# Patient Record
Sex: FEMALE | Race: WHITE | Employment: OTHER | URBAN - NONMETROPOLITAN AREA
[De-identification: names, ages, dates, MRNs, and addresses within clinical notes are randomized per-mention and may not be internally consistent; named-entity substitution may affect disease eponyms.]

---

## 2017-01-21 ENCOUNTER — APPOINTMENT (OUTPATIENT)
Dept: GENERAL RADIOLOGY | Age: 67
End: 2017-01-21
Payer: MEDICARE

## 2017-01-21 ENCOUNTER — HOSPITAL ENCOUNTER (EMERGENCY)
Age: 67
Discharge: HOME | End: 2017-01-21
Attending: EMERGENCY MEDICINE
Payer: MEDICARE

## 2017-01-21 VITALS
HEIGHT: 67 IN | DIASTOLIC BLOOD PRESSURE: 60 MMHG | TEMPERATURE: 98 F | RESPIRATION RATE: 22 BRPM | WEIGHT: 293 LBS | OXYGEN SATURATION: 100 % | SYSTOLIC BLOOD PRESSURE: 130 MMHG | HEART RATE: 83 BPM | BODY MASS INDEX: 45.99 KG/M2

## 2017-01-21 DIAGNOSIS — M79.672 LEFT FOOT PAIN: ICD-10-CM

## 2017-01-21 DIAGNOSIS — M25.562 ACUTE PAIN OF BOTH KNEES: ICD-10-CM
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
## 2017-01-21 DIAGNOSIS — M25.561 ACUTE PAIN OF BOTH KNEES: ICD-10-CM

## 2017-01-21 LAB
GLUCOSE BLD-MCNC: 81 MG/DL (ref 70–99)
PERFORMED ON: NORMAL

## 2017-01-21 PROCEDURE — 73590 X-RAY EXAM OF LOWER LEG: CPT

## 2017-01-21 PROCEDURE — 82948 REAGENT STRIP/BLOOD GLUCOSE: CPT

## 2017-01-21 PROCEDURE — 99283 EMERGENCY DEPT VISIT LOW MDM: CPT

## 2017-01-21 PROCEDURE — 99283 EMERGENCY DEPT VISIT LOW MDM: CPT | Performed by: EMERGENCY MEDICINE

## 2017-01-21 PROCEDURE — 73560 X-RAY EXAM OF KNEE 1 OR 2: CPT

## 2017-01-21 PROCEDURE — 6370000000 HC RX 637 (ALT 250 FOR IP): Performed by: EMERGENCY MEDICINE

## 2017-01-21 PROCEDURE — 73620 X-RAY EXAM OF FOOT: CPT

## 2017-01-21 RX ORDER — HYDROCODONE BITARTRATE AND ACETAMINOPHEN 5; 325 MG/1; MG/1
1 TABLET ORAL ONCE
Status: COMPLETED | OUTPATIENT
Start: 2017-01-21 | End: 2017-01-21

## 2017-01-21 RX ORDER — CYCLOBENZAPRINE HCL 10 MG
10 TABLET ORAL 3 TIMES DAILY PRN
Qty: 12 TABLET | Refills: 0 | Status: SHIPPED | OUTPATIENT
Start: 2017-01-21 | End: 2017-01-31

## 2017-01-21 RX ORDER — NAPROXEN 500 MG/1
500 TABLET ORAL 2 TIMES DAILY
Qty: 15 TABLET | Refills: 0 | Status: SHIPPED | OUTPATIENT
Start: 2017-01-21 | End: 2017-05-29

## 2017-01-21 RX ADMIN — HYDROCODONE BITARTRATE AND ACETAMINOPHEN 1 TABLET: 5; 325 TABLET ORAL at 08:24

## 2017-01-21 ASSESSMENT — ENCOUNTER SYMPTOMS
SORE THROAT: 0
RHINORRHEA: 0
DIARRHEA: 0
BACK PAIN: 0
VOMITING: 0
ABDOMINAL PAIN: 0
SHORTNESS OF BREATH: 0
NAUSEA: 0

## 2017-01-21 ASSESSMENT — PAIN SCALES - GENERAL
PAINLEVEL_OUTOF10: 6
PAINLEVEL_OUTOF10: 3

## 2017-01-21 ASSESSMENT — PAIN DESCRIPTION - PAIN TYPE: TYPE: ACUTE PAIN

## 2017-01-21 ASSESSMENT — PAIN DESCRIPTION - DESCRIPTORS: DESCRIPTORS: ACHING

## 2017-01-26 RX ORDER — SYRINGE AND NEEDLE,INSULIN,1ML 29 G X1/2"
SYRINGE, EMPTY DISPOSABLE MISCELLANEOUS
Qty: 60 EACH | Refills: 5 | Status: SHIPPED | OUTPATIENT
Start: 2017-01-26

## 2017-02-09 ENCOUNTER — DOCUMENTATION (OUTPATIENT)
Dept: PHYSICAL THERAPY | Facility: HOSPITAL | Age: 67
End: 2017-02-09

## 2017-02-09 DIAGNOSIS — I89.0 ACQUIRED LYMPHEDEMA OF LEG: Primary | ICD-10-CM

## 2017-02-09 NOTE — THERAPY DISCHARGE NOTE
Outpatient Physical Therapy Discharge Summary         Patient Name: Ashley Baker  : 1950  MRN: [id number]    Today's Date: 2017    Visit Dx:    ICD-10-CM ICD-9-CM   1. Acquired lymphedema of leg I89.0 457.1             PT OP Goals       17 1125          PT Short Term Goals    STG 1 Patient demonstrate decreased net edema volume of lower extremity >/= 10% for decrease in edema, symptoms, decreased risk of infection and improved skin care/transition to self-care of condition.   -AL      STG 1 Progress Not Met  -AL      STG 1 Progress Comments Her leg volume would fluctuate depending on activity, if she did self MLD, and if she wore comression  -AL      STG 2 Patient independent and compliant with initial home exercise program focused on diaphragmatic breathing, range of motion, flexibility to decrease edema and improve lymphatic flow for decreased edema and decreased risk of infection.    not using the boot piece for her pump. Not walking much  -AL      STG 2 Progress Not Met  -AL      STG 2 Progress Comments She is not able to do more exercises than ankle pumps  -AL      STG 3 Patient will be able to get in and out of car more easily without dragging leg.  -AL      STG 3 Progress Not Met  -AL      STG 3 Progress Comments She was not able to continue coming for treatment due to not being able to get into her car.  -AL      Long Term Goals    LTG 1 Patient will be able to independently walk and move in order to safely perform daily transfers and activities.   -AL      LTG 1 Progress Not Met  -AL      LTG 1 Progress Comments She was using a rolling chair to get around in her home  -AL      LTG 2 Patient independent and compliant with initial home exercise program focused on diaphragmatic breathing, range of motion, flexibility to decrease edema and improve lymphatic flow for decreased edema and decreased risk of infection.    still does deep breathing but other mobility is declining.  -AL       LTG 2 Progress Not Met  -AL      LTG 2 Progress Comments She was not able to do many of her exercises.  -AL      LTG 3 She will report less difficulty getting out of her car due to decrease in size of right leg.   -AL      LTG 3 Progress Not Met  -AL      LTG 3 Progress Comments She was not able to continue coming for treatment due to not being able to get into her car.  -AL        User Key  (r) = Recorded By, (t) = Taken By, (c) = Cosigned By    Initials Name Provider Type    SHALINI Landry PTA Physical Therapy Assistant          OP PT Discharge Summary  Date of Discharge: 02/09/17  Reason for Discharge: Maximum functional potential achieved, Unable to participate, Lack of progress  Outcomes Achieved: Unable to make functional progress toward goals at this time  Discharge Destination: Home with home program  Discharge Instructions: Patient was not able to walk around the house much and used mostly a rolling chair to get around her home.  She could only do a few of the exercises for her HEP, and had difficulty keeping the size of her leg down in a consistent manner.  She was not able to get into her car anymore, so she was not able to make it for appointments.  We will d/c this patient.       Time Calculation:                    Ani Landry PTA  2/9/2017

## 2017-02-24 RX ORDER — LOSARTAN POTASSIUM 100 MG/1
TABLET ORAL
Qty: 30 TABLET | Refills: 3 | Status: SHIPPED | OUTPATIENT
Start: 2017-02-24

## 2017-03-16 RX ORDER — FUROSEMIDE 40 MG/1
TABLET ORAL
Qty: 90 TABLET | Refills: 0 | Status: ON HOLD | OUTPATIENT
Start: 2017-03-16 | End: 2020-03-27 | Stop reason: SDUPTHER

## 2017-03-21 RX ORDER — ATORVASTATIN CALCIUM 10 MG/1
TABLET, FILM COATED ORAL
Qty: 30 TABLET | Refills: 5 | Status: SHIPPED | OUTPATIENT
Start: 2017-03-21

## 2017-04-04 RX ORDER — DILTIAZEM HYDROCHLORIDE 180 MG/1
CAPSULE, COATED, EXTENDED RELEASE ORAL
Qty: 60 CAPSULE | Refills: 0 | Status: SHIPPED | OUTPATIENT
Start: 2017-04-04 | End: 2017-05-04 | Stop reason: SDUPTHER

## 2017-05-24 RX ORDER — BLOOD SUGAR DIAGNOSTIC
STRIP MISCELLANEOUS
Qty: 50 STRIP | Refills: 3 | Status: SHIPPED | OUTPATIENT
Start: 2017-05-24

## 2017-05-29 ENCOUNTER — HOSPITAL ENCOUNTER (EMERGENCY)
Age: 67
Discharge: HOME | End: 2017-05-30
Payer: MEDICARE
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
## 2017-05-29 DIAGNOSIS — S80.12XA CONTUSION OF LEFT KNEE AND LOWER LEG, INITIAL ENCOUNTER: Primary | ICD-10-CM

## 2017-05-29 DIAGNOSIS — S80.02XA CONTUSION OF LEFT KNEE AND LOWER LEG, INITIAL ENCOUNTER: Primary | ICD-10-CM

## 2017-05-29 PROCEDURE — 99283 EMERGENCY DEPT VISIT LOW MDM: CPT

## 2017-05-29 PROCEDURE — 96372 THER/PROPH/DIAG INJ SC/IM: CPT

## 2017-05-29 ASSESSMENT — PAIN SCALES - GENERAL: PAINLEVEL_OUTOF10: 4

## 2017-05-29 ASSESSMENT — PAIN DESCRIPTION - LOCATION: LOCATION: FOOT;KNEE

## 2017-05-29 ASSESSMENT — PAIN DESCRIPTION - ORIENTATION: ORIENTATION: LEFT

## 2017-05-30 ENCOUNTER — APPOINTMENT (OUTPATIENT)
Dept: GENERAL RADIOLOGY | Age: 67
End: 2017-05-30
Payer: MEDICARE

## 2017-05-30 VITALS
WEIGHT: 293 LBS | TEMPERATURE: 98.7 F | RESPIRATION RATE: 17 BRPM | OXYGEN SATURATION: 94 % | DIASTOLIC BLOOD PRESSURE: 61 MMHG | HEIGHT: 67 IN | BODY MASS INDEX: 45.99 KG/M2 | SYSTOLIC BLOOD PRESSURE: 110 MMHG | HEART RATE: 82 BPM

## 2017-05-30 LAB
GLUCOSE BLD-MCNC: 129 MG/DL
GLUCOSE BLD-MCNC: 129 MG/DL (ref 70–99)
PERFORMED ON: ABNORMAL

## 2017-05-30 PROCEDURE — 73620 X-RAY EXAM OF FOOT: CPT

## 2017-05-30 PROCEDURE — 6360000002 HC RX W HCPCS: Performed by: NURSE PRACTITIONER

## 2017-05-30 PROCEDURE — 96372 THER/PROPH/DIAG INJ SC/IM: CPT

## 2017-05-30 PROCEDURE — 73630 X-RAY EXAM OF FOOT: CPT

## 2017-05-30 PROCEDURE — 73610 X-RAY EXAM OF ANKLE: CPT

## 2017-05-30 PROCEDURE — 99282 EMERGENCY DEPT VISIT SF MDM: CPT | Performed by: NURSE PRACTITIONER

## 2017-05-30 PROCEDURE — 6370000000 HC RX 637 (ALT 250 FOR IP): Performed by: NURSE PRACTITIONER

## 2017-05-30 PROCEDURE — 73600 X-RAY EXAM OF ANKLE: CPT

## 2017-05-30 PROCEDURE — 73560 X-RAY EXAM OF KNEE 1 OR 2: CPT

## 2017-05-30 PROCEDURE — 82948 REAGENT STRIP/BLOOD GLUCOSE: CPT

## 2017-05-30 PROCEDURE — 73590 X-RAY EXAM OF LOWER LEG: CPT

## 2017-05-30 RX ORDER — HYDROCODONE BITARTRATE AND ACETAMINOPHEN 7.5; 325 MG/1; MG/1
1 TABLET ORAL ONCE
Status: COMPLETED | OUTPATIENT
Start: 2017-05-30 | End: 2017-05-30

## 2017-05-30 RX ORDER — HYDROCODONE BITARTRATE AND ACETAMINOPHEN 5; 325 MG/1; MG/1
1 TABLET ORAL EVERY 6 HOURS PRN
Qty: 15 TABLET | Refills: 0 | Status: ON HOLD | OUTPATIENT
Start: 2017-05-30 | End: 2017-06-02

## 2017-05-30 RX ORDER — DEXAMETHASONE SODIUM PHOSPHATE 10 MG/ML
4 INJECTION INTRAMUSCULAR; INTRAVENOUS ONCE
Status: COMPLETED | OUTPATIENT
Start: 2017-05-30 | End: 2017-05-30

## 2017-05-30 RX ORDER — ACETAMINOPHEN 500 MG
500 TABLET ORAL EVERY MORNING
COMMUNITY

## 2017-05-30 RX ORDER — ONDANSETRON 4 MG/1
4 TABLET, ORALLY DISINTEGRATING ORAL ONCE
Status: COMPLETED | OUTPATIENT
Start: 2017-05-30 | End: 2017-05-30

## 2017-05-30 RX ORDER — ONDANSETRON 2 MG/ML
INJECTION INTRAMUSCULAR; INTRAVENOUS
Status: DISCONTINUED
Start: 2017-05-30 | End: 2017-05-30 | Stop reason: WASHOUT

## 2017-05-30 RX ORDER — DOCUSATE SODIUM 100 MG/1
100 CAPSULE, LIQUID FILLED ORAL 2 TIMES DAILY
Qty: 20 CAPSULE | Refills: 0 | Status: SHIPPED | OUTPATIENT
Start: 2017-05-30

## 2017-05-30 RX ADMIN — ONDANSETRON 4 MG: 4 TABLET, ORALLY DISINTEGRATING ORAL at 02:12

## 2017-05-30 RX ADMIN — HYDROCODONE BITARTRATE AND ACETAMINOPHEN 1 TABLET: 7.5; 325 TABLET ORAL at 00:32

## 2017-05-30 RX ADMIN — DEXAMETHASONE SODIUM PHOSPHATE 4 MG: 10 INJECTION INTRAMUSCULAR; INTRAVENOUS at 02:13

## 2017-05-30 ASSESSMENT — ENCOUNTER SYMPTOMS: RESPIRATORY NEGATIVE: 1

## 2017-05-30 ASSESSMENT — PAIN SCALES - GENERAL: PAINLEVEL_OUTOF10: 4

## 2017-05-31 ENCOUNTER — APPOINTMENT (OUTPATIENT)
Dept: GENERAL RADIOLOGY | Age: 67
End: 2017-05-31
Payer: MEDICARE

## 2017-05-31 ENCOUNTER — HOSPITAL ENCOUNTER (OUTPATIENT)
Age: 67
Setting detail: OBSERVATION
Discharge: HOME | End: 2017-06-02
Attending: HOSPITALIST | Admitting: HOSPITALIST
Payer: MEDICARE

## 2017-05-31 DIAGNOSIS — R26.2 AMBULATORY DYSFUNCTION: Primary | ICD-10-CM

## 2017-05-31 PROBLEM — S92.252A CLOSED DISPLACED FRACTURE OF NAVICULAR BONE OF LEFT FOOT: Status: ACTIVE | Noted: 2017-05-31

## 2017-05-31 PROBLEM — E11.618 TYPE 2 DIABETES MELLITUS WITH DIABETIC ARTHROPATHY, WITH LONG-TERM CURRENT USE OF INSULIN (HCC): Status: ACTIVE | Noted: 2017-05-31

## 2017-05-31 PROBLEM — D72.829 LEUKOCYTOSIS: Status: ACTIVE | Noted: 2017-05-31

## 2017-05-31 PROBLEM — Z79.4 TYPE 2 DIABETES MELLITUS WITH DIABETIC ARTHROPATHY, WITH LONG-TERM CURRENT USE OF INSULIN (HCC): Status: ACTIVE | Noted: 2017-05-31

## 2017-05-31 PROBLEM — R62.7 FAILURE TO THRIVE IN ADULT: Status: ACTIVE | Noted: 2017-05-31

## 2017-05-31 PROBLEM — N30.01 ACUTE CYSTITIS WITH HEMATURIA: Status: ACTIVE | Noted: 2017-05-31

## 2017-05-31 LAB
ALBUMIN SERPL-MCNC: 3.7 G/DL (ref 3.5–5.2)
ALP BLD-CCNC: 78 U/L (ref 35–104)
ALT SERPL-CCNC: 16 U/L (ref 5–33)
ANION GAP SERPL CALCULATED.3IONS-SCNC: 12 MMOL/L (ref 7–19)
AST SERPL-CCNC: 20 U/L (ref 5–32)
BACTERIA: NEGATIVE /HPF
BILIRUB SERPL-MCNC: 0.4 MG/DL (ref 0.2–1.2)
BILIRUBIN URINE: NEGATIVE
BLOOD, URINE: ABNORMAL
BUN BLDV-MCNC: 33 MG/DL (ref 8–23)
CALCIUM SERPL-MCNC: 9.6 MG/DL (ref 8.8–10.2)
CHLORIDE BLD-SCNC: 101 MMOL/L (ref 98–111)
CLARITY: ABNORMAL
CO2: 28 MMOL/L (ref 22–29)
COLOR: YELLOW
CREAT SERPL-MCNC: 1.1 MG/DL (ref 0.5–0.9)
EPITHELIAL CELLS, UA: 1 /HPF (ref 0–5)
GFR NON-AFRICAN AMERICAN: 49
GLUCOSE BLD-MCNC: 160 MG/DL (ref 74–109)
GLUCOSE BLD-MCNC: 213 MG/DL (ref 70–99)
GLUCOSE URINE: NEGATIVE MG/DL
HBA1C MFR BLD: 5.8 %
HCT VFR BLD CALC: 36.2 % (ref 37–47)
HEMOGLOBIN: 11.1 G/DL (ref 12–16)
HYALINE CASTS: 4 /HPF (ref 0–8)
KETONES, URINE: NEGATIVE MG/DL
LEUKOCYTE ESTERASE, URINE: ABNORMAL
MCH RBC QN AUTO: 25.8 PG (ref 27–31)
MCHC RBC AUTO-ENTMCNC: 30.7 G/DL (ref 33–37)
MCV RBC AUTO: 84.2 FL (ref 81–99)
NITRITE, URINE: POSITIVE
PDW BLD-RTO: 17.9 % (ref 11.5–14.5)
PERFORMED ON: ABNORMAL
PH UA: 5.5
PLATELET # BLD: 240 K/UL (ref 130–400)
PMV BLD AUTO: 11.2 FL (ref 7.4–10.4)
POTASSIUM SERPL-SCNC: 4.3 MMOL/L (ref 3.5–5)
PRO-BNP: 5779 PG/ML (ref 0–900)
PROTEIN UA: 100 MG/DL
RBC # BLD: 4.3 M/UL (ref 4.2–5.4)
RBC UA: 5 /HPF (ref 0–4)
SODIUM BLD-SCNC: 141 MMOL/L (ref 136–145)
SPECIFIC GRAVITY UA: 1.02
T4 TOTAL: 7.4 UG/ML (ref 4.5–11.7)
TOTAL PROTEIN: 7.1 G/DL (ref 6.6–8.7)
TSH SERPL DL<=0.05 MIU/L-ACNC: 2.3 UIU/ML (ref 0.27–4.2)
UROBILINOGEN, URINE: 0.2 E.U./DL
WBC # BLD: 19.5 K/UL (ref 4.8–10.8)
WBC UA: 280 /HPF (ref 0–5)

## 2017-05-31 PROCEDURE — 6370000000 HC RX 637 (ALT 250 FOR IP): Performed by: FAMILY MEDICINE

## 2017-05-31 PROCEDURE — 87086 URINE CULTURE/COLONY COUNT: CPT

## 2017-05-31 PROCEDURE — 84443 ASSAY THYROID STIM HORMONE: CPT

## 2017-05-31 PROCEDURE — 71010 XR CHEST PORTABLE: CPT

## 2017-05-31 PROCEDURE — 87185 SC STD ENZYME DETCJ PER NZM: CPT

## 2017-05-31 PROCEDURE — 82948 REAGENT STRIP/BLOOD GLUCOSE: CPT

## 2017-05-31 PROCEDURE — 36415 COLL VENOUS BLD VENIPUNCTURE: CPT

## 2017-05-31 PROCEDURE — 87040 BLOOD CULTURE FOR BACTERIA: CPT

## 2017-05-31 PROCEDURE — 85027 COMPLETE CBC AUTOMATED: CPT

## 2017-05-31 PROCEDURE — 83880 ASSAY OF NATRIURETIC PEPTIDE: CPT

## 2017-05-31 PROCEDURE — 99284 EMERGENCY DEPT VISIT MOD MDM: CPT | Performed by: EMERGENCY MEDICINE

## 2017-05-31 PROCEDURE — 99285 EMERGENCY DEPT VISIT HI MDM: CPT

## 2017-05-31 PROCEDURE — 84436 ASSAY OF TOTAL THYROXINE: CPT

## 2017-05-31 PROCEDURE — 2580000003 HC RX 258: Performed by: FAMILY MEDICINE

## 2017-05-31 PROCEDURE — G0378 HOSPITAL OBSERVATION PER HR: HCPCS

## 2017-05-31 PROCEDURE — 81001 URINALYSIS AUTO W/SCOPE: CPT

## 2017-05-31 PROCEDURE — 80053 COMPREHEN METABOLIC PANEL: CPT

## 2017-05-31 PROCEDURE — 83036 HEMOGLOBIN GLYCOSYLATED A1C: CPT

## 2017-05-31 PROCEDURE — 99220 PR INITIAL OBSERVATION CARE/DAY 70 MINUTES: CPT | Performed by: FAMILY MEDICINE

## 2017-05-31 PROCEDURE — 2700000000 HC OXYGEN THERAPY PER DAY

## 2017-05-31 RX ORDER — ACETAMINOPHEN 325 MG/1
650 TABLET ORAL EVERY 4 HOURS PRN
Status: DISCONTINUED | OUTPATIENT
Start: 2017-05-31 | End: 2017-06-02 | Stop reason: HOSPADM

## 2017-05-31 RX ORDER — DEXTROSE MONOHYDRATE 50 MG/ML
100 INJECTION, SOLUTION INTRAVENOUS PRN
Status: DISCONTINUED | OUTPATIENT
Start: 2017-05-31 | End: 2017-06-02 | Stop reason: HOSPADM

## 2017-05-31 RX ORDER — SODIUM CHLORIDE 9 MG/ML
INJECTION, SOLUTION INTRAVENOUS CONTINUOUS
Status: DISCONTINUED | OUTPATIENT
Start: 2017-05-31 | End: 2017-06-02 | Stop reason: HOSPADM

## 2017-05-31 RX ORDER — DILTIAZEM HYDROCHLORIDE 180 MG/1
180 CAPSULE, COATED, EXTENDED RELEASE ORAL DAILY
Status: DISCONTINUED | OUTPATIENT
Start: 2017-06-01 | End: 2017-06-02 | Stop reason: HOSPADM

## 2017-05-31 RX ORDER — SODIUM CHLORIDE 0.9 % (FLUSH) 0.9 %
10 SYRINGE (ML) INJECTION EVERY 12 HOURS SCHEDULED
Status: DISCONTINUED | OUTPATIENT
Start: 2017-05-31 | End: 2017-06-02 | Stop reason: HOSPADM

## 2017-05-31 RX ORDER — HYDROCODONE BITARTRATE AND ACETAMINOPHEN 5; 325 MG/1; MG/1
2 TABLET ORAL EVERY 4 HOURS PRN
Status: DISCONTINUED | OUTPATIENT
Start: 2017-05-31 | End: 2017-06-02 | Stop reason: HOSPADM

## 2017-05-31 RX ORDER — SODIUM CHLORIDE 0.9 % (FLUSH) 0.9 %
10 SYRINGE (ML) INJECTION PRN
Status: DISCONTINUED | OUTPATIENT
Start: 2017-05-31 | End: 2017-06-02 | Stop reason: HOSPADM

## 2017-05-31 RX ORDER — ALLOPURINOL 300 MG/1
300 TABLET ORAL DAILY
Status: DISCONTINUED | OUTPATIENT
Start: 2017-06-01 | End: 2017-06-02 | Stop reason: HOSPADM

## 2017-05-31 RX ORDER — ONDANSETRON 2 MG/ML
4 INJECTION INTRAMUSCULAR; INTRAVENOUS EVERY 6 HOURS PRN
Status: DISCONTINUED | OUTPATIENT
Start: 2017-05-31 | End: 2017-06-02 | Stop reason: HOSPADM

## 2017-05-31 RX ORDER — LOSARTAN POTASSIUM 50 MG/1
50 TABLET ORAL DAILY
Status: DISCONTINUED | OUTPATIENT
Start: 2017-06-01 | End: 2017-06-02 | Stop reason: HOSPADM

## 2017-05-31 RX ORDER — HYDROCODONE BITARTRATE AND ACETAMINOPHEN 5; 325 MG/1; MG/1
1 TABLET ORAL EVERY 4 HOURS PRN
Status: DISCONTINUED | OUTPATIENT
Start: 2017-05-31 | End: 2017-06-02 | Stop reason: HOSPADM

## 2017-05-31 RX ORDER — INSULIN GLARGINE 100 [IU]/ML
20 INJECTION, SOLUTION SUBCUTANEOUS 2 TIMES DAILY
Status: DISCONTINUED | OUTPATIENT
Start: 2017-05-31 | End: 2017-06-02 | Stop reason: HOSPADM

## 2017-05-31 RX ORDER — DEXTROSE MONOHYDRATE 25 G/50ML
12.5 INJECTION, SOLUTION INTRAVENOUS PRN
Status: DISCONTINUED | OUTPATIENT
Start: 2017-05-31 | End: 2017-06-02 | Stop reason: HOSPADM

## 2017-05-31 RX ORDER — DOCUSATE SODIUM 100 MG/1
100 CAPSULE, LIQUID FILLED ORAL 2 TIMES DAILY
Status: DISCONTINUED | OUTPATIENT
Start: 2017-05-31 | End: 2017-06-02

## 2017-05-31 RX ORDER — ATORVASTATIN CALCIUM 20 MG/1
20 TABLET, FILM COATED ORAL NIGHTLY
Status: DISCONTINUED | OUTPATIENT
Start: 2017-05-31 | End: 2017-06-02 | Stop reason: HOSPADM

## 2017-05-31 RX ORDER — LEVOTHYROXINE SODIUM 0.1 MG/1
200 TABLET ORAL DAILY
Status: DISCONTINUED | OUTPATIENT
Start: 2017-06-01 | End: 2017-06-02 | Stop reason: HOSPADM

## 2017-05-31 RX ORDER — PAROXETINE HYDROCHLORIDE 20 MG/1
20 TABLET, FILM COATED ORAL DAILY
Status: DISCONTINUED | OUTPATIENT
Start: 2017-06-01 | End: 2017-06-02 | Stop reason: HOSPADM

## 2017-05-31 RX ORDER — NICOTINE POLACRILEX 4 MG
15 LOZENGE BUCCAL PRN
Status: DISCONTINUED | OUTPATIENT
Start: 2017-05-31 | End: 2017-06-02 | Stop reason: HOSPADM

## 2017-05-31 RX ADMIN — Medication 10 ML: at 23:56

## 2017-05-31 RX ADMIN — SODIUM CHLORIDE: 9 INJECTION, SOLUTION INTRAVENOUS at 23:46

## 2017-05-31 RX ADMIN — INSULIN LISPRO 2 UNITS: 100 INJECTION, SOLUTION INTRAVENOUS; SUBCUTANEOUS at 23:40

## 2017-05-31 RX ADMIN — INSULIN GLARGINE 20 UNITS: 100 INJECTION, SOLUTION SUBCUTANEOUS at 23:40

## 2017-05-31 RX ADMIN — DOCUSATE SODIUM 100 MG: 100 CAPSULE, LIQUID FILLED ORAL at 23:39

## 2017-05-31 RX ADMIN — ATORVASTATIN CALCIUM 20 MG: 20 TABLET, FILM COATED ORAL at 23:39

## 2017-05-31 ASSESSMENT — ENCOUNTER SYMPTOMS
EYE ITCHING: 0
COUGH: 0
GASTROINTESTINAL NEGATIVE: 1
ABDOMINAL PAIN: 0
APNEA: 0
CHOKING: 0
EYE PAIN: 0
BACK PAIN: 0
EYES NEGATIVE: 1
ABDOMINAL DISTENTION: 0
EYE DISCHARGE: 0
RESPIRATORY NEGATIVE: 1

## 2017-05-31 ASSESSMENT — PAIN DESCRIPTION - LOCATION: LOCATION: LEG

## 2017-05-31 ASSESSMENT — PAIN DESCRIPTION - ORIENTATION: ORIENTATION: LEFT;RIGHT

## 2017-05-31 ASSESSMENT — PAIN SCALES - GENERAL
PAINLEVEL_OUTOF10: 6
PAINLEVEL_OUTOF10: 6

## 2017-05-31 ASSESSMENT — PAIN DESCRIPTION - PAIN TYPE: TYPE: ACUTE PAIN

## 2017-06-01 PROBLEM — D64.9 ANEMIA OF UNKNOWN ETIOLOGY: Status: ACTIVE | Noted: 2017-06-01

## 2017-06-01 PROBLEM — I50.9 CHF (CONGESTIVE HEART FAILURE) (HCC): Status: ACTIVE | Noted: 2017-06-01

## 2017-06-01 PROBLEM — N18.30 CKD (CHRONIC KIDNEY DISEASE) STAGE 3, GFR 30-59 ML/MIN (HCC): Status: ACTIVE | Noted: 2017-06-01

## 2017-06-01 LAB
ANION GAP SERPL CALCULATED.3IONS-SCNC: 11 MMOL/L (ref 7–19)
BASOPHILS ABSOLUTE: 0 K/UL (ref 0–0.2)
BASOPHILS RELATIVE PERCENT: 0.3 % (ref 0–1)
BUN BLDV-MCNC: 36 MG/DL (ref 8–23)
CALCIUM SERPL-MCNC: 9 MG/DL (ref 8.8–10.2)
CHLORIDE BLD-SCNC: 101 MMOL/L (ref 98–111)
CO2: 28 MMOL/L (ref 22–29)
CREAT SERPL-MCNC: 1.1 MG/DL (ref 0.5–0.9)
EKG P AXIS: 63 DEGREES
EKG P-R INTERVAL: 186 MS
EKG Q-T INTERVAL: 372 MS
EKG QRS DURATION: 86 MS
EKG QTC CALCULATION (BAZETT): 401 MS
EKG T AXIS: 41 DEGREES
EOSINOPHILS ABSOLUTE: 0.8 K/UL (ref 0–0.6)
EOSINOPHILS RELATIVE PERCENT: 6 % (ref 0–5)
GFR NON-AFRICAN AMERICAN: 49
GLUCOSE BLD-MCNC: 102 MG/DL (ref 70–99)
GLUCOSE BLD-MCNC: 135 MG/DL (ref 70–99)
GLUCOSE BLD-MCNC: 157 MG/DL (ref 74–109)
GLUCOSE BLD-MCNC: 169 MG/DL (ref 70–99)
GLUCOSE BLD-MCNC: 185 MG/DL (ref 70–99)
HCT VFR BLD CALC: 32 % (ref 37–47)
HEMOGLOBIN: 9.7 G/DL (ref 12–16)
LV EF: 58 %
LVEF MODALITY: NORMAL
LYMPHOCYTES ABSOLUTE: 1.5 K/UL (ref 1.1–4.5)
LYMPHOCYTES RELATIVE PERCENT: 11.7 % (ref 20–40)
MCH RBC QN AUTO: 25.7 PG (ref 27–31)
MCHC RBC AUTO-ENTMCNC: 30.3 G/DL (ref 33–37)
MCV RBC AUTO: 84.9 FL (ref 81–99)
MONOCYTES ABSOLUTE: 1.5 K/UL (ref 0–0.9)
MONOCYTES RELATIVE PERCENT: 11.3 % (ref 0–10)
NEUTROPHILS ABSOLUTE: 9.2 K/UL (ref 1.5–7.5)
NEUTROPHILS RELATIVE PERCENT: 70 % (ref 50–65)
PDW BLD-RTO: 17.7 % (ref 11.5–14.5)
PERFORMED ON: ABNORMAL
PLATELET # BLD: 216 K/UL (ref 130–400)
PMV BLD AUTO: 11.5 FL (ref 7.4–10.4)
POTASSIUM SERPL-SCNC: 4.3 MMOL/L (ref 3.5–5)
RBC # BLD: 3.77 M/UL (ref 4.2–5.4)
SODIUM BLD-SCNC: 140 MMOL/L (ref 136–145)
WBC # BLD: 13.1 K/UL (ref 4.8–10.8)

## 2017-06-01 PROCEDURE — 93306 TTE W/DOPPLER COMPLETE: CPT

## 2017-06-01 PROCEDURE — 80048 BASIC METABOLIC PNL TOTAL CA: CPT

## 2017-06-01 PROCEDURE — 6360000002 HC RX W HCPCS: Performed by: FAMILY MEDICINE

## 2017-06-01 PROCEDURE — G8978 MOBILITY CURRENT STATUS: HCPCS

## 2017-06-01 PROCEDURE — G8979 MOBILITY GOAL STATUS: HCPCS

## 2017-06-01 PROCEDURE — 2580000003 HC RX 258: Performed by: NURSE PRACTITIONER

## 2017-06-01 PROCEDURE — 2700000000 HC OXYGEN THERAPY PER DAY

## 2017-06-01 PROCEDURE — 6370000000 HC RX 637 (ALT 250 FOR IP): Performed by: NURSE PRACTITIONER

## 2017-06-01 PROCEDURE — 93005 ELECTROCARDIOGRAM TRACING: CPT

## 2017-06-01 PROCEDURE — G0378 HOSPITAL OBSERVATION PER HR: HCPCS

## 2017-06-01 PROCEDURE — 99225 PR SBSQ OBSERVATION CARE/DAY 25 MINUTES: CPT | Performed by: INTERNAL MEDICINE

## 2017-06-01 PROCEDURE — 6370000000 HC RX 637 (ALT 250 FOR IP): Performed by: FAMILY MEDICINE

## 2017-06-01 PROCEDURE — 6360000002 HC RX W HCPCS: Performed by: NURSE PRACTITIONER

## 2017-06-01 PROCEDURE — 36415 COLL VENOUS BLD VENIPUNCTURE: CPT

## 2017-06-01 PROCEDURE — 2580000003 HC RX 258: Performed by: FAMILY MEDICINE

## 2017-06-01 PROCEDURE — 97161 PT EVAL LOW COMPLEX 20 MIN: CPT

## 2017-06-01 PROCEDURE — 82948 REAGENT STRIP/BLOOD GLUCOSE: CPT

## 2017-06-01 PROCEDURE — 85025 COMPLETE CBC W/AUTO DIFF WBC: CPT

## 2017-06-01 RX ORDER — SKIN CLEANSER COMB NO.42
CLEANSER (ML) TOPICAL DAILY
Status: DISCONTINUED | OUTPATIENT
Start: 2017-06-01 | End: 2017-06-02 | Stop reason: HOSPADM

## 2017-06-01 RX ORDER — LANOLIN ALCOHOL/MO/W.PET/CERES
CREAM (GRAM) TOPICAL 2 TIMES DAILY
Status: DISCONTINUED | OUTPATIENT
Start: 2017-06-01 | End: 2017-06-02 | Stop reason: HOSPADM

## 2017-06-01 RX ADMIN — INSULIN GLARGINE 20 UNITS: 100 INJECTION, SOLUTION SUBCUTANEOUS at 22:07

## 2017-06-01 RX ADMIN — INSULIN LISPRO 2 UNITS: 100 INJECTION, SOLUTION INTRAVENOUS; SUBCUTANEOUS at 17:53

## 2017-06-01 RX ADMIN — HYDROCODONE BITARTRATE AND ACETAMINOPHEN 2 TABLET: 5; 325 TABLET ORAL at 04:08

## 2017-06-01 RX ADMIN — ANORECTAL OINTMENT: 15.7; .44; 24; 20.6 OINTMENT TOPICAL at 14:55

## 2017-06-01 RX ADMIN — DILTIAZEM HYDROCHLORIDE 180 MG: 180 CAPSULE, COATED, EXTENDED RELEASE ORAL at 08:11

## 2017-06-01 RX ADMIN — CEFTRIAXONE SODIUM 1 G: 1 INJECTION, POWDER, FOR SOLUTION INTRAMUSCULAR; INTRAVENOUS at 11:28

## 2017-06-01 RX ADMIN — DOCUSATE SODIUM 100 MG: 100 CAPSULE, LIQUID FILLED ORAL at 22:05

## 2017-06-01 RX ADMIN — SODIUM CHLORIDE: 9 INJECTION, SOLUTION INTRAVENOUS at 15:02

## 2017-06-01 RX ADMIN — ATORVASTATIN CALCIUM 20 MG: 20 TABLET, FILM COATED ORAL at 22:06

## 2017-06-01 RX ADMIN — INSULIN GLARGINE 20 UNITS: 100 INJECTION, SOLUTION SUBCUTANEOUS at 08:15

## 2017-06-01 RX ADMIN — Medication: at 14:55

## 2017-06-01 RX ADMIN — Medication: at 14:54

## 2017-06-01 RX ADMIN — ALLOPURINOL 300 MG: 300 TABLET ORAL at 08:15

## 2017-06-01 RX ADMIN — DOCUSATE SODIUM 100 MG: 100 CAPSULE, LIQUID FILLED ORAL at 08:11

## 2017-06-01 RX ADMIN — LEVOTHYROXINE SODIUM 200 MCG: 100 TABLET ORAL at 06:19

## 2017-06-01 RX ADMIN — ENOXAPARIN SODIUM 40 MG: 40 INJECTION SUBCUTANEOUS at 08:11

## 2017-06-01 ASSESSMENT — ENCOUNTER SYMPTOMS
SHORTNESS OF BREATH: 0
WHEEZING: 0
BLURRED VISION: 0
ABDOMINAL PAIN: 0
SPUTUM PRODUCTION: 0
NAUSEA: 0
COUGH: 0
DIARRHEA: 0
VOMITING: 0
CONSTIPATION: 1
SORE THROAT: 0

## 2017-06-01 ASSESSMENT — PAIN SCALES - GENERAL: PAINLEVEL_OUTOF10: 0

## 2017-06-02 VITALS
HEART RATE: 97 BPM | TEMPERATURE: 98.5 F | OXYGEN SATURATION: 91 % | HEIGHT: 67 IN | RESPIRATION RATE: 22 BRPM | DIASTOLIC BLOOD PRESSURE: 63 MMHG | SYSTOLIC BLOOD PRESSURE: 118 MMHG

## 2017-06-02 LAB
FERRITIN: 130.6 NG/ML (ref 13–150)
FOLATE: 9.7 NG/ML (ref 4.8–37.3)
GLUCOSE BLD-MCNC: 166 MG/DL (ref 70–99)
GLUCOSE BLD-MCNC: 172 MG/DL (ref 70–99)
GLUCOSE BLD-MCNC: 175 MG/DL (ref 70–99)
HCT VFR BLD CALC: 32.1 % (ref 37–47)
IRON SATURATION: 5 % (ref 14–50)
IRON: 13 UG/DL (ref 37–145)
ORGANISM: ABNORMAL
PERFORMED ON: ABNORMAL
RETICULOCYTE ABSOLUTE COUNT: 0.05 M/UL (ref 0.03–0.12)
RETICULOCYTE COUNT PCT: 1.29 % (ref 0.5–1.5)
TOTAL IRON BINDING CAPACITY: 242 UG/DL (ref 250–400)
URINE CULTURE, ROUTINE: ABNORMAL
URINE CULTURE, ROUTINE: ABNORMAL
VITAMIN B-12: 210 PG/ML (ref 211–946)

## 2017-06-02 PROCEDURE — 6370000000 HC RX 637 (ALT 250 FOR IP): Performed by: NURSE PRACTITIONER

## 2017-06-02 PROCEDURE — 6360000002 HC RX W HCPCS: Performed by: NURSE PRACTITIONER

## 2017-06-02 PROCEDURE — 82948 REAGENT STRIP/BLOOD GLUCOSE: CPT

## 2017-06-02 PROCEDURE — 83540 ASSAY OF IRON: CPT

## 2017-06-02 PROCEDURE — 85045 AUTOMATED RETICULOCYTE COUNT: CPT

## 2017-06-02 PROCEDURE — 6360000002 HC RX W HCPCS: Performed by: FAMILY MEDICINE

## 2017-06-02 PROCEDURE — 82728 ASSAY OF FERRITIN: CPT

## 2017-06-02 PROCEDURE — 36415 COLL VENOUS BLD VENIPUNCTURE: CPT

## 2017-06-02 PROCEDURE — 2700000000 HC OXYGEN THERAPY PER DAY

## 2017-06-02 PROCEDURE — G0378 HOSPITAL OBSERVATION PER HR: HCPCS

## 2017-06-02 PROCEDURE — 2580000003 HC RX 258: Performed by: NURSE PRACTITIONER

## 2017-06-02 PROCEDURE — 82746 ASSAY OF FOLIC ACID SERUM: CPT

## 2017-06-02 PROCEDURE — 99217 PR OBSERVATION CARE DISCHARGE MANAGEMENT: CPT | Performed by: NURSE PRACTITIONER

## 2017-06-02 PROCEDURE — 82607 VITAMIN B-12: CPT

## 2017-06-02 PROCEDURE — 6370000000 HC RX 637 (ALT 250 FOR IP): Performed by: FAMILY MEDICINE

## 2017-06-02 PROCEDURE — 83550 IRON BINDING TEST: CPT

## 2017-06-02 RX ORDER — CYANOCOBALAMIN 1000 UG/ML
1000 INJECTION INTRAMUSCULAR; SUBCUTANEOUS
Status: DISCONTINUED | OUTPATIENT
Start: 2017-06-02 | End: 2017-06-02 | Stop reason: HOSPADM

## 2017-06-02 RX ORDER — CYANOCOBALAMIN 1000 UG/ML
1000 INJECTION INTRAMUSCULAR; SUBCUTANEOUS
Status: ON HOLD | DISCHARGE
Start: 2017-06-02 | End: 2019-06-25

## 2017-06-02 RX ORDER — SKIN CLEANSER COMB NO.42
CLEANSER (ML) TOPICAL
Status: ON HOLD | DISCHARGE
Start: 2017-06-02 | End: 2019-06-25

## 2017-06-02 RX ORDER — BISACODYL 10 MG
10 SUPPOSITORY, RECTAL RECTAL DAILY
Status: DISCONTINUED | OUTPATIENT
Start: 2017-06-02 | End: 2017-06-02 | Stop reason: HOSPADM

## 2017-06-02 RX ORDER — LANOLIN ALCOHOL/MO/W.PET/CERES
CREAM (GRAM) TOPICAL 2 TIMES DAILY
Refills: 0 | DISCHARGE
Start: 2017-06-02

## 2017-06-02 RX ORDER — POLYETHYLENE GLYCOL 3350 17 G/17G
17 POWDER, FOR SOLUTION ORAL 2 TIMES DAILY
Qty: 527 G | Refills: 1 | DISCHARGE
Start: 2017-06-02 | End: 2017-07-02

## 2017-06-02 RX ORDER — SODIUM PHOSPHATE, DIBASIC AND SODIUM PHOSPHATE, MONOBASIC 7; 19 G/133ML; G/133ML
1 ENEMA RECTAL
Status: DISCONTINUED | OUTPATIENT
Start: 2017-06-02 | End: 2017-06-02 | Stop reason: HOSPADM

## 2017-06-02 RX ORDER — HYDROCODONE BITARTRATE AND ACETAMINOPHEN 5; 325 MG/1; MG/1
1 TABLET ORAL EVERY 6 HOURS PRN
Qty: 15 TABLET | Refills: 0 | Status: ON HOLD | OUTPATIENT
Start: 2017-06-02 | End: 2019-03-13 | Stop reason: HOSPADM

## 2017-06-02 RX ORDER — FERROUS SULFATE 325(65) MG
325 TABLET ORAL
Qty: 30 TABLET | Refills: 3 | DISCHARGE
Start: 2017-06-02

## 2017-06-02 RX ORDER — POLYETHYLENE GLYCOL 3350 17 G/17G
17 POWDER, FOR SOLUTION ORAL 2 TIMES DAILY
Status: DISCONTINUED | OUTPATIENT
Start: 2017-06-02 | End: 2017-06-02 | Stop reason: HOSPADM

## 2017-06-02 RX ORDER — CEFDINIR 300 MG/1
300 CAPSULE ORAL 2 TIMES DAILY
Qty: 20 CAPSULE | Refills: 0 | DISCHARGE
Start: 2017-06-02 | End: 2017-06-12

## 2017-06-02 RX ORDER — DOCUSATE SODIUM 100 MG/1
200 CAPSULE, LIQUID FILLED ORAL 2 TIMES DAILY
Status: DISCONTINUED | OUTPATIENT
Start: 2017-06-02 | End: 2017-06-02 | Stop reason: HOSPADM

## 2017-06-02 RX ADMIN — MAGNESIUM HYDROXIDE 30 ML: 400 SUSPENSION ORAL at 06:27

## 2017-06-02 RX ADMIN — LEVOTHYROXINE SODIUM 200 MCG: 100 TABLET ORAL at 05:51

## 2017-06-02 RX ADMIN — LOSARTAN POTASSIUM 50 MG: 50 TABLET ORAL at 08:28

## 2017-06-02 RX ADMIN — ENOXAPARIN SODIUM 40 MG: 40 INJECTION SUBCUTANEOUS at 08:28

## 2017-06-02 RX ADMIN — Medication: at 08:34

## 2017-06-02 RX ADMIN — DILTIAZEM HYDROCHLORIDE 180 MG: 180 CAPSULE, COATED, EXTENDED RELEASE ORAL at 08:28

## 2017-06-02 RX ADMIN — PAROXETINE 20 MG: 20 TABLET, FILM COATED ORAL at 08:28

## 2017-06-02 RX ADMIN — ALLOPURINOL 300 MG: 300 TABLET ORAL at 08:28

## 2017-06-02 RX ADMIN — INSULIN GLARGINE 20 UNITS: 100 INJECTION, SOLUTION SUBCUTANEOUS at 08:29

## 2017-06-02 RX ADMIN — DOCUSATE SODIUM 100 MG: 100 CAPSULE, LIQUID FILLED ORAL at 08:28

## 2017-06-02 RX ADMIN — INSULIN LISPRO 2 UNITS: 100 INJECTION, SOLUTION INTRAVENOUS; SUBCUTANEOUS at 08:30

## 2017-06-02 RX ADMIN — ANORECTAL OINTMENT: 15.7; .44; 24; 20.6 OINTMENT TOPICAL at 08:34

## 2017-06-02 RX ADMIN — POLYETHYLENE GLYCOL 3350 17 G: 17 POWDER, FOR SOLUTION ORAL at 10:31

## 2017-06-02 RX ADMIN — BISACODYL 10 MG: 10 SUPPOSITORY RECTAL at 10:31

## 2017-06-02 RX ADMIN — IRON SUCROSE 200 MG: 20 INJECTION, SOLUTION INTRAVENOUS at 13:19

## 2017-06-02 RX ADMIN — CYANOCOBALAMIN 1000 MCG: 1000 INJECTION, SOLUTION INTRAMUSCULAR at 13:19

## 2017-06-06 ENCOUNTER — TELEPHONE (OUTPATIENT)
Dept: PRIMARY CARE CLINIC | Age: 67
End: 2017-06-06

## 2017-06-06 LAB
BLOOD CULTURE, ROUTINE: NORMAL
CULTURE, BLOOD 2: NORMAL

## 2017-06-26 ASSESSMENT — ENCOUNTER SYMPTOMS
ABDOMINAL DISTENTION: 0
BACK PAIN: 0
COUGH: 0
RESPIRATORY NEGATIVE: 1
EYE PAIN: 0
ABDOMINAL PAIN: 0
APNEA: 0
EYE ITCHING: 0
CHOKING: 0
EYES NEGATIVE: 1
GASTROINTESTINAL NEGATIVE: 1
EYE DISCHARGE: 0

## 2018-03-01 ENCOUNTER — HOSPITAL ENCOUNTER (EMERGENCY)
Age: 68
Discharge: HOME | End: 2018-03-01
Attending: EMERGENCY MEDICINE
Payer: MEDICARE

## 2018-03-01 VITALS
DIASTOLIC BLOOD PRESSURE: 80 MMHG | HEART RATE: 78 BPM | OXYGEN SATURATION: 94 % | SYSTOLIC BLOOD PRESSURE: 146 MMHG | TEMPERATURE: 98 F | RESPIRATION RATE: 22 BRPM

## 2018-03-01 DIAGNOSIS — R11.2 NAUSEA AND VOMITING, INTRACTABILITY OF VOMITING NOT SPECIFIED, UNSPECIFIED VOMITING TYPE: Primary | ICD-10-CM

## 2018-03-01 LAB
ALBUMIN SERPL-MCNC: 3.2 G/DL (ref 3.5–5.2)
ALP BLD-CCNC: 73 U/L (ref 35–104)
ALT SERPL-CCNC: 9 U/L (ref 5–33)
ANION GAP SERPL CALCULATED.3IONS-SCNC: 12 MMOL/L (ref 7–19)
AST SERPL-CCNC: 13 U/L (ref 5–32)
BASOPHILS ABSOLUTE: 0 K/UL (ref 0–0.2)
BASOPHILS RELATIVE PERCENT: 0.2 % (ref 0–1)
BILIRUB SERPL-MCNC: 0.4 MG/DL (ref 0.2–1.2)
BUN BLDV-MCNC: 17 MG/DL (ref 8–23)
CALCIUM SERPL-MCNC: 9.9 MG/DL (ref 8.8–10.2)
CHLORIDE BLD-SCNC: 97 MMOL/L (ref 98–111)
CO2: 31 MMOL/L (ref 22–29)
CREAT SERPL-MCNC: 0.7 MG/DL (ref 0.5–0.9)
EOSINOPHILS ABSOLUTE: 0 K/UL (ref 0–0.6)
EOSINOPHILS RELATIVE PERCENT: 0.1 % (ref 0–5)
GFR NON-AFRICAN AMERICAN: >60
GLUCOSE BLD-MCNC: 176 MG/DL (ref 74–109)
HCT VFR BLD CALC: 41.9 % (ref 37–47)
HEMOGLOBIN: 13.4 G/DL (ref 12–16)
LACTIC ACID: 1.3 MMOL/L (ref 0.5–1.9)
LIPASE: 23 U/L (ref 13–60)
LYMPHOCYTES ABSOLUTE: 2.2 K/UL (ref 1.1–4.5)
LYMPHOCYTES RELATIVE PERCENT: 17.3 % (ref 20–40)
MAGNESIUM: 1.8 MG/DL (ref 1.6–2.4)
MCH RBC QN AUTO: 28.2 PG (ref 27–31)
MCHC RBC AUTO-ENTMCNC: 32 G/DL (ref 33–37)
MCV RBC AUTO: 88.2 FL (ref 81–99)
MONOCYTES ABSOLUTE: 0.9 K/UL (ref 0–0.9)
MONOCYTES RELATIVE PERCENT: 7.4 % (ref 0–10)
NEUTROPHILS ABSOLUTE: 9.3 K/UL (ref 1.5–7.5)
NEUTROPHILS RELATIVE PERCENT: 74 % (ref 50–65)
PDW BLD-RTO: 15 % (ref 11.5–14.5)
PLATELET # BLD: 244 K/UL (ref 130–400)
PMV BLD AUTO: 10.6 FL (ref 9.4–12.3)
POTASSIUM SERPL-SCNC: 4.1 MMOL/L (ref 3.5–5)
RBC # BLD: 4.75 M/UL (ref 4.2–5.4)
SODIUM BLD-SCNC: 140 MMOL/L (ref 136–145)
TOTAL PROTEIN: 7.1 G/DL (ref 6.6–8.7)
WBC # BLD: 12.6 K/UL (ref 4.8–10.8)

## 2018-03-01 PROCEDURE — 85025 COMPLETE CBC W/AUTO DIFF WBC: CPT

## 2018-03-01 PROCEDURE — 6360000002 HC RX W HCPCS: Performed by: EMERGENCY MEDICINE

## 2018-03-01 PROCEDURE — 99283 EMERGENCY DEPT VISIT LOW MDM: CPT | Performed by: EMERGENCY MEDICINE

## 2018-03-01 PROCEDURE — 99284 EMERGENCY DEPT VISIT MOD MDM: CPT

## 2018-03-01 PROCEDURE — 83735 ASSAY OF MAGNESIUM: CPT

## 2018-03-01 PROCEDURE — 83690 ASSAY OF LIPASE: CPT

## 2018-03-01 PROCEDURE — 96374 THER/PROPH/DIAG INJ IV PUSH: CPT

## 2018-03-01 PROCEDURE — 83605 ASSAY OF LACTIC ACID: CPT

## 2018-03-01 PROCEDURE — 96376 TX/PRO/DX INJ SAME DRUG ADON: CPT

## 2018-03-01 PROCEDURE — 36415 COLL VENOUS BLD VENIPUNCTURE: CPT

## 2018-03-01 PROCEDURE — 80053 COMPREHEN METABOLIC PANEL: CPT

## 2018-03-01 PROCEDURE — 2580000003 HC RX 258: Performed by: EMERGENCY MEDICINE

## 2018-03-01 RX ORDER — ONDANSETRON 2 MG/ML
4 INJECTION INTRAMUSCULAR; INTRAVENOUS ONCE
Status: COMPLETED | OUTPATIENT
Start: 2018-03-01 | End: 2018-03-01

## 2018-03-01 RX ORDER — ONDANSETRON 4 MG/1
4 TABLET, ORALLY DISINTEGRATING ORAL EVERY 8 HOURS PRN
Qty: 15 TABLET | Refills: 0 | Status: SHIPPED | OUTPATIENT
Start: 2018-03-01

## 2018-03-01 RX ORDER — 0.9 % SODIUM CHLORIDE 0.9 %
1000 INTRAVENOUS SOLUTION INTRAVENOUS ONCE
Status: COMPLETED | OUTPATIENT
Start: 2018-03-01 | End: 2018-03-01

## 2018-03-01 RX ADMIN — ONDANSETRON 4 MG: 2 INJECTION INTRAMUSCULAR; INTRAVENOUS at 16:51

## 2018-03-01 RX ADMIN — SODIUM CHLORIDE 1000 ML: 9 INJECTION, SOLUTION INTRAVENOUS at 16:51

## 2018-03-01 RX ADMIN — ONDANSETRON 4 MG: 2 INJECTION INTRAMUSCULAR; INTRAVENOUS at 19:51

## 2018-03-02 NOTE — ED PROVIDER NOTES
140 [facility] EMERGENCY DEPT  eMERGENCY dEPARTMENT eNCOUnter      Pt Name: Jamarcus Graff  MRN: [id number]  [place] 1950  Date of evaluation: 3/1/2018  Provider: Madelyn German MD    [address] [place] [address]       Chief Complaint   Patient presents with    Nausea    Emesis         HISTORY OF PRESENT ILLNESS   (Location/Symptom, Timing/Onset, Context/Setting, Quality, Duration, Modifying Factors, Severity)  Note limiting factors. Jamarcus Graff is a 70 y.o. female who presents to the emergency department [facility] for evaluation of nausea and vomiting. Patient reports that the symptoms began a couple of days ago, had been fairly constant in nature. She reports associated nausea with several episodes of vomiting. Reports that she believes that she has had some loose diarrhea-like stools. She denies any chest pain, palpitations or syncope. Patient states that her knowledge she has not been on any recent antibiotic therapy. Patient currently resides at the [facility]. Patient denies any blood in the vomit. States that she's had slight decreased appetite with decreased by mouth intake. Denies any relieving factors. HPI    Nursing Notes were reviewed. REVIEW OF SYSTEMS    (2-9 systems for level 4, 10 or more for level 5)     Review of Systems   Constitutional: Negative for chills and fever. Respiratory: Negative for shortness of breath. Cardiovascular: Negative for chest pain and palpitations. Gastrointestinal: Positive for abdominal pain (mild, crampy), diarrhea, nausea and vomiting. Neurological: Negative for syncope.             PAST MEDICAL HISTORY     Past Medical History:   Diagnosis Date    Cellulitis     Gout     Hyperlipidemia     Hypertension     Hypothyroidism     Lymphadenitis     Morbid obesity ([address]     Type II or unspecified type diabetes mellitus without mention of complication, not stated as uncontrolled          SURGICAL HISTORY       Past Surgical History:   Procedure Laterality Date R-3      LANTUS 100 UNIT/ML injection vial INJECT 10 UNITS IN THE MORNING AND 60 UNITS IN THE EVENING, Disp-60 mL, R-5      allopurinol (ZYLOPRIM) 300 MG tablet TAKE 1 TABLET BY MOUTH ONCE DAILY. , Disp-90 tablet, R-3      levothyroxine (SYNTHROID) 200 MCG tablet TAKE 1 TABLET BY MOUTH ONCE DAILY. , Disp-90 tablet, R-5      BD INSULIN SYRINGE ULTRAFINE 31G X 5/16\" 0.3 ML MISC Disp-100 each, R-5, Normal      Blood Glucose Monitoring Suppl (ONETOUCH VERIO SYNC SYSTEM) W/DEVICE KIT 1 Units by Does not apply route as needed, Disp-1 kit, R-0      !! ULTICARE INSULIN SYRINGE 29G X 1/2\" 1 ML MISC Disp-60 each, R-5, Normal      CRESTOR 5 MG tablet TAKE 1 TABLET BY MOUTH ONCE DAILY. , Disp-30 tablet, R-3      BD ULTRA-FINE PEN NEEDLES 29G X 12.7MM MISC Disp-100 each, R-3, Normal       !! - Potential duplicate medications found. Please discuss with provider. ALLERGIES     Flagyl [metronidazole]; Levofloxacin; and Zithromax [azithromycin dihydrate]    FAMILY HISTORY       Family History   Problem Relation Age of Onset    Heart Disease Mother     Diabetes Father     Heart Disease Father     Cancer Maternal Aunt      breast    Heart Disease Maternal Aunt     Cancer Sister      breast          SOCIAL HISTORY       Social History     Social History    Marital status: Single     Spouse name: N/A    Number of children: N/A    Years of education: N/A     Occupational History    retired      Social History Main Topics    Smoking status: Never Smoker    Smokeless tobacco: Never Used    Alcohol use No    Drug use: No    Sexual activity: Not on file     Other Topics Concern    Not on file     Social History Narrative    No narrative on file       SCREENINGS             PHYSICAL EXAM    (up to 7 for level 4, 8 or more for level 5)     ED Triage Vitals   BP Temp Temp src Pulse Resp SpO2 Height Weight   -- -- -- -- -- -- -- --       Physical Exam   Constitutional: She is oriented to person, place, and time.  She is

## 2018-04-04 ASSESSMENT — ENCOUNTER SYMPTOMS
DIARRHEA: 1
NAUSEA: 1
ABDOMINAL PAIN: 1
VOMITING: 1
SHORTNESS OF BREATH: 0

## 2019-01-06 ENCOUNTER — APPOINTMENT (OUTPATIENT)
Dept: GENERAL RADIOLOGY | Age: 69
End: 2019-01-06
Payer: MEDICARE

## 2019-01-06 ENCOUNTER — HOSPITAL ENCOUNTER (EMERGENCY)
Age: 69
Discharge: HOME | End: 2019-01-06
Attending: FAMILY MEDICINE
Payer: MEDICARE

## 2019-01-06 VITALS
TEMPERATURE: 98.5 F | DIASTOLIC BLOOD PRESSURE: 76 MMHG | SYSTOLIC BLOOD PRESSURE: 123 MMHG | RESPIRATION RATE: 16 BRPM | OXYGEN SATURATION: 95 % | WEIGHT: 293 LBS | BODY MASS INDEX: 78.48 KG/M2 | HEART RATE: 79 BPM

## 2019-01-06 DIAGNOSIS — T14.90XA INHALATION INJURY: Primary | ICD-10-CM

## 2019-01-06 PROCEDURE — 6360000002 HC RX W HCPCS: Performed by: FAMILY MEDICINE

## 2019-01-06 PROCEDURE — 71045 X-RAY EXAM CHEST 1 VIEW: CPT

## 2019-01-06 PROCEDURE — 94664 DEMO&/EVAL PT USE INHALER: CPT

## 2019-01-06 PROCEDURE — 99283 EMERGENCY DEPT VISIT LOW MDM: CPT

## 2019-01-06 PROCEDURE — 94640 AIRWAY INHALATION TREATMENT: CPT

## 2019-01-06 PROCEDURE — 99283 EMERGENCY DEPT VISIT LOW MDM: CPT | Performed by: FAMILY MEDICINE

## 2019-01-06 RX ORDER — CLINDAMYCIN HYDROCHLORIDE 300 MG/1
300 CAPSULE ORAL 3 TIMES DAILY
Status: ON HOLD | COMMUNITY
End: 2019-03-13 | Stop reason: HOSPADM

## 2019-01-06 RX ORDER — ALBUTEROL SULFATE 2.5 MG/3ML
2.5 SOLUTION RESPIRATORY (INHALATION) EVERY 4 HOURS PRN
Status: DISCONTINUED | OUTPATIENT
Start: 2019-01-06 | End: 2019-01-06 | Stop reason: HOSPADM

## 2019-01-06 RX ORDER — HYDROXYZINE PAMOATE 25 MG/1
25 CAPSULE ORAL 3 TIMES DAILY PRN
COMMUNITY
End: 2020-03-25

## 2019-01-06 RX ORDER — ALBUTEROL SULFATE 90 UG/1
2 AEROSOL, METERED RESPIRATORY (INHALATION) EVERY 6 HOURS PRN
Qty: 1 INHALER | Refills: 3 | Status: ON HOLD | OUTPATIENT
Start: 2019-01-06 | End: 2019-03-08

## 2019-01-06 RX ADMIN — ALBUTEROL SULFATE 2.5 MG: 2.5 SOLUTION RESPIRATORY (INHALATION) at 13:29

## 2019-01-06 RX ADMIN — IPRATROPIUM BROMIDE 0.5 MG: 0.5 SOLUTION RESPIRATORY (INHALATION) at 13:29

## 2019-01-06 ASSESSMENT — ENCOUNTER SYMPTOMS
WHEEZING: 0
DIARRHEA: 0
ABDOMINAL PAIN: 0
BACK PAIN: 0
SHORTNESS OF BREATH: 0
CONSTIPATION: 0
VOMITING: 0
CHEST TIGHTNESS: 0
COUGH: 1
NAUSEA: 0
RHINORRHEA: 0
COLOR CHANGE: 0
SINUS PAIN: 0

## 2019-01-20 ENCOUNTER — HOSPITAL ENCOUNTER (EMERGENCY)
Age: 69
Discharge: HOME | End: 2019-01-20
Attending: FAMILY MEDICINE
Payer: MEDICARE

## 2019-01-20 ENCOUNTER — APPOINTMENT (OUTPATIENT)
Dept: GENERAL RADIOLOGY | Age: 69
End: 2019-01-20
Payer: MEDICARE

## 2019-01-20 VITALS
OXYGEN SATURATION: 97 % | RESPIRATION RATE: 17 BRPM | TEMPERATURE: 98.7 F | SYSTOLIC BLOOD PRESSURE: 136 MMHG | BODY MASS INDEX: 47.09 KG/M2 | WEIGHT: 293 LBS | HEIGHT: 66 IN | HEART RATE: 70 BPM | DIASTOLIC BLOOD PRESSURE: 68 MMHG

## 2019-01-20 DIAGNOSIS — R04.0 EPISTAXIS: ICD-10-CM

## 2019-01-20 DIAGNOSIS — J31.0 RHINITIS, UNSPECIFIED TYPE: Primary | ICD-10-CM

## 2019-01-20 LAB
ALBUMIN SERPL-MCNC: 3.4 G/DL (ref 3.5–5.2)
ALP BLD-CCNC: 86 U/L (ref 35–104)
ALT SERPL-CCNC: 9 U/L (ref 5–33)
ANION GAP SERPL CALCULATED.3IONS-SCNC: 8 MMOL/L (ref 7–19)
APTT: 26.9 SEC (ref 26–36.2)
AST SERPL-CCNC: 10 U/L (ref 5–32)
BASOPHILS ABSOLUTE: 0 K/UL (ref 0–0.2)
BASOPHILS RELATIVE PERCENT: 0.3 % (ref 0–1)
BILIRUB SERPL-MCNC: <0.2 MG/DL (ref 0.2–1.2)
BUN BLDV-MCNC: 21 MG/DL (ref 8–23)
CALCIUM SERPL-MCNC: 9.2 MG/DL (ref 8.8–10.2)
CHLORIDE BLD-SCNC: 103 MMOL/L (ref 98–111)
CO2: 30 MMOL/L (ref 22–29)
CREAT SERPL-MCNC: 0.8 MG/DL (ref 0.5–0.9)
EOSINOPHILS ABSOLUTE: 0.4 K/UL (ref 0–0.6)
EOSINOPHILS RELATIVE PERCENT: 4.1 % (ref 0–5)
GFR NON-AFRICAN AMERICAN: >60
GLUCOSE BLD-MCNC: 119 MG/DL (ref 74–109)
HCT VFR BLD CALC: 41 % (ref 37–47)
HEMOGLOBIN: 12.5 G/DL (ref 12–16)
INR BLD: 1.1 (ref 0.88–1.18)
LYMPHOCYTES ABSOLUTE: 1.9 K/UL (ref 1.1–4.5)
LYMPHOCYTES RELATIVE PERCENT: 21.3 % (ref 20–40)
MCH RBC QN AUTO: 28.2 PG (ref 27–31)
MCHC RBC AUTO-ENTMCNC: 30.5 G/DL (ref 33–37)
MCV RBC AUTO: 92.6 FL (ref 81–99)
MONOCYTES ABSOLUTE: 0.6 K/UL (ref 0–0.9)
MONOCYTES RELATIVE PERCENT: 7 % (ref 0–10)
NEUTROPHILS ABSOLUTE: 5.9 K/UL (ref 1.5–7.5)
NEUTROPHILS RELATIVE PERCENT: 67 % (ref 50–65)
PDW BLD-RTO: 15.1 % (ref 11.5–14.5)
PLATELET # BLD: 230 K/UL (ref 130–400)
PMV BLD AUTO: 10.2 FL (ref 9.4–12.3)
POTASSIUM SERPL-SCNC: 4 MMOL/L (ref 3.5–5)
PROTHROMBIN TIME: 13.6 SEC (ref 12–14.6)
RBC # BLD: 4.43 M/UL (ref 4.2–5.4)
SODIUM BLD-SCNC: 141 MMOL/L (ref 136–145)
TOTAL PROTEIN: 6.8 G/DL (ref 6.6–8.7)
WBC # BLD: 8.8 K/UL (ref 4.8–10.8)

## 2019-01-20 PROCEDURE — 85730 THROMBOPLASTIN TIME PARTIAL: CPT

## 2019-01-20 PROCEDURE — 85025 COMPLETE CBC W/AUTO DIFF WBC: CPT

## 2019-01-20 PROCEDURE — 71045 X-RAY EXAM CHEST 1 VIEW: CPT

## 2019-01-20 PROCEDURE — 99283 EMERGENCY DEPT VISIT LOW MDM: CPT

## 2019-01-20 PROCEDURE — 85610 PROTHROMBIN TIME: CPT

## 2019-01-20 PROCEDURE — 80053 COMPREHEN METABOLIC PANEL: CPT

## 2019-01-20 PROCEDURE — 99283 EMERGENCY DEPT VISIT LOW MDM: CPT | Performed by: FAMILY MEDICINE

## 2019-01-20 PROCEDURE — 36415 COLL VENOUS BLD VENIPUNCTURE: CPT

## 2019-01-20 RX ORDER — OXYMETAZOLINE HYDROCHLORIDE 0.05 G/100ML
2 SPRAY NASAL 2 TIMES DAILY
Qty: 14.7 ML | Refills: 0 | Status: SHIPPED | OUTPATIENT
Start: 2019-01-20 | End: 2019-01-22

## 2019-01-20 ASSESSMENT — ENCOUNTER SYMPTOMS
COLOR CHANGE: 0
COUGH: 0
CONSTIPATION: 0
CHEST TIGHTNESS: 0
SINUS PAIN: 0
NAUSEA: 0
WHEEZING: 0
ABDOMINAL PAIN: 0
DIARRHEA: 0
BACK PAIN: 0
RHINORRHEA: 0
SHORTNESS OF BREATH: 0
VOMITING: 0

## 2019-03-06 ENCOUNTER — HOSPITAL ENCOUNTER (INPATIENT)
Age: 69
LOS: 6 days | Discharge: NURSING, REHAB OR LONG-TERM CARE | DRG: 189 | End: 2019-03-13
Attending: EMERGENCY MEDICINE | Admitting: HOSPITALIST
Payer: MEDICARE

## 2019-03-06 ENCOUNTER — APPOINTMENT (OUTPATIENT)
Dept: GENERAL RADIOLOGY | Age: 69
DRG: 189 | End: 2019-03-06
Payer: MEDICARE

## 2019-03-06 DIAGNOSIS — Z79.4 TYPE 2 DIABETES MELLITUS WITH DIABETIC NEUROPATHIC ARTHROPATHY, WITH LONG-TERM CURRENT USE OF INSULIN (HCC): ICD-10-CM

## 2019-03-06 DIAGNOSIS — E66.01 MORBID OBESITY (HCC): ICD-10-CM

## 2019-03-06 DIAGNOSIS — J96.01 ACUTE RESPIRATORY FAILURE WITH HYPOXIA AND HYPERCAPNIA (HCC): Primary | ICD-10-CM

## 2019-03-06 DIAGNOSIS — J96.02 ACUTE RESPIRATORY FAILURE WITH HYPOXIA AND HYPERCAPNIA (HCC): Primary | ICD-10-CM

## 2019-03-06 DIAGNOSIS — E11.610 TYPE 2 DIABETES MELLITUS WITH DIABETIC NEUROPATHIC ARTHROPATHY, WITH LONG-TERM CURRENT USE OF INSULIN (HCC): ICD-10-CM

## 2019-03-06 DIAGNOSIS — J06.9 UPPER RESPIRATORY TRACT INFECTION, UNSPECIFIED TYPE: ICD-10-CM

## 2019-03-06 DIAGNOSIS — I10 ESSENTIAL HYPERTENSION: ICD-10-CM

## 2019-03-06 LAB
ALBUMIN SERPL-MCNC: 3.2 G/DL (ref 3.5–5.2)
ALP BLD-CCNC: 73 U/L (ref 35–104)
ALT SERPL-CCNC: 10 U/L (ref 5–33)
ANION GAP SERPL CALCULATED.3IONS-SCNC: 9 MMOL/L (ref 7–19)
AST SERPL-CCNC: 17 U/L (ref 5–32)
BASE EXCESS ARTERIAL: 6.3 MMOL/L (ref -2–2)
BASE EXCESS ARTERIAL: 6.4 MMOL/L (ref -2–2)
BASOPHILS ABSOLUTE: 0 K/UL (ref 0–0.2)
BASOPHILS RELATIVE PERCENT: 0.3 % (ref 0–1)
BILIRUB SERPL-MCNC: <0.2 MG/DL (ref 0.2–1.2)
BUN BLDV-MCNC: 17 MG/DL (ref 8–23)
CALCIUM SERPL-MCNC: 8.7 MG/DL (ref 8.8–10.2)
CARBOXYHEMOGLOBIN ARTERIAL: 2.1 % (ref 0–5)
CARBOXYHEMOGLOBIN ARTERIAL: 2.4 % (ref 0–5)
CHLORIDE BLD-SCNC: 100 MMOL/L (ref 98–111)
CO2: 31 MMOL/L (ref 22–29)
CREAT SERPL-MCNC: 1 MG/DL (ref 0.5–0.9)
D DIMER: 0.9 UG/ML FEU (ref 0–0.48)
EOSINOPHILS ABSOLUTE: 0 K/UL (ref 0–0.6)
EOSINOPHILS RELATIVE PERCENT: 0.6 % (ref 0–5)
GFR NON-AFRICAN AMERICAN: 55
GLUCOSE BLD-MCNC: 104 MG/DL (ref 70–99)
GLUCOSE BLD-MCNC: 138 MG/DL
GLUCOSE BLD-MCNC: 138 MG/DL (ref 74–109)
HCO3 ARTERIAL: 33.7 MMOL/L (ref 22–26)
HCO3 ARTERIAL: 34 MMOL/L (ref 22–26)
HCT VFR BLD CALC: 38.8 % (ref 37–47)
HEMOGLOBIN, ART, EXTENDED: 11.7 G/DL (ref 12–16)
HEMOGLOBIN, ART, EXTENDED: 12.5 G/DL (ref 12–16)
HEMOGLOBIN: 11.7 G/DL (ref 12–16)
LYMPHOCYTES ABSOLUTE: 1.9 K/UL (ref 1.1–4.5)
LYMPHOCYTES RELATIVE PERCENT: 27.6 % (ref 20–40)
MCH RBC QN AUTO: 28.7 PG (ref 27–31)
MCHC RBC AUTO-ENTMCNC: 30.2 G/DL (ref 33–37)
MCV RBC AUTO: 95.1 FL (ref 81–99)
METHEMOGLOBIN ARTERIAL: 0 %
METHEMOGLOBIN ARTERIAL: 1.2 %
MONOCYTES ABSOLUTE: 0.8 K/UL (ref 0–0.9)
MONOCYTES RELATIVE PERCENT: 12.3 % (ref 0–10)
NEUTROPHILS ABSOLUTE: 4 K/UL (ref 1.5–7.5)
NEUTROPHILS RELATIVE PERCENT: 58.5 % (ref 50–65)
O2 CONTENT ARTERIAL: 13.3 ML/DL
O2 CONTENT ARTERIAL: 16.2 ML/DL
O2 SAT, ARTERIAL: 80.9 %
O2 SAT, ARTERIAL: 92 %
O2 THERAPY: ABNORMAL
O2 THERAPY: ABNORMAL
PCO2 ARTERIAL: 61 MMHG (ref 35–45)
PCO2 ARTERIAL: 63 MMHG (ref 35–45)
PDW BLD-RTO: 15.5 % (ref 11.5–14.5)
PERFORMED ON: ABNORMAL
PH ARTERIAL: 7.34 (ref 7.35–7.45)
PH ARTERIAL: 7.35 (ref 7.35–7.45)
PLATELET # BLD: 196 K/UL (ref 130–400)
PMV BLD AUTO: 10.8 FL (ref 9.4–12.3)
PO2 ARTERIAL: 43 MMHG (ref 80–100)
PO2 ARTERIAL: 69 MMHG (ref 80–100)
POTASSIUM REFLEX MAGNESIUM: 4.8 MMOL/L (ref 3.5–5)
POTASSIUM, WHOLE BLOOD: 4
POTASSIUM, WHOLE BLOOD: 4.1
PRO-BNP: 3269 PG/ML (ref 0–900)
RAPID INFLUENZA  B AGN: NEGATIVE
RAPID INFLUENZA A AGN: NEGATIVE
RBC # BLD: 4.08 M/UL (ref 4.2–5.4)
SODIUM BLD-SCNC: 140 MMOL/L (ref 136–145)
TOTAL PROTEIN: 6.3 G/DL (ref 6.6–8.7)
TROPONIN: 0.01 NG/ML (ref 0–0.03)
WBC # BLD: 6.8 K/UL (ref 4.8–10.8)

## 2019-03-06 PROCEDURE — 6370000000 HC RX 637 (ALT 250 FOR IP): Performed by: EMERGENCY MEDICINE

## 2019-03-06 PROCEDURE — 83880 ASSAY OF NATRIURETIC PEPTIDE: CPT

## 2019-03-06 PROCEDURE — 82948 REAGENT STRIP/BLOOD GLUCOSE: CPT

## 2019-03-06 PROCEDURE — 85379 FIBRIN DEGRADATION QUANT: CPT

## 2019-03-06 PROCEDURE — 93005 ELECTROCARDIOGRAM TRACING: CPT

## 2019-03-06 PROCEDURE — 87804 INFLUENZA ASSAY W/OPTIC: CPT

## 2019-03-06 PROCEDURE — 82803 BLOOD GASES ANY COMBINATION: CPT

## 2019-03-06 PROCEDURE — 94664 DEMO&/EVAL PT USE INHALER: CPT

## 2019-03-06 PROCEDURE — 80053 COMPREHEN METABOLIC PANEL: CPT

## 2019-03-06 PROCEDURE — 71045 X-RAY EXAM CHEST 1 VIEW: CPT

## 2019-03-06 PROCEDURE — 94640 AIRWAY INHALATION TREATMENT: CPT

## 2019-03-06 PROCEDURE — 36600 WITHDRAWAL OF ARTERIAL BLOOD: CPT

## 2019-03-06 PROCEDURE — 85025 COMPLETE CBC W/AUTO DIFF WBC: CPT

## 2019-03-06 PROCEDURE — 2700000000 HC OXYGEN THERAPY PER DAY

## 2019-03-06 PROCEDURE — 84132 ASSAY OF SERUM POTASSIUM: CPT

## 2019-03-06 PROCEDURE — 99285 EMERGENCY DEPT VISIT HI MDM: CPT

## 2019-03-06 PROCEDURE — 6370000000 HC RX 637 (ALT 250 FOR IP)

## 2019-03-06 PROCEDURE — 36415 COLL VENOUS BLD VENIPUNCTURE: CPT

## 2019-03-06 PROCEDURE — 84484 ASSAY OF TROPONIN QUANT: CPT

## 2019-03-06 PROCEDURE — 02HV33Z INSERTION OF INFUSION DEVICE INTO SUPERIOR VENA CAVA, PERCUTANEOUS APPROACH: ICD-10-PCS | Performed by: EMERGENCY MEDICINE

## 2019-03-06 RX ORDER — IPRATROPIUM BROMIDE AND ALBUTEROL SULFATE 2.5; .5 MG/3ML; MG/3ML
1 SOLUTION RESPIRATORY (INHALATION) ONCE
Status: COMPLETED | OUTPATIENT
Start: 2019-03-06 | End: 2019-03-06

## 2019-03-06 RX ORDER — IPRATROPIUM BROMIDE AND ALBUTEROL SULFATE 2.5; .5 MG/3ML; MG/3ML
SOLUTION RESPIRATORY (INHALATION)
Status: COMPLETED
Start: 2019-03-06 | End: 2019-03-06

## 2019-03-06 RX ORDER — HYDROCODONE BITARTRATE AND ACETAMINOPHEN 5; 325 MG/1; MG/1
1 TABLET ORAL ONCE
Status: DISCONTINUED | OUTPATIENT
Start: 2019-03-06 | End: 2019-03-13 | Stop reason: HOSPADM

## 2019-03-06 RX ORDER — ATORVASTATIN CALCIUM 10 MG/1
10 TABLET, FILM COATED ORAL NIGHTLY
Status: DISCONTINUED | OUTPATIENT
Start: 2019-03-06 | End: 2019-03-13 | Stop reason: HOSPADM

## 2019-03-06 RX ORDER — INSULIN GLARGINE 100 [IU]/ML
50 INJECTION, SOLUTION SUBCUTANEOUS NIGHTLY
Status: DISCONTINUED | OUTPATIENT
Start: 2019-03-06 | End: 2019-03-08

## 2019-03-06 RX ADMIN — IPRATROPIUM BROMIDE AND ALBUTEROL SULFATE 1 AMPULE: .5; 3 SOLUTION RESPIRATORY (INHALATION) at 22:23

## 2019-03-06 RX ADMIN — INSULIN GLARGINE 50 UNITS: 100 INJECTION, SOLUTION SUBCUTANEOUS at 22:59

## 2019-03-06 RX ADMIN — ATORVASTATIN CALCIUM 10 MG: 10 TABLET, FILM COATED ORAL at 22:58

## 2019-03-06 RX ADMIN — IPRATROPIUM BROMIDE AND ALBUTEROL SULFATE 1 AMPULE: 2.5; .5 SOLUTION RESPIRATORY (INHALATION) at 15:20

## 2019-03-06 ASSESSMENT — ENCOUNTER SYMPTOMS
BLOOD IN STOOL: 0
APNEA: 0
FACIAL SWELLING: 0
COUGH: 1
WHEEZING: 1
EYE DISCHARGE: 0
CONSTIPATION: 0
NAUSEA: 0
CHOKING: 0
SINUS PRESSURE: 0
VOICE CHANGE: 0
DIARRHEA: 0
SORE THROAT: 0
SHORTNESS OF BREATH: 1

## 2019-03-06 NOTE — ED PROVIDER NOTES
140 [facility] EMERGENCY DEPT  eMERGENCY dEPARTMENT eNCOUnter      Pt Name: Jessica Leo  MRN: [id number]  [place] 1950  Date of evaluation: 3/6/2019  Provider: Janee Multani MD    [address] [place] [address]       Chief Complaint   Patient presents with    Shortness of Breath     presents with c/o sob         HISTORY OF PRESENT ILLNESS   (Location/Symptom, Timing/Onset,Context/Setting, Quality, Duration, Modifying Factors, Severity)  Note limiting factors. Jessica Leo is a 70 y.o. female who presents to the emergency department evaluation of shortness of breath. 67-year-old obese female resident of nursing home the past 2 years presents now with low sats shortness of breath and reported feverish symptoms. She states past 2-3 days she's had a cough and low-grade fever 101 today. She has not been checked for flu but did take a flu vaccination. She uses inhalers as needed but is not on CPAP or oxygen. She is morbidly obese and she can transfer to wheelchair with use of the sling. She is not describing any new pain symptomatology. She hasn't felt like doing her usual routine today. She states she pretty much lays in bed and listens to audiotapes for about 18 hours a day. I reviewed her past medical history. The history is provided by the patient and medical records. NursingNotes were reviewed. REVIEW OF SYSTEMS    (2-9 systems for level 4, 10 or more for level 5)     Review of Systems   Constitutional: Positive for chills and fever. HENT: Positive for congestion. Negative for drooling, facial swelling, nosebleeds, sinus pressure, sore throat and voice change. Eyes: Negative for discharge. Respiratory: Positive for cough, shortness of breath and wheezing. Negative for apnea and choking. Cardiovascular: Negative for chest pain and leg swelling. Gastrointestinal: Negative for blood in stool, constipation, diarrhea and nausea. Genitourinary: Negative for dysuria and enuresis.    Musculoskeletal: Inject 1 mL into the muscle every 30 days    DILTIAZEM (CARDIZEM CD) 180 MG EXTENDED RELEASE CAPSULE    TAKE (2) CAPSULES BY MOUTH ONCE DAILY. DOCUSATE SODIUM (COLACE) 100 MG CAPSULE    Take 1 capsule by mouth 2 times daily    FERROUS SULFATE (RHIANNON-KAN) 325 (65 FE) MG TABLET    Take 1 tablet by mouth daily (with breakfast)    FUROSEMIDE (LASIX) 40 MG TABLET    TAKE 1 TABLET BY MOUTH ONCE DAILY. GENTLE CLEANSER (CETAPHIL) LIQUID    Cleanse legs with soap and water daily    HYDROCODONE-ACETAMINOPHEN (NORCO) 5-325 MG PER TABLET    Take 1 tablet by mouth every 6 hours as needed for Pain . HYDROXYZINE (VISTARIL) 25 MG CAPSULE    Take 25 mg by mouth 3 times daily as needed for Itching    INSULIN LISPRO (HUMALOG) 100 UNIT/ML INJECTION VIAL    Inject into the skin 3 times daily (before meals)    LANTUS 100 UNIT/ML INJECTION VIAL    INJECT 10 UNITS IN THE MORNING AND 60 UNITS IN THE EVENING    LEVOTHYROXINE (SYNTHROID) 200 MCG TABLET    TAKE 1 TABLET BY MOUTH ONCE DAILY. LOSARTAN (COZAAR) 100 MG TABLET    TAKE 1 TABLET IN THE MORNING FOR HIGH BLOOD PRESSURE AND TO PROTECT KIDNEYS. ONDANSETRON (ZOFRAN ODT) 4 MG DISINTEGRATING TABLET    Take 1 tablet by mouth every 8 hours as needed for Nausea or Vomiting    ONETOUCH VERIO STRIP    TEST BLOOD SUGAR 3 TIMES A DAY. E11.9    PAROXETINE (PAXIL) 20 MG TABLET    TAKE 1 TABLET BY MOUTH IN THE MORNING. SKIN PROTECTANTS, MISC. (HYDROCERIN) CREA CREAM    Apply topically 2 times daily    TRUEPLUS LANCETS 28G MISC    USE AS DIRECTED    ULTICARE INSULIN SYRINGE 29G X 1/2\" 1 ML MISC    USE AS DIRECTED    ULTICARE INSULIN SYRINGE 29G X 1/2\" 1 ML MISC    USE AS DIRECTED       ALLERGIES     Flagyl [metronidazole];  Levofloxacin; and Zithromax [azithromycin dihydrate]    FAMILY HISTORY       Family History   Problem Relation Age of Onset    Heart Disease Mother     Diabetes Father     Heart Disease Father     Cancer Maternal Aunt         breast    Heart Disease Maternal motion. Neck supple. Cardiovascular: Normal rate, regular rhythm and normal heart sounds. Pulmonary/Chest: Effort normal. She has wheezes (a few anterior wheezes. ). Abdominal: Soft. Bowel sounds are normal.   Morbidly obese large pendulous abdomen. Chronic skin changes. Musculoskeletal: Normal range of motion. Neurological: She is alert and oriented to person, place, and time. GCS eye subscore is 4. GCS verbal subscore is 5. GCS motor subscore is 6. Skin: Skin is warm and dry. She is not diaphoretic. *Chronic skin changes on the abdomen and lower extremities. No open draining wounds on the area examined   Psychiatric: She has a normal mood and affect. Her behavior is normal. Thought content normal.   Nursing note and vitals reviewed. DIAGNOSTIC RESULTS     EKG: All EKG's are interpreted by the Emergency Department Physician who either signs or Co-signs this chart in the absence of a cardiologist.    Sinus rhythm. RADIOLOGY:   Non-plain film images such as CT, Ultrasound and MRI are read by the radiologist. Plainradiographic images are visualized and preliminarily interpreted by the emergency physician with the below findings:    I reviewed the films and findings. Interpretation per the Radiologist below, if available at the time of this note:    XR CHEST PORTABLE   Final Result   1. Cardiomegaly no acute cardiopulmonary process.    Signed by Dr Nick Kwan on 3/6/2019 4:38 PM            ED BEDSIDE ULTRASOUND:   Performed by ED Physician - none    LABS:  Labs Reviewed   BLOOD GAS, ARTERIAL - Abnormal; Notable for the following components:       Result Value    pCO2, Arterial 61.0 (*)     pO2, Arterial 43.0 (*)     HCO3, Arterial 33.7 (*)     Base Excess, Arterial 6.4 (*)     Hemoglobin, Art, Extended 11.7 (*)     O2 Sat, Arterial 80.9 (*)     All other components within normal limits    Narrative:     CALL  Subramanian  Sandra Velazquez RN ER, 03/06/2019 15:22, by HOMER SANABRIA WITH AUTO pounds but they must be less than 31 inches across in their widest chest position. Our patient measures 37 inches. [facility] in [place]. They too are limited by the patient's with. I'll ask staff to contact transfer or access Center see if they could help us locate a facility that within reason to transfer the patient to. The patient remains hemodynamically stable. There are patients that become so large, obese that they are not capable to undergo routine testing that we offered the general population. There may not be a facility that can accept this patient within a reasonable distance. And I am referring to this hemodynamically stable patient. My shift has ended. I'm leaving the care of this patient to the nighttime attending Dr. Va Sawyer. We have discussed the case. CONSULTS:  IP CONSULT TO HOSPITALIST    PROCEDURES:  Unless otherwise notedbelow, none     Procedures    FINAL IMPRESSION     1. Acute respiratory failure with hypoxia and hypercapnia (HCC)    2. Upper respiratory tract infection, unspecified type    3. Type 2 diabetes mellitus with diabetic neuropathic arthropathy, with long-term current use of insulin ([facility] [address]    4. Essential hypertension    5.  Morbid obesity ([facility] [address]          DISPOSITION/PLAN   DISPOSITION        PATIENT REFERRED TO:  @FUP@    DISCHARGE MEDICATIONS:  New Prescriptions    No medications on file          (Please note that portions of this note were completed with a voice recognition program.  Efforts were made to edit the dictations butoccasionally words are mis-transcribed.)    Sixto Austin MD (electronically signed)  AttendingEmergency Physician          Sixto Austin MD  03/07/19 3843

## 2019-03-06 NOTE — PROGRESS NOTES
Contains critical data BLOOD GAS, ARTERIAL   Status:  Final result    Ref Range & Units 03/06/19 1521   pH, Arterial 7.350 - 7.450 7.350    pCO2, Arterial 35.0 - 45.0 mmHg 61.0High     pO2, Arterial 80.0 - 100.0 mmHg 43. 0Low Panic     HCO3, Arterial 22.0 - 26.0 mmol/L 33.7High     Base Excess, Arterial -2.0 - 2.0 mmol/L 6.4High     Hemoglobin, Art, Extended 12.0 - 16.0 g/dL 11.7Low     O2 Sat, Arterial >92 % 80. 9Low Panic     Carboxyhgb, Arterial 0.0 - 5.0 % 2.1    Comment:      0.0-1.5   (Smokers 1.5-5.0)    Methemoglobin, Arterial <1.5 % 0.0    O2 Content, Arterial Not Established mL/dL 13.3    O2 Therapy  Unknown    Resulting Agency  [facility]   Narrative     CALL  [place] Maximilian Vincent RN ER, 03/06/2019 15:22, by Bree Zuniga        Specimen Collected: 03/06/19 1521 Last Resulted: 03/06/19 1522 View Other Order Details        Pt on room air, RR 24 site RR at+

## 2019-03-07 PROBLEM — R79.89 POSITIVE D DIMER: Status: ACTIVE | Noted: 2019-03-07

## 2019-03-07 PROBLEM — R09.02 HYPOXIA: Status: ACTIVE | Noted: 2019-03-07

## 2019-03-07 LAB
APTT: 30.3 SEC (ref 26–36.2)
APTT: 87.3 SEC (ref 26–36.2)
EKG P AXIS: 62 DEGREES
EKG P-R INTERVAL: 194 MS
EKG Q-T INTERVAL: 410 MS
EKG QRS DURATION: 100 MS
EKG QTC CALCULATION (BAZETT): 438 MS
EKG T AXIS: 70 DEGREES
GLUCOSE BLD-MCNC: 140 MG/DL (ref 70–99)
GLUCOSE BLD-MCNC: 204 MG/DL (ref 70–99)
HCT VFR BLD CALC: 38.6 % (ref 37–47)
HEMOGLOBIN: 11.7 G/DL (ref 12–16)
INR BLD: 1.05 (ref 0.88–1.18)
LV EF: 58 %
LVEF MODALITY: NORMAL
MCH RBC QN AUTO: 28.9 PG (ref 27–31)
MCHC RBC AUTO-ENTMCNC: 30.3 G/DL (ref 33–37)
MCV RBC AUTO: 95.3 FL (ref 81–99)
PDW BLD-RTO: 15.4 % (ref 11.5–14.5)
PERFORMED ON: ABNORMAL
PERFORMED ON: ABNORMAL
PLATELET # BLD: 194 K/UL (ref 130–400)
PMV BLD AUTO: 11.1 FL (ref 9.4–12.3)
PROTHROMBIN TIME: 13.1 SEC (ref 12–14.6)
RBC # BLD: 4.05 M/UL (ref 4.2–5.4)
WBC # BLD: 9.3 K/UL (ref 4.8–10.8)

## 2019-03-07 PROCEDURE — C8929 TTE W OR WO FOL WCON,DOPPLER: HCPCS

## 2019-03-07 PROCEDURE — 6360000002 HC RX W HCPCS: Performed by: EMERGENCY MEDICINE

## 2019-03-07 PROCEDURE — 36415 COLL VENOUS BLD VENIPUNCTURE: CPT

## 2019-03-07 PROCEDURE — 1210000000 HC MED SURG R&B

## 2019-03-07 PROCEDURE — 93970 EXTREMITY STUDY: CPT

## 2019-03-07 PROCEDURE — 6360000002 HC RX W HCPCS: Performed by: HOSPITALIST

## 2019-03-07 PROCEDURE — 6360000004 HC RX CONTRAST MEDICATION: Performed by: HOSPITALIST

## 2019-03-07 PROCEDURE — 85730 THROMBOPLASTIN TIME PARTIAL: CPT

## 2019-03-07 PROCEDURE — 82948 REAGENT STRIP/BLOOD GLUCOSE: CPT

## 2019-03-07 PROCEDURE — 99223 1ST HOSP IP/OBS HIGH 75: CPT | Performed by: HOSPITALIST

## 2019-03-07 PROCEDURE — 2700000000 HC OXYGEN THERAPY PER DAY

## 2019-03-07 PROCEDURE — 2580000003 HC RX 258: Performed by: EMERGENCY MEDICINE

## 2019-03-07 PROCEDURE — 99285 EMERGENCY DEPT VISIT HI MDM: CPT | Performed by: EMERGENCY MEDICINE

## 2019-03-07 PROCEDURE — 94664 DEMO&/EVAL PT USE INHALER: CPT

## 2019-03-07 PROCEDURE — 2580000003 HC RX 258: Performed by: HOSPITALIST

## 2019-03-07 PROCEDURE — 85027 COMPLETE CBC AUTOMATED: CPT

## 2019-03-07 PROCEDURE — 6370000000 HC RX 637 (ALT 250 FOR IP): Performed by: EMERGENCY MEDICINE

## 2019-03-07 PROCEDURE — 85610 PROTHROMBIN TIME: CPT

## 2019-03-07 PROCEDURE — 87040 BLOOD CULTURE FOR BACTERIA: CPT

## 2019-03-07 PROCEDURE — 6370000000 HC RX 637 (ALT 250 FOR IP): Performed by: HOSPITALIST

## 2019-03-07 PROCEDURE — 94640 AIRWAY INHALATION TREATMENT: CPT

## 2019-03-07 RX ORDER — HEPARIN SODIUM 1000 [USP'U]/ML
10000 INJECTION, SOLUTION INTRAVENOUS; SUBCUTANEOUS PRN
Status: DISCONTINUED | OUTPATIENT
Start: 2019-03-07 | End: 2019-03-13 | Stop reason: HOSPADM

## 2019-03-07 RX ORDER — SODIUM CHLORIDE 0.9 % (FLUSH) 0.9 %
10 SYRINGE (ML) INJECTION EVERY 12 HOURS SCHEDULED
Status: DISCONTINUED | OUTPATIENT
Start: 2019-03-07 | End: 2019-03-13 | Stop reason: HOSPADM

## 2019-03-07 RX ORDER — ONDANSETRON 2 MG/ML
4 INJECTION INTRAMUSCULAR; INTRAVENOUS EVERY 6 HOURS PRN
Status: DISCONTINUED | OUTPATIENT
Start: 2019-03-07 | End: 2019-03-13 | Stop reason: HOSPADM

## 2019-03-07 RX ORDER — HEPARIN SODIUM 1000 [USP'U]/ML
10000 INJECTION, SOLUTION INTRAVENOUS; SUBCUTANEOUS ONCE
Status: COMPLETED | OUTPATIENT
Start: 2019-03-07 | End: 2019-03-07

## 2019-03-07 RX ORDER — HEPARIN SODIUM 10000 [USP'U]/100ML
8.96 INJECTION, SOLUTION INTRAVENOUS CONTINUOUS
Status: DISCONTINUED | OUTPATIENT
Start: 2019-03-07 | End: 2019-03-13 | Stop reason: HOSPADM

## 2019-03-07 RX ORDER — DOXYCYCLINE HYCLATE 100 MG/1
100 CAPSULE ORAL ONCE
Status: COMPLETED | OUTPATIENT
Start: 2019-03-07 | End: 2019-03-07

## 2019-03-07 RX ORDER — IPRATROPIUM BROMIDE AND ALBUTEROL SULFATE 2.5; .5 MG/3ML; MG/3ML
1 SOLUTION RESPIRATORY (INHALATION) ONCE
Status: COMPLETED | OUTPATIENT
Start: 2019-03-07 | End: 2019-03-07

## 2019-03-07 RX ORDER — SODIUM CHLORIDE 0.9 % (FLUSH) 0.9 %
10 SYRINGE (ML) INJECTION PRN
Status: DISCONTINUED | OUTPATIENT
Start: 2019-03-07 | End: 2019-03-13 | Stop reason: HOSPADM

## 2019-03-07 RX ORDER — ACETAMINOPHEN 325 MG/1
650 TABLET ORAL EVERY 4 HOURS PRN
Status: DISCONTINUED | OUTPATIENT
Start: 2019-03-07 | End: 2019-03-13 | Stop reason: HOSPADM

## 2019-03-07 RX ORDER — HEPARIN SODIUM 1000 [USP'U]/ML
5000 INJECTION, SOLUTION INTRAVENOUS; SUBCUTANEOUS PRN
Status: DISCONTINUED | OUTPATIENT
Start: 2019-03-07 | End: 2019-03-13 | Stop reason: HOSPADM

## 2019-03-07 RX ORDER — IPRATROPIUM BROMIDE AND ALBUTEROL SULFATE 2.5; .5 MG/3ML; MG/3ML
1 SOLUTION RESPIRATORY (INHALATION) EVERY 4 HOURS PRN
Status: DISCONTINUED | OUTPATIENT
Start: 2019-03-07 | End: 2019-03-08

## 2019-03-07 RX ORDER — HEPARIN SODIUM 10000 [USP'U]/100ML
7.86 INJECTION, SOLUTION INTRAVENOUS CONTINUOUS
Status: DISCONTINUED | OUTPATIENT
Start: 2019-03-07 | End: 2019-03-07

## 2019-03-07 RX ADMIN — ACETAMINOPHEN 650 MG: 325 TABLET ORAL at 18:51

## 2019-03-07 RX ADMIN — HEPARIN SODIUM 6.85 UNITS/KG/HR: 10000 INJECTION, SOLUTION INTRAVENOUS at 22:50

## 2019-03-07 RX ADMIN — WATER 1 G: 1 INJECTION INTRAMUSCULAR; INTRAVENOUS; SUBCUTANEOUS at 15:41

## 2019-03-07 RX ADMIN — DOXYCYCLINE HYCLATE 100 MG: 100 CAPSULE ORAL at 15:41

## 2019-03-07 RX ADMIN — PERFLUTREN 1.65 MG: 6.52 INJECTION, SUSPENSION INTRAVENOUS at 16:00

## 2019-03-07 RX ADMIN — HEPARIN SODIUM AND DEXTROSE 7.86 UNITS/KG/HR: 10000; 5 INJECTION INTRAVENOUS at 15:40

## 2019-03-07 RX ADMIN — Medication 10 ML: at 21:26

## 2019-03-07 RX ADMIN — IPRATROPIUM BROMIDE AND ALBUTEROL SULFATE 1 AMPULE: .5; 3 SOLUTION RESPIRATORY (INHALATION) at 13:55

## 2019-03-07 RX ADMIN — ATORVASTATIN CALCIUM 10 MG: 10 TABLET, FILM COATED ORAL at 21:26

## 2019-03-07 RX ADMIN — INSULIN GLARGINE 50 UNITS: 100 INJECTION, SOLUTION SUBCUTANEOUS at 21:26

## 2019-03-07 RX ADMIN — HEPARIN SODIUM 10000 UNITS: 1000 INJECTION INTRAVENOUS; SUBCUTANEOUS at 15:41

## 2019-03-07 ASSESSMENT — PAIN SCALES - GENERAL
PAINLEVEL_OUTOF10: 3
PAINLEVEL_OUTOF10: 2
PAINLEVEL_OUTOF10: 6

## 2019-03-07 NOTE — ED PROVIDER NOTES
140 [facility] EMERGENCY DEPT  eMERGENCYdEPARTMENT eNCOUnter      Pt Name: Yaron Cardona  MRN: [id number]  [place] 1950  Date of evaluation: 3/6/2019  Agustín Silva MD    CHIEF COMPLAINT       Chief Complaint   Patient presents with    Shortness of Breath     presents with c/o sob     Care assumed from Dr. Sabina Tang. Patient is a morbidly obese 70year old female here with cough, wheezing and low grade fever. Also has associated dyspnea. Cannot rule out PE because too large for CT or VQ tables here. Multiple attempts have been made to transfer the patient elsewhere but so far no success in finding anywhere that can care for someone her size. Patient resting comfortably and in no distress at this time. PHYSICAL EXAM    (up to 7 for level 4, 8 or more for level 5)     ED Triage Vitals [03/06/19 1503]   BP Temp Temp src Pulse Resp SpO2 Height Weight   136/84 98.9 °F (37.2 °C) -- 84 20 (!) 77 % 5' 7\" (1.702 m) (!) 500 lb (226.8 kg)       Physical Exam   Constitutional: She is oriented to person, place, and time. She appears well-developed. No distress. HENT:   Head: Normocephalic and atraumatic. Eyes: Pupils are equal, round, and reactive to light. No scleral icterus. Neck: Neck supple. No JVD present. Cardiovascular: Normal rate, regular rhythm, normal heart sounds and intact distal pulses. Pulmonary/Chest: Effort normal. No respiratory distress. She has wheezes. Abdominal:   Morbidly obese   Neurological: She is alert and oriented to person, place, and time. Skin: Skin is warm and dry. Psychiatric: She has a normal mood and affect. Her behavior is normal.   Vitals reviewed. DIAGNOSTIC RESULTS       RADIOLOGY:   Non-plain film images such as CT, Ultrasound and MRI are read by the radiologist. Plain radiographic images are visualized and preliminarily interpreted by the emergency physician with the below findings:      XR CHEST PORTABLE   Final Result   1.  Cardiomegaly no acute cardiopulmonary process.    Signed by Dr Bryant Red on 3/6/2019 4:38 PM              LABS:  Labs Reviewed   BLOOD GAS, ARTERIAL - Abnormal; Notable for the following components:       Result Value    pCO2, Arterial 61.0 (*)     pO2, Arterial 43.0 (*)     HCO3, Arterial 33.7 (*)     Base Excess, Arterial 6.4 (*)     Hemoglobin, Art, Extended 11.7 (*)     O2 Sat, Arterial 80.9 (*)     All other components within normal limits    Narrative:     CALL  Moris Quevedo RN ER, 03/06/2019 15:22, by HOMER   CBC WITH AUTO DIFFERENTIAL - Abnormal; Notable for the following components:    RBC 4.08 (*)     Hemoglobin 11.7 (*)     MCHC 30.2 (*)     RDW 15.5 (*)     Monocytes % 12.3 (*)     All other components within normal limits   COMPREHENSIVE METABOLIC PANEL W/ REFLEX TO MG FOR LOW K - Abnormal; Notable for the following components:    CO2 31 (*)     Glucose 138 (*)     CREATININE 1.0 (*)     GFR Non-African American 55 (*)     Calcium 8.7 (*)     Total Protein 6.3 (*)     Alb 3.2 (*)     All other components within normal limits   BRAIN NATRIURETIC PEPTIDE - Abnormal; Notable for the following components:    Pro-BNP 3,269 (*)     All other components within normal limits   D-DIMER, QUANTITATIVE - Abnormal; Notable for the following components:    D-Dimer, Quant 0.90 (*)     All other components within normal limits   BLOOD GAS, ARTERIAL - Abnormal; Notable for the following components:    pH, Arterial 7.340 (*)     pCO2, Arterial 63.0 (*)     pO2, Arterial 69.0 (*)     HCO3, Arterial 34.0 (*)     Base Excess, Arterial 6.3 (*)     All other components within normal limits   POCT GLUCOSE - Abnormal; Notable for the following components:    POC Glucose 104 (*)     All other components within normal limits   POCT GLUCOSE - Normal   RAPID INFLUENZA A/B ANTIGENS   POTASSIUM, WHOLE BLOOD   TROPONIN   POTASSIUM, WHOLE BLOOD   POCT GLUCOSE       All other labs were within normal range or not returned as of this dictation. EMERGENCY DEPARTMENT COURSE and DIFFERENTIALDIAGNOSIS/MDM:   Vitals:    Vitals:    03/06/19 2303 03/06/19 2341 03/07/19 0237 03/07/19 0630   BP: (!) 163/63 138/78 (!) 156/55 (!) 149/67   Pulse:  70 72 70   Resp:  16 18    Temp:       SpO2: 97% 95% 96% 96%   Weight:       Height:           MDM  Despite contacting multiple facilities in multiple states, so far we have not been able to find anywhere that has the ability to do VQ or CTA on someone her size. At this point do not know if we will ever find anywhere that we can transfer the patient to. Transfer center is going to continue to look in other states to see if any facilities have capabilities to care for someone her size. Case discussed again with the hospitalist, Dr. Kristy Fagan. She is agreeable to admit. Asked that I order echo which I have done. We will empirically treat with heparin due to possibility of PE. Empiric antibiotics ordered as well. Patient resting comfortably and in no distress at this time. CONSULTS:  IP CONSULT TO HOSPITALIST    PROCEDURES:  Unlessotherwise noted below, none     Procedures    FINAL IMPRESSION      1. Acute respiratory failure with hypoxia and hypercapnia (HCC)    2. Upper respiratory tract infection, unspecified type    3. Type 2 diabetes mellitus with diabetic neuropathic arthropathy, with long-term current use of insulin ([facility] [address]    4. Essential hypertension    5. Morbid obesity ([facility] [address]          DISPOSITION/PLAN   DISPOSITION     PATIENT REFERRED TO:  No follow-up provider specified.     DISCHARGE MEDICATIONS:  New Prescriptions    No medications on file          (Please note that portions of this note were completed with a voice recognition program.  Efforts were made to edit the dictations but occasionally words are mis-transcribed.)    Renny Carlos MD (electronically signed)  Attending Emergency Physician            Renny Carlos MD  03/07/19 [id number]

## 2019-03-07 NOTE — ED NOTES
Bed: 02-A  Expected date:   Expected time:   Means of arrival:   Comments:  rm [address] Aguilar RN  03/06/19 2636

## 2019-03-07 NOTE — ED NOTES
Spoke with Laura Galicia from [facility] again. She reported that they could accommodate the patient if the patient's widest point was less than 78 cm. I placed Laura Galicia on hold to go measure patient. nelia Lizarraga, measured the pt at 104 cm. Reported this to Laura Galicia. She reported they could not accept her due not having the proper equipment to do so.      Torres Corbett RN  03/07/19 2797 [facility] [place]  03/07/19 6571

## 2019-03-07 NOTE — ED PROVIDER NOTES
is normal. Thought content normal.   Nursing note and vitals reviewed. Please see Dr. Ron Liao note for full details. DIAGNOSTIC RESULTS     EKG: All EKG's are interpreted by the Emergency Department Physician who either signs or Co-signs this chart inthe absence of a cardiologist.        RADIOLOGY:   Non-plain film imagessuch as CT, Ultrasound and MRI are read by the radiologist. Plain radiographic images are visualized and preliminarily interpreted by the emergency physician with the below findings:          XR CHEST PORTABLE   Final Result   1. Cardiomegaly no acute cardiopulmonary process.    Signed by Dr Mark Garcia on 3/6/2019 4:38 PM              LABS:  Labs Reviewed   BLOOD GAS, ARTERIAL - Abnormal; Notable for the following components:       Result Value    pCO2, Arterial 61.0 (*)     pO2, Arterial 43.0 (*)     HCO3, Arterial 33.7 (*)     Base Excess, Arterial 6.4 (*)     Hemoglobin, Art, Extended 11.7 (*)     O2 Sat, Arterial 80.9 (*)     All other components within normal limits    Narrative:     CALL  Subramanian  Sandra Stanley RN ER, 03/06/2019 15:22, by HOMER   CBC WITH AUTO DIFFERENTIAL - Abnormal; Notable for the following components:    RBC 4.08 (*)     Hemoglobin 11.7 (*)     MCHC 30.2 (*)     RDW 15.5 (*)     Monocytes % 12.3 (*)     All other components within normal limits   COMPREHENSIVE METABOLIC PANEL W/ REFLEX TO MG FOR LOW K - Abnormal; Notable for the following components:    CO2 31 (*)     Glucose 138 (*)     CREATININE 1.0 (*)     GFR Non-African American 55 (*)     Calcium 8.7 (*)     Total Protein 6.3 (*)     Alb 3.2 (*)     All other components within normal limits   BRAIN NATRIURETIC PEPTIDE - Abnormal; Notable for the following components:    Pro-BNP 3,269 (*)     All other components within normal limits   D-DIMER, QUANTITATIVE - Abnormal; Notable for the following components:    D-Dimer, Quant 0.90 (*)     All other components within normal limits   BLOOD GAS, ARTERIAL - respiratory tract infection, unspecified type    3. Type 2 diabetes mellitus with diabetic neuropathic arthropathy, with long-term current use of insulin ([facility] [address]    4. Essential hypertension    5. Morbid obesity ([facility] [address]          DISPOSITION/PLAN   DISPOSITION     PATIENT REFERRED TO:  No follow-up provider specified.     DISCHARGE MEDICATIONS:  New Prescriptions    No medications on file          (Please note that portions ofthis note were completed with a voice recognition program.  Efforts were made to edit the dictations but occasionally words are mis-transcribed.)    Andrade Dowling MD(electronically signed)  Attending Emergency Physician          Briana Gomes MD  03/07/19 7042

## 2019-03-07 NOTE — H&P
SUGAR 3 TIMES A DAY. E11.9 5/24/17  Yes Jaylyn Later, MD   diltiazem (CARDIZEM CD) 180 MG extended release capsule TAKE (2) CAPSULES BY MOUTH ONCE DAILY. 5/4/17  Yes Jaylyn Later, MD   atorvastatin (LIPITOR) 10 MG tablet TAKE 1 TABLET BY MOUTH IN THE EVENING FOR HIGH CHOLESTEROL. 3/21/17  Yes Jaylyn Later, MD   furosemide (LASIX) 40 MG tablet TAKE 1 TABLET BY MOUTH ONCE DAILY. 3/16/17  Yes GERALDINE Oneil CNP   losartan (COZAAR) 100 MG tablet TAKE 1 TABLET IN THE MORNING FOR HIGH BLOOD PRESSURE AND TO PROTECT KIDNEYS. 2/24/17  Yes Jaylyn Later, MD Urbano Rosas INSULIN SYRINGE 29G X 1/2\" 1 ML MISC USE AS DIRECTED 1/26/17  Yes Jaylyn Later, MD   PARoxetine (PAXIL) 20 MG tablet TAKE 1 TABLET BY MOUTH IN THE MORNING. 11/14/16  Yes Jaylyn Later, MD   LANTUS 100 UNIT/ML injection vial INJECT 10 UNITS IN THE MORNING AND 60 UNITS IN THE EVENING  Patient taking differently: INJECT 10 UNITS IN THE MORNING AND 50 UNITS IN THE EVENING 10/3/16  Yes Jaylyn Later, MD   allopurinol (ZYLOPRIM) 300 MG tablet TAKE 1 TABLET BY MOUTH ONCE DAILY. 10/3/16  Yes Jaylyn Later, MD   levothyroxine (SYNTHROID) 200 MCG tablet TAKE 1 TABLET BY MOUTH ONCE DAILY.  9/21/16  Yes Jaylyn Later, MD   BD INSULIN SYRINGE ULTRAFINE 31G X 5/16\" 0.3 ML MISC USE AS DIRECTED 3 TIMES A DAY. 6/9/16  Yes Jaylyn Later, MD   Blood Glucose Monitoring Suppl ([facility] E) W/DEVICE KIT 1 Units by Does not apply route as needed 12/17/15  Yes Jaylyn Later, MD Urbano Rosas INSULIN SYRINGE 29G X 1/2\" 1 ML MISC USE AS DIRECTED 2/25/15  Yes Jaylyn Later, MD   CRESTOR 5 MG tablet TAKE 1 TABLET BY MOUTH ONCE DAILY. 4/2/14  Yes Jaylyn Later, MD   BD ULTRA-FINE PEN NEEDLES 29G X 12.7MM MISC USE AS DIRECTED 7/30/13  Yes Jaylyn Later, MD   albuterol sulfate HFA (VENTOLIN HFA) 108 (90 Base) MCG/ACT inhaler Inhale 2 puffs into the lungs every 6 hours as needed for Wheezing 1/6/19 1/9/19  Abby Moulton MD   diltiazem (DILACOR XR) 180 MG XR capsule TAKE 2 CAPSULES DAILY. 1/16/14 7/1/14  Jolene Shultz MD       Allergies:    Flagyl [metronidazole]; Levofloxacin; and Zithromax [azithromycin dihydrate]    Social History:    The patient currently lives at [facility]  Tobacco:   reports that she has never smoked. She has never used smokeless tobacco.  Alcohol:   reports that she does not drink alcohol. Illicit Drugs: Never    Family History:      Problem Relation Age of Onset    Heart Disease Mother     Diabetes Father     Heart Disease Father     Cancer Maternal Aunt         breast    Heart Disease Maternal Aunt     Cancer Sister         breast       Review of Systems:   Constitutional / general:  No fever / chills / sweats  Head:  No headache / neck stiffness / trauma / visual change  Eyes:  No blurry vision / acute visual change or loss / itching / redness  ENT: No sore throat / hoarseness / nasal drainage / ear pain  CV:  No chest pain / palpitations/ orthopnea   Respiratory:  + cough / shortness of breath No sputum / hemoptysis  GI: No nausea / vomiting / abdominal pain / diarrhea / constipation  :  No dysuria / hesitancy / urgency / hematuria   Neuro: No paralysis / syncope / seizure / dysphagia / headache / paresthesias  Musculoskeletal:  No muscle weakness /joint stiffness / pain  Vascular: + edema No claudication / thrombosis / varicosities  Heme / endocrine: No easy bruising / bleeding / excessive sweating / heat or cold intolerance  Psychiatric:  + depression No anxiety / insomnia / mood changes  Skin:  No new rashes / lesions / skin hair or nail changes        Physical Examination:  BP (!) 164/75   Pulse 88   Temp 98.7 °F (37.1 °C) (Temporal)   Resp 24   Ht 5' 7\" (1.702 m)   Wt (!) 516 lb 4 oz (234.2 kg)   SpO2 91%   BMI 80.86 kg/m²   General appearance: alert, appears stated age, cooperative and no distress  Head: Normocephalic, without obvious abnormality, atraumatic  Eyes: conjunctivae/corneas clear. PERRL, EOM's intact. Ears: normal external ears  Neck: no adenopathy, no carotid bruit, no JVD, supple, symmetrical, trachea midline and thyroid not enlarged, symmetric, no tenderness/mass/nodules  Lungs: clear to auscultation bilaterally,no rales or wheezes   Heart: regular rate and rhythm, S1, S2 normal, no murmur,  regular rate and rhythm   Abdomen:soft, non-tender; non-distended normal bowel sounds no masses, no organomegaly  Exam was extremely limited due to body habitus  Extremities:Pedal edema pitting  + bilateral lymphedema Homans sign is negative, no sign of DVT  Skin: Skin color, texture, turgor normal. No rashes or lesions  Lymphatic: No palpable lymph node enlargment  Neurologic: Alert and oriented X 3, normal strength and tone. Normal symmetric reflexes.  Mental status: Alert, oriented, thought content appropriate  Cranial nerves:II-XII Grossly intact Sensory: normal Motor:grossly normal  Psychiatric:  Alert and oriented, thought content appropriate, normal insight, mood appropriate        Diagnostic Data:    CBC:  Recent Labs     03/06/19  1548 03/07/19  1623   WBC 6.8 9.3   HGB 11.7* 11.7*   HCT 38.8 38.6    194     BMP:  Recent Labs     03/06/19  1521 03/06/19  1548 03/06/19  1841   NA  --  140  --    K 4.1 4.8 4.0   CL  --  100  --    CO2  --  31*  --    BUN  --  17  --    CREATININE  --  1.0*  --    CALCIUM  --  8.7*  --      Recent Labs     03/06/19  1548   AST 17   ALT 10   BILITOT <0.2   ALKPHOS 73     Coag Panel:   Recent Labs     03/07/19  1411   INR 1.05   PROTIME 13.1   APTT 30.3     Cardiac Enzymes:   Recent Labs     03/06/19  1548   TROPONINI 0.01     ABGs:  Lab Results   Component Value Date    PHART 7.340 03/06/2019    PO2ART 69.0 03/06/2019    ECU9JBX 63.0 03/06/2019     Urinalysis:  Lab Results   Component Value Date    NITRU POSITIVE 05/31/2017    WBCUA 280 05/31/2017    BACTERIA NEGATIVE 05/31/2017    RBCUA 5 05/31/2017    BLOODU SMALL 05/31/2017    SPECGRAV 1.023 05/31/2017    GLUCOSEU Negative 05/31/2017     DDimer positive at 0.90    RAD:   VENOUS DUPLEX  There is no evidence of deep venous thrombosis (DVT) in the bilateral   lower extremity(ies).   Note only proximal femoral vessels able to be imaged with any confidence   and no thrombus seen at this level. The majority of veins below the upper  Guinevere Ards could not be imaged due to body size and habitus, therefore, this is   a limited study and could not rule out or rule in DVT. EKG: sinus   Echo: result is pending  Micro: none    Active Hospital Problems    Diagnosis Date Noted    Hypoxia [R09.02] 03/07/2019    Positive D dimer [R79.89] 03/07/2019    Ambulatory dysfunction [R26.2]     Lymphedema of lower extremity [I89.0] 10/16/2012    Morbid obesity with BMI of 70 and over, adult ([facility] [address] [E66.01, Z68.45]     Hypertension [I10]        MPRESSION / PLAN:  Active Problems:    Hypertension    Morbid obesity with BMI of 70 and over, adult ([facility] [address]    Lymphedema of lower extremity    Ambulatory dysfunction    Hypoxia    Positive D dimer  Resolved Problems:    * No resolved hospital problems. *    At this point it is impossible to rule out or in a DVT or PE. Continue heparin drip per protocol. Vascular surgery consultation for assistance with management and decision making, I discussed with Dr. Daniel Branch. Continue supplemental oxygen, rocephin and doxycycline for what sound like bronchitis. We also discussed weight loss and she seems motivated but is limited to what is served in her facility. Most certainly a high risk patient.     Maci Perez DO  3/7/2019

## 2019-03-08 ENCOUNTER — APPOINTMENT (OUTPATIENT)
Dept: GENERAL RADIOLOGY | Age: 69
DRG: 189 | End: 2019-03-08
Payer: MEDICARE

## 2019-03-08 PROBLEM — J20.8 ACUTE BRONCHITIS DUE TO OTHER SPECIFIED ORGANISMS: Status: ACTIVE | Noted: 2019-03-08

## 2019-03-08 LAB
ANION GAP SERPL CALCULATED.3IONS-SCNC: 11 MMOL/L (ref 7–19)
APTT: 49.1 SEC (ref 26–36.2)
APTT: 53.5 SEC (ref 26–36.2)
APTT: 55.5 SEC (ref 26–36.2)
APTT: 57.1 SEC (ref 26–36.2)
BASE EXCESS ARTERIAL: 9.9 MMOL/L (ref -2–2)
BASOPHILS ABSOLUTE: 0 K/UL (ref 0–0.2)
BASOPHILS RELATIVE PERCENT: 0.3 % (ref 0–1)
BUN BLDV-MCNC: 17 MG/DL (ref 8–23)
CALCIUM SERPL-MCNC: 8.3 MG/DL (ref 8.8–10.2)
CARBOXYHEMOGLOBIN ARTERIAL: 2.3 % (ref 0–5)
CHLORIDE BLD-SCNC: 100 MMOL/L (ref 98–111)
CO2: 30 MMOL/L (ref 22–29)
CREAT SERPL-MCNC: 0.8 MG/DL (ref 0.5–0.9)
EOSINOPHILS ABSOLUTE: 0.1 K/UL (ref 0–0.6)
EOSINOPHILS RELATIVE PERCENT: 1.2 % (ref 0–5)
GFR NON-AFRICAN AMERICAN: >60
GLUCOSE BLD-MCNC: 167 MG/DL (ref 70–99)
GLUCOSE BLD-MCNC: 168 MG/DL (ref 70–99)
GLUCOSE BLD-MCNC: 191 MG/DL (ref 74–109)
GLUCOSE BLD-MCNC: 209 MG/DL (ref 70–99)
GLUCOSE BLD-MCNC: 231 MG/DL (ref 70–99)
HCO3 ARTERIAL: 38.1 MMOL/L (ref 22–26)
HCT VFR BLD CALC: 39.2 % (ref 37–47)
HEMOGLOBIN, ART, EXTENDED: 12.4 G/DL (ref 12–16)
HEMOGLOBIN: 11.6 G/DL (ref 12–16)
LYMPHOCYTES ABSOLUTE: 1.5 K/UL (ref 1.1–4.5)
LYMPHOCYTES RELATIVE PERCENT: 15.5 % (ref 20–40)
MCH RBC QN AUTO: 27.8 PG (ref 27–31)
MCHC RBC AUTO-ENTMCNC: 29.6 G/DL (ref 33–37)
MCV RBC AUTO: 93.8 FL (ref 81–99)
METHEMOGLOBIN ARTERIAL: 1.6 %
MONOCYTES ABSOLUTE: 1.3 K/UL (ref 0–0.9)
MONOCYTES RELATIVE PERCENT: 13.5 % (ref 0–10)
NEUTROPHILS ABSOLUTE: 6.4 K/UL (ref 1.5–7.5)
NEUTROPHILS RELATIVE PERCENT: 68.7 % (ref 50–65)
O2 CONTENT ARTERIAL: 17.2 ML/DL
O2 SAT, ARTERIAL: 95.9 %
O2 THERAPY: ABNORMAL
PCO2 ARTERIAL: 69 MMHG (ref 35–45)
PDW BLD-RTO: 15.1 % (ref 11.5–14.5)
PERFORMED ON: ABNORMAL
PH ARTERIAL: 7.35 (ref 7.35–7.45)
PLATELET # BLD: 187 K/UL (ref 130–400)
PMV BLD AUTO: 11.1 FL (ref 9.4–12.3)
PO2 ARTERIAL: 204 MMHG (ref 80–100)
POTASSIUM REFLEX MAGNESIUM: 4.2 MMOL/L (ref 3.5–5)
POTASSIUM, WHOLE BLOOD: 3.7
RBC # BLD: 4.18 M/UL (ref 4.2–5.4)
SODIUM BLD-SCNC: 141 MMOL/L (ref 136–145)
WBC # BLD: 9.3 K/UL (ref 4.8–10.8)

## 2019-03-08 PROCEDURE — 6370000000 HC RX 637 (ALT 250 FOR IP): Performed by: HOSPITALIST

## 2019-03-08 PROCEDURE — 2700000000 HC OXYGEN THERAPY PER DAY

## 2019-03-08 PROCEDURE — 6360000002 HC RX W HCPCS: Performed by: INTERNAL MEDICINE

## 2019-03-08 PROCEDURE — 94762 N-INVAS EAR/PLS OXIMTRY CONT: CPT

## 2019-03-08 PROCEDURE — 94660 CPAP INITIATION&MGMT: CPT

## 2019-03-08 PROCEDURE — 80048 BASIC METABOLIC PNL TOTAL CA: CPT

## 2019-03-08 PROCEDURE — 71045 X-RAY EXAM CHEST 1 VIEW: CPT

## 2019-03-08 PROCEDURE — 36600 WITHDRAWAL OF ARTERIAL BLOOD: CPT

## 2019-03-08 PROCEDURE — 99221 1ST HOSP IP/OBS SF/LOW 40: CPT | Performed by: NURSE PRACTITIONER

## 2019-03-08 PROCEDURE — 84132 ASSAY OF SERUM POTASSIUM: CPT

## 2019-03-08 PROCEDURE — 93005 ELECTROCARDIOGRAM TRACING: CPT

## 2019-03-08 PROCEDURE — 94640 AIRWAY INHALATION TREATMENT: CPT

## 2019-03-08 PROCEDURE — 99233 SBSQ HOSP IP/OBS HIGH 50: CPT | Performed by: HOSPITALIST

## 2019-03-08 PROCEDURE — 2500000003 HC RX 250 WO HCPCS: Performed by: HOSPITALIST

## 2019-03-08 PROCEDURE — 36415 COLL VENOUS BLD VENIPUNCTURE: CPT

## 2019-03-08 PROCEDURE — 1210000000 HC MED SURG R&B

## 2019-03-08 PROCEDURE — 85025 COMPLETE CBC W/AUTO DIFF WBC: CPT

## 2019-03-08 PROCEDURE — 85730 THROMBOPLASTIN TIME PARTIAL: CPT

## 2019-03-08 PROCEDURE — 6360000002 HC RX W HCPCS: Performed by: HOSPITALIST

## 2019-03-08 PROCEDURE — 82803 BLOOD GASES ANY COMBINATION: CPT

## 2019-03-08 PROCEDURE — 82948 REAGENT STRIP/BLOOD GLUCOSE: CPT

## 2019-03-08 PROCEDURE — 2580000003 HC RX 258: Performed by: HOSPITALIST

## 2019-03-08 RX ORDER — METHYLPREDNISOLONE SODIUM SUCCINATE 125 MG/2ML
125 INJECTION, POWDER, LYOPHILIZED, FOR SOLUTION INTRAMUSCULAR; INTRAVENOUS ONCE
Status: COMPLETED | OUTPATIENT
Start: 2019-03-08 | End: 2019-03-08

## 2019-03-08 RX ORDER — DEXTROSE MONOHYDRATE 25 G/50ML
12.5 INJECTION, SOLUTION INTRAVENOUS PRN
Status: DISCONTINUED | OUTPATIENT
Start: 2019-03-08 | End: 2019-03-13 | Stop reason: HOSPADM

## 2019-03-08 RX ORDER — ALLOPURINOL 300 MG/1
300 TABLET ORAL DAILY
Status: DISCONTINUED | OUTPATIENT
Start: 2019-03-08 | End: 2019-03-13 | Stop reason: HOSPADM

## 2019-03-08 RX ORDER — IPRATROPIUM BROMIDE AND ALBUTEROL SULFATE 2.5; .5 MG/3ML; MG/3ML
1 SOLUTION RESPIRATORY (INHALATION) EVERY 4 HOURS
Status: DISCONTINUED | OUTPATIENT
Start: 2019-03-08 | End: 2019-03-13 | Stop reason: HOSPADM

## 2019-03-08 RX ORDER — LEVOTHYROXINE SODIUM 0.2 MG/1
200 TABLET ORAL DAILY
Status: ON HOLD | COMMUNITY
End: 2019-06-25

## 2019-03-08 RX ORDER — FUROSEMIDE 10 MG/ML
40 INJECTION INTRAMUSCULAR; INTRAVENOUS ONCE
Status: COMPLETED | OUTPATIENT
Start: 2019-03-08 | End: 2019-03-08

## 2019-03-08 RX ORDER — DOCUSATE SODIUM 100 MG/1
100 CAPSULE, LIQUID FILLED ORAL 2 TIMES DAILY
Status: DISCONTINUED | OUTPATIENT
Start: 2019-03-08 | End: 2019-03-13 | Stop reason: HOSPADM

## 2019-03-08 RX ORDER — DEXTROSE MONOHYDRATE 50 MG/ML
100 INJECTION, SOLUTION INTRAVENOUS PRN
Status: DISCONTINUED | OUTPATIENT
Start: 2019-03-08 | End: 2019-03-13 | Stop reason: HOSPADM

## 2019-03-08 RX ORDER — PAROXETINE 10 MG/1
20 TABLET, FILM COATED ORAL DAILY
Status: DISCONTINUED | OUTPATIENT
Start: 2019-03-08 | End: 2019-03-13 | Stop reason: HOSPADM

## 2019-03-08 RX ORDER — NICOTINE POLACRILEX 4 MG
15 LOZENGE BUCCAL PRN
Status: DISCONTINUED | OUTPATIENT
Start: 2019-03-08 | End: 2019-03-13 | Stop reason: HOSPADM

## 2019-03-08 RX ORDER — LEVOCETIRIZINE DIHYDROCHLORIDE 5 MG/1
5 TABLET, FILM COATED ORAL PRN
COMMUNITY

## 2019-03-08 RX ORDER — ASCORBIC ACID 500 MG
500 TABLET ORAL DAILY
COMMUNITY

## 2019-03-08 RX ORDER — LOSARTAN POTASSIUM 50 MG/1
100 TABLET ORAL DAILY
Status: DISCONTINUED | OUTPATIENT
Start: 2019-03-08 | End: 2019-03-13 | Stop reason: HOSPADM

## 2019-03-08 RX ORDER — LANOLIN ALCOHOL/MO/W.PET/CERES
CREAM (GRAM) TOPICAL 2 TIMES DAILY
Status: DISCONTINUED | OUTPATIENT
Start: 2019-03-08 | End: 2019-03-13 | Stop reason: HOSPADM

## 2019-03-08 RX ORDER — INSULIN GLARGINE 100 [IU]/ML
25 INJECTION, SOLUTION SUBCUTANEOUS 2 TIMES DAILY
Status: DISCONTINUED | OUTPATIENT
Start: 2019-03-08 | End: 2019-03-11

## 2019-03-08 RX ORDER — ATORVASTATIN CALCIUM 20 MG/1
10 TABLET, FILM COATED ORAL DAILY
Status: DISCONTINUED | OUTPATIENT
Start: 2019-03-08 | End: 2019-03-08

## 2019-03-08 RX ORDER — SENNA PLUS 8.6 MG/1
1 TABLET ORAL PRN
COMMUNITY

## 2019-03-08 RX ORDER — NYSTATIN 100000 [USP'U]/G
POWDER TOPICAL 4 TIMES DAILY
Status: ON HOLD | COMMUNITY
End: 2019-06-25

## 2019-03-08 RX ORDER — ACETAMINOPHEN 500 MG
500 TABLET ORAL EVERY 4 HOURS PRN
COMMUNITY

## 2019-03-08 RX ORDER — GUAIFENESIN AND CODEINE PHOSPHATE 100; 10 MG/5ML; MG/5ML
SOLUTION ORAL 4 TIMES DAILY PRN
Status: ON HOLD | COMMUNITY
End: 2019-07-02 | Stop reason: HOSPADM

## 2019-03-08 RX ORDER — ALBUTEROL SULFATE 90 UG/1
2 AEROSOL, METERED RESPIRATORY (INHALATION) EVERY 6 HOURS PRN
COMMUNITY

## 2019-03-08 RX ORDER — CETIRIZINE HYDROCHLORIDE 5 MG/1
5 TABLET ORAL DAILY
Status: DISCONTINUED | OUTPATIENT
Start: 2019-03-08 | End: 2019-03-13 | Stop reason: HOSPADM

## 2019-03-08 RX ORDER — INSULIN GLARGINE 100 [IU]/ML
60 INJECTION, SOLUTION SUBCUTANEOUS NIGHTLY
Status: ON HOLD | COMMUNITY
End: 2019-06-25

## 2019-03-08 RX ORDER — HYDROXYZINE PAMOATE 25 MG/1
25 CAPSULE ORAL 3 TIMES DAILY PRN
Status: DISCONTINUED | OUTPATIENT
Start: 2019-03-08 | End: 2019-03-13 | Stop reason: HOSPADM

## 2019-03-08 RX ORDER — LEVOTHYROXINE SODIUM 0.05 MG/1
200 TABLET ORAL DAILY
Status: DISCONTINUED | OUTPATIENT
Start: 2019-03-08 | End: 2019-03-13 | Stop reason: HOSPADM

## 2019-03-08 RX ORDER — DILTIAZEM HYDROCHLORIDE 180 MG/1
180 CAPSULE, COATED, EXTENDED RELEASE ORAL DAILY
Status: DISCONTINUED | OUTPATIENT
Start: 2019-03-08 | End: 2019-03-13 | Stop reason: HOSPADM

## 2019-03-08 RX ORDER — FUROSEMIDE 40 MG/1
40 TABLET ORAL DAILY
Status: DISCONTINUED | OUTPATIENT
Start: 2019-03-09 | End: 2019-03-13 | Stop reason: HOSPADM

## 2019-03-08 RX ORDER — INSULIN GLARGINE 100 [IU]/ML
30 INJECTION, SOLUTION SUBCUTANEOUS DAILY
Status: ON HOLD | COMMUNITY
End: 2019-06-25

## 2019-03-08 RX ORDER — SENNA PLUS 8.6 MG/1
1 TABLET ORAL NIGHTLY
Status: DISCONTINUED | OUTPATIENT
Start: 2019-03-08 | End: 2019-03-13 | Stop reason: HOSPADM

## 2019-03-08 RX ADMIN — DOCUSATE SODIUM 100 MG: 100 CAPSULE, LIQUID FILLED ORAL at 12:43

## 2019-03-08 RX ADMIN — INSULIN GLARGINE 25 UNITS: 100 INJECTION, SOLUTION SUBCUTANEOUS at 21:31

## 2019-03-08 RX ADMIN — IPRATROPIUM BROMIDE AND ALBUTEROL SULFATE 1 AMPULE: .5; 3 SOLUTION RESPIRATORY (INHALATION) at 22:28

## 2019-03-08 RX ADMIN — HEPARIN SODIUM 7.81 UNITS/KG/HR: 10000 INJECTION, SOLUTION INTRAVENOUS at 17:10

## 2019-03-08 RX ADMIN — PAROXETINE HYDROCHLORIDE 20 MG: 10 TABLET, FILM COATED ORAL at 12:43

## 2019-03-08 RX ADMIN — CEFTRIAXONE 1 G: 1 INJECTION, POWDER, FOR SOLUTION INTRAMUSCULAR; INTRAVENOUS at 17:07

## 2019-03-08 RX ADMIN — ACETAMINOPHEN 650 MG: 325 TABLET ORAL at 11:53

## 2019-03-08 RX ADMIN — DILTIAZEM HYDROCHLORIDE 180 MG: 180 CAPSULE, COATED, EXTENDED RELEASE ORAL at 12:43

## 2019-03-08 RX ADMIN — DOXYCYCLINE 100 MG: 100 INJECTION, POWDER, LYOPHILIZED, FOR SOLUTION INTRAVENOUS at 17:07

## 2019-03-08 RX ADMIN — DOXYCYCLINE 100 MG: 100 INJECTION, POWDER, LYOPHILIZED, FOR SOLUTION INTRAVENOUS at 10:09

## 2019-03-08 RX ADMIN — HEPARIN SODIUM 6.85 UNITS/KG/HR: 10000 INJECTION, SOLUTION INTRAVENOUS at 02:34

## 2019-03-08 RX ADMIN — DOCUSATE SODIUM 100 MG: 100 CAPSULE, LIQUID FILLED ORAL at 21:28

## 2019-03-08 RX ADMIN — WARFARIN SODIUM 7.5 MG: 5 TABLET ORAL at 17:06

## 2019-03-08 RX ADMIN — FUROSEMIDE 40 MG: 10 INJECTION, SOLUTION INTRAMUSCULAR; INTRAVENOUS at 12:43

## 2019-03-08 RX ADMIN — STANDARDIZED SENNA CONCENTRATE 8.6 MG: 8.6 TABLET ORAL at 21:28

## 2019-03-08 RX ADMIN — ATORVASTATIN CALCIUM 10 MG: 10 TABLET, FILM COATED ORAL at 21:28

## 2019-03-08 RX ADMIN — LOSARTAN POTASSIUM 100 MG: 50 TABLET ORAL at 12:43

## 2019-03-08 RX ADMIN — ALLOPURINOL 300 MG: 300 TABLET ORAL at 12:43

## 2019-03-08 RX ADMIN — IPRATROPIUM BROMIDE AND ALBUTEROL SULFATE 1 AMPULE: .5; 3 SOLUTION RESPIRATORY (INHALATION) at 15:06

## 2019-03-08 RX ADMIN — METHYLPREDNISOLONE SODIUM SUCCINATE 125 MG: 125 INJECTION, POWDER, FOR SOLUTION INTRAMUSCULAR; INTRAVENOUS at 13:07

## 2019-03-08 RX ADMIN — INSULIN LISPRO 15 UNITS: 100 INJECTION, SOLUTION INTRAVENOUS; SUBCUTANEOUS at 17:09

## 2019-03-08 RX ADMIN — IPRATROPIUM BROMIDE AND ALBUTEROL SULFATE 1 AMPULE: .5; 3 SOLUTION RESPIRATORY (INHALATION) at 02:07

## 2019-03-08 RX ADMIN — CETIRIZINE HYDROCHLORIDE 5 MG: 5 TABLET ORAL at 12:43

## 2019-03-08 RX ADMIN — Medication: at 21:28

## 2019-03-08 RX ADMIN — LEVOTHYROXINE SODIUM 200 MCG: 50 TABLET ORAL at 12:42

## 2019-03-08 RX ADMIN — Medication 10 ML: at 21:28

## 2019-03-08 RX ADMIN — FUROSEMIDE 40 MG: 10 INJECTION, SOLUTION INTRAMUSCULAR; INTRAVENOUS at 02:36

## 2019-03-08 RX ADMIN — Medication: at 15:26

## 2019-03-08 ASSESSMENT — PAIN SCALES - GENERAL
PAINLEVEL_OUTOF10: 2
PAINLEVEL_OUTOF10: 4
PAINLEVEL_OUTOF10: 6

## 2019-03-08 NOTE — PROGRESS NOTES
Results for Fuentes Nguyen (MRN [id number]) as of 3/8/2019 13:30   Ref.  Range 3/8/2019 13:26   O2 Therapy Unknown Unknown   Hemoglobin, Art, Extended Latest Ref Range: 12.0 - 16.0 g/dL 12.4   pH, Arterial Latest Ref Range: 7.350 - 7.450  7.350   pCO2, Arterial Latest Ref Range: 35.0 - 45.0 mmHg 69.0 (H)   pO2, Arterial Latest Ref Range: 80.0 - 100.0 mmHg 204.0 (H)   HCO3, Arterial Latest Ref Range: 22.0 - 26.0 mmol/L 38.1 (H)   Base Excess, Arterial Latest Ref Range: -2.0 - 2.0 mmol/L 9.9 (H)   O2 Sat, Arterial Latest Ref Range: >92 % 95.9   O2 Content, Arterial Latest Ref Range: Not Established mL/dL 17.2   Methemoglobin, Arterial Latest Ref Range: <1.5 % 1.6   Carboxyhgb, Arterial Latest Ref Range: 0.0 - 5.0 % 2.3     NRB at 15 lpm  Site RR  AT +  Placed on BIPAP 14/7 with 10 lpm will titrate oxgen for sat > 90%

## 2019-03-08 NOTE — CONSULTS
2126    sodium chloride flush 0.9 % injection 10 mL  10 mL Intravenous PRN Shanta Gutierrez MD        magnesium hydroxide (MILK OF MAGNESIA) 400 MG/5ML suspension 30 mL  30 mL Oral Daily PRN Shanta Gutierrez MD        ondansetron (ZOFRAN) injection 4 mg  4 mg Intravenous Q6H PRN Shanta Gutierrez MD        acetaminophen (TYLENOL) tablet 650 mg  650 mg Oral Q4H PRN Shanta Gutierrez MD   650 mg at 03/07/19 1851    cefTRIAXone (ROCEPHIN) 1 g in sterile water 10 mL IV syringe  1 g Intravenous Q24H Shanta Gutierrez MD        doxycycline (VIBRAMYCIN) 100 mg in dextrose 5 % 100 mL IVPB  100 mg Intravenous Q12H Maci Perez MD        heparin 25,000 units in dextrose 5% 250 mL infusion  8.96 Units/kg/hr Intravenous Continuous Shanta Gutierrez MD 16 mL/hr at 03/08/19 0454 6.848 Units/kg/hr at 03/08/19 0454    ipratropium-albuterol (DUONEB) nebulizer solution 1 ampule  1 ampule Inhalation Q4H PRN Shanta Gutierrez MD   1 ampule at 03/08/19 0207    atorvastatin (LIPITOR) tablet 10 mg  10 mg Oral Nightly Shanta Gutierrez MD   10 mg at 03/07/19 2126    HYDROcodone-acetaminophen (NORCO) 5-325 MG per tablet 1 tablet  1 tablet Oral Once Shanta Gutierrez MD   Stopped at 03/06/19 2258    insulin glargine (LANTUS) injection vial 50 Units  50 Units Subcutaneous Nightly Shanta Gutierrez MD   50 Units at 03/07/19 2126       Social History:  Reports that she has never smoked. She has never used smokeless tobacco. She reports that she does not drink alcohol or use drugs. Family History:      Problem Relation Age of Onset    Heart Disease Mother     Diabetes Father     Heart Disease Father     Cancer Maternal Aunt         breast    Heart Disease Maternal Aunt     Cancer Sister         breast       REVIEW OF SYSTEMS:  General: Denies any fever or chills. Denies any unexplained weight loss or gain. Denies any change in activity or endurance. Sedentary lifestyle; resident at nursing home.  Does get up into a wheelchair with use of a lift. HEENT: Denies any headaches or visual changes. Respiratory: Denies any cough or hoarseness. Shortness of breath. Cardiac: Denies any chest pain or pressure. Denies any palpitations. Denies any presyncope or syncope. Denies any orthopnea or PND. Chronic lower extremity lymphedema. GI: Denies any abdominal pain. Denies any nausea or vomiting. Denies any change in bowel habits. Denies any recent history of GI tract blood loss. : Denies any hematuria, frequency, hesitancy, or dysuria. Musculoskeletal: Immobility 2' to body habitus. Neurological: Denies any paraesthesias. Denies any history of seizure or stroke symptoms. Psychological: Denies any problems with anxiety or depression. All other systems are negative except where stated above. PHYSICAL EXAM:  /72   Pulse 92   Temp 98.7 °F (37.1 °C) (Temporal)   Resp 24   Ht 5' 7\" (1.702 m)   Wt (!) 516 lb 4 oz (234.2 kg)   SpO2 93%   BMI 80.86 kg/m²   General appearance: Demonstrates a pleasant, morbidly obese female who is alert and oriented in no acute distress. She is currently on 35% mask. HEENT: Normocephalic. Atraumatic. AFTAB. NECK: Full. NO JVD. No carotids bruits auscultated. Chest: Clear, diminished throughout 2' to body habitus, to auscultation bilaterally without wheezes or rhonchi. Cardiac: Normal heart tones with regular rate and rhythm, S1, S2 normal. No murmurs, gallops, or rubs auscultated. Abdomen: Obese, soft, non-tender; non-distended normal bowel sounds no masses, no organomegaly. Extremities: No clubbing or cyanosis. Chronic lymphedema with skin changes, mild erythema. Skin: Skin color, texture, turgor normal. No rashes or lesions  Neurologic: Grossly intact.     LABS AND DIAGNOSTICS:    VL Duplex, Bilateral Lower Extremities:  Evaristo Mchugh is no evidence of deep venous thrombosis (DVT) in the bilateral lower extremity(ies).   Note only proximal femoral vessels able to be imaged with any can have a CT scan to look for PE.

## 2019-03-08 NOTE — PROGRESS NOTES
Patient complaining of shortness of air. Respiratory called for breathing treatment. Hospitalist notified. See orders.

## 2019-03-09 LAB
ANION GAP SERPL CALCULATED.3IONS-SCNC: 9 MMOL/L (ref 7–19)
APTT: 54.3 SEC (ref 26–36.2)
APTT: 54.6 SEC (ref 26–36.2)
APTT: 60.9 SEC (ref 26–36.2)
APTT: 62.9 SEC (ref 26–36.2)
BASOPHILS ABSOLUTE: 0 K/UL (ref 0–0.2)
BASOPHILS RELATIVE PERCENT: 0 % (ref 0–1)
BUN BLDV-MCNC: 19 MG/DL (ref 8–23)
CALCIUM SERPL-MCNC: 8.9 MG/DL (ref 8.8–10.2)
CHLORIDE BLD-SCNC: 98 MMOL/L (ref 98–111)
CO2: 34 MMOL/L (ref 22–29)
CREAT SERPL-MCNC: 0.9 MG/DL (ref 0.5–0.9)
EOSINOPHILS ABSOLUTE: 0 K/UL (ref 0–0.6)
EOSINOPHILS RELATIVE PERCENT: 0 % (ref 0–5)
GFR NON-AFRICAN AMERICAN: >60
GLUCOSE BLD-MCNC: 254 MG/DL (ref 70–99)
GLUCOSE BLD-MCNC: 259 MG/DL (ref 70–99)
GLUCOSE BLD-MCNC: 289 MG/DL (ref 74–109)
GLUCOSE BLD-MCNC: 328 MG/DL (ref 70–99)
GLUCOSE BLD-MCNC: 370 MG/DL (ref 70–99)
HCT VFR BLD CALC: 37.8 % (ref 37–47)
HEMOGLOBIN: 11.5 G/DL (ref 12–16)
INR BLD: 1.16 (ref 0.88–1.18)
LYMPHOCYTES ABSOLUTE: 1 K/UL (ref 1.1–4.5)
LYMPHOCYTES RELATIVE PERCENT: 14.4 % (ref 20–40)
MCH RBC QN AUTO: 28.2 PG (ref 27–31)
MCHC RBC AUTO-ENTMCNC: 30.4 G/DL (ref 33–37)
MCV RBC AUTO: 92.6 FL (ref 81–99)
MONOCYTES ABSOLUTE: 0.2 K/UL (ref 0–0.9)
MONOCYTES RELATIVE PERCENT: 3.4 % (ref 0–10)
NEUTROPHILS ABSOLUTE: 5.8 K/UL (ref 1.5–7.5)
NEUTROPHILS RELATIVE PERCENT: 81.5 % (ref 50–65)
PDW BLD-RTO: 14.7 % (ref 11.5–14.5)
PERFORMED ON: ABNORMAL
PLATELET # BLD: 197 K/UL (ref 130–400)
PMV BLD AUTO: 11.2 FL (ref 9.4–12.3)
POTASSIUM SERPL-SCNC: 4.5 MMOL/L (ref 3.5–5)
PROTHROMBIN TIME: 14.2 SEC (ref 12–14.6)
RBC # BLD: 4.08 M/UL (ref 4.2–5.4)
SODIUM BLD-SCNC: 141 MMOL/L (ref 136–145)
WBC # BLD: 7.1 K/UL (ref 4.8–10.8)

## 2019-03-09 PROCEDURE — 85025 COMPLETE CBC W/AUTO DIFF WBC: CPT

## 2019-03-09 PROCEDURE — 2500000003 HC RX 250 WO HCPCS: Performed by: HOSPITALIST

## 2019-03-09 PROCEDURE — 6360000002 HC RX W HCPCS: Performed by: HOSPITALIST

## 2019-03-09 PROCEDURE — 82948 REAGENT STRIP/BLOOD GLUCOSE: CPT

## 2019-03-09 PROCEDURE — 99233 SBSQ HOSP IP/OBS HIGH 50: CPT | Performed by: HOSPITALIST

## 2019-03-09 PROCEDURE — 1210000000 HC MED SURG R&B

## 2019-03-09 PROCEDURE — 6370000000 HC RX 637 (ALT 250 FOR IP): Performed by: INTERNAL MEDICINE

## 2019-03-09 PROCEDURE — 94762 N-INVAS EAR/PLS OXIMTRY CONT: CPT

## 2019-03-09 PROCEDURE — 2700000000 HC OXYGEN THERAPY PER DAY

## 2019-03-09 PROCEDURE — 6370000000 HC RX 637 (ALT 250 FOR IP): Performed by: HOSPITALIST

## 2019-03-09 PROCEDURE — 85610 PROTHROMBIN TIME: CPT

## 2019-03-09 PROCEDURE — 80048 BASIC METABOLIC PNL TOTAL CA: CPT

## 2019-03-09 PROCEDURE — 85730 THROMBOPLASTIN TIME PARTIAL: CPT

## 2019-03-09 PROCEDURE — 94640 AIRWAY INHALATION TREATMENT: CPT

## 2019-03-09 PROCEDURE — 36415 COLL VENOUS BLD VENIPUNCTURE: CPT

## 2019-03-09 PROCEDURE — 94660 CPAP INITIATION&MGMT: CPT

## 2019-03-09 PROCEDURE — 2580000003 HC RX 258: Performed by: HOSPITALIST

## 2019-03-09 RX ADMIN — PAROXETINE HYDROCHLORIDE 20 MG: 10 TABLET, FILM COATED ORAL at 10:44

## 2019-03-09 RX ADMIN — DILTIAZEM HYDROCHLORIDE 180 MG: 180 CAPSULE, COATED, EXTENDED RELEASE ORAL at 10:44

## 2019-03-09 RX ADMIN — DOCUSATE SODIUM 100 MG: 100 CAPSULE, LIQUID FILLED ORAL at 20:07

## 2019-03-09 RX ADMIN — CEFTRIAXONE 1 G: 1 INJECTION, POWDER, FOR SOLUTION INTRAMUSCULAR; INTRAVENOUS at 15:40

## 2019-03-09 RX ADMIN — DOXYCYCLINE 100 MG: 100 INJECTION, POWDER, LYOPHILIZED, FOR SOLUTION INTRAVENOUS at 03:07

## 2019-03-09 RX ADMIN — IPRATROPIUM BROMIDE AND ALBUTEROL SULFATE 1 AMPULE: .5; 3 SOLUTION RESPIRATORY (INHALATION) at 11:09

## 2019-03-09 RX ADMIN — ALLOPURINOL 300 MG: 300 TABLET ORAL at 10:44

## 2019-03-09 RX ADMIN — Medication: at 10:51

## 2019-03-09 RX ADMIN — IPRATROPIUM BROMIDE AND ALBUTEROL SULFATE 1 AMPULE: .5; 3 SOLUTION RESPIRATORY (INHALATION) at 18:36

## 2019-03-09 RX ADMIN — Medication 10 ML: at 20:08

## 2019-03-09 RX ADMIN — Medication 10 ML: at 10:52

## 2019-03-09 RX ADMIN — DOXYCYCLINE 100 MG: 100 INJECTION, POWDER, LYOPHILIZED, FOR SOLUTION INTRAVENOUS at 15:41

## 2019-03-09 RX ADMIN — INSULIN GLARGINE 25 UNITS: 100 INJECTION, SOLUTION SUBCUTANEOUS at 10:44

## 2019-03-09 RX ADMIN — HEPARIN SODIUM 7.85 UNITS/KG/HR: 10000 INJECTION, SOLUTION INTRAVENOUS at 19:02

## 2019-03-09 RX ADMIN — LOSARTAN POTASSIUM 100 MG: 50 TABLET ORAL at 10:44

## 2019-03-09 RX ADMIN — HEPARIN SODIUM 7.86 UNITS/KG/HR: 10000 INJECTION, SOLUTION INTRAVENOUS at 06:53

## 2019-03-09 RX ADMIN — IPRATROPIUM BROMIDE AND ALBUTEROL SULFATE 1 AMPULE: .5; 3 SOLUTION RESPIRATORY (INHALATION) at 07:11

## 2019-03-09 RX ADMIN — DOCUSATE SODIUM 100 MG: 100 CAPSULE, LIQUID FILLED ORAL at 10:44

## 2019-03-09 RX ADMIN — IPRATROPIUM BROMIDE AND ALBUTEROL SULFATE 1 AMPULE: .5; 3 SOLUTION RESPIRATORY (INHALATION) at 22:38

## 2019-03-09 RX ADMIN — IPRATROPIUM BROMIDE AND ALBUTEROL SULFATE 1 AMPULE: .5; 3 SOLUTION RESPIRATORY (INHALATION) at 14:23

## 2019-03-09 RX ADMIN — INSULIN LISPRO 15 UNITS: 100 INJECTION, SOLUTION INTRAVENOUS; SUBCUTANEOUS at 12:20

## 2019-03-09 RX ADMIN — FUROSEMIDE 40 MG: 40 TABLET ORAL at 10:44

## 2019-03-09 RX ADMIN — INSULIN LISPRO 15 UNITS: 100 INJECTION, SOLUTION INTRAVENOUS; SUBCUTANEOUS at 16:41

## 2019-03-09 RX ADMIN — Medication: at 20:07

## 2019-03-09 RX ADMIN — INSULIN GLARGINE 25 UNITS: 100 INJECTION, SOLUTION SUBCUTANEOUS at 21:25

## 2019-03-09 RX ADMIN — INSULIN LISPRO 8 UNITS: 100 INJECTION, SOLUTION INTRAVENOUS; SUBCUTANEOUS at 23:33

## 2019-03-09 RX ADMIN — ATORVASTATIN CALCIUM 10 MG: 10 TABLET, FILM COATED ORAL at 20:07

## 2019-03-09 RX ADMIN — WARFARIN SODIUM 7.5 MG: 5 TABLET ORAL at 18:16

## 2019-03-09 RX ADMIN — CETIRIZINE HYDROCHLORIDE 5 MG: 5 TABLET ORAL at 10:44

## 2019-03-09 RX ADMIN — IPRATROPIUM BROMIDE AND ALBUTEROL SULFATE 1 AMPULE: .5; 3 SOLUTION RESPIRATORY (INHALATION) at 02:33

## 2019-03-09 RX ADMIN — LEVOTHYROXINE SODIUM 200 MCG: 50 TABLET ORAL at 06:56

## 2019-03-09 RX ADMIN — STANDARDIZED SENNA CONCENTRATE 8.6 MG: 8.6 TABLET ORAL at 20:07

## 2019-03-09 ASSESSMENT — PAIN SCALES - GENERAL: PAINLEVEL_OUTOF10: 3

## 2019-03-10 LAB
ANION GAP SERPL CALCULATED.3IONS-SCNC: 13 MMOL/L (ref 7–19)
APTT: 47.6 SEC (ref 26–36.2)
APTT: 49.8 SEC (ref 26–36.2)
APTT: 51.9 SEC (ref 26–36.2)
APTT: 62.6 SEC (ref 26–36.2)
BASOPHILS ABSOLUTE: 0 K/UL (ref 0–0.2)
BASOPHILS RELATIVE PERCENT: 0.1 % (ref 0–1)
BUN BLDV-MCNC: 26 MG/DL (ref 8–23)
CALCIUM SERPL-MCNC: 8.9 MG/DL (ref 8.8–10.2)
CHLORIDE BLD-SCNC: 97 MMOL/L (ref 98–111)
CO2: 29 MMOL/L (ref 22–29)
CREAT SERPL-MCNC: 0.9 MG/DL (ref 0.5–0.9)
EOSINOPHILS ABSOLUTE: 0 K/UL (ref 0–0.6)
EOSINOPHILS RELATIVE PERCENT: 0 % (ref 0–5)
GFR NON-AFRICAN AMERICAN: >60
GLUCOSE BLD-MCNC: 261 MG/DL (ref 70–99)
GLUCOSE BLD-MCNC: 276 MG/DL (ref 70–99)
GLUCOSE BLD-MCNC: 334 MG/DL (ref 70–99)
GLUCOSE BLD-MCNC: 357 MG/DL (ref 74–109)
GLUCOSE BLD-MCNC: 366 MG/DL (ref 70–99)
HCT VFR BLD CALC: 36.9 % (ref 37–47)
HEMOGLOBIN: 11.6 G/DL (ref 12–16)
INR BLD: 0.97 (ref 0.88–1.18)
LYMPHOCYTES ABSOLUTE: 1.8 K/UL (ref 1.1–4.5)
LYMPHOCYTES RELATIVE PERCENT: 17.8 % (ref 20–40)
MCH RBC QN AUTO: 28 PG (ref 27–31)
MCHC RBC AUTO-ENTMCNC: 31.4 G/DL (ref 33–37)
MCV RBC AUTO: 89.1 FL (ref 81–99)
MONOCYTES ABSOLUTE: 0.7 K/UL (ref 0–0.9)
MONOCYTES RELATIVE PERCENT: 7.5 % (ref 0–10)
NEUTROPHILS ABSOLUTE: 7.3 K/UL (ref 1.5–7.5)
NEUTROPHILS RELATIVE PERCENT: 73.7 % (ref 50–65)
PDW BLD-RTO: 14.8 % (ref 11.5–14.5)
PERFORMED ON: ABNORMAL
PLATELET # BLD: 229 K/UL (ref 130–400)
PMV BLD AUTO: 11.2 FL (ref 9.4–12.3)
POTASSIUM SERPL-SCNC: 4.1 MMOL/L (ref 3.5–5)
PROTHROMBIN TIME: 12.3 SEC (ref 12–14.6)
RBC # BLD: 4.14 M/UL (ref 4.2–5.4)
SODIUM BLD-SCNC: 139 MMOL/L (ref 136–145)
WBC # BLD: 9.8 K/UL (ref 4.8–10.8)

## 2019-03-10 PROCEDURE — 2700000000 HC OXYGEN THERAPY PER DAY

## 2019-03-10 PROCEDURE — 85025 COMPLETE CBC W/AUTO DIFF WBC: CPT

## 2019-03-10 PROCEDURE — 82948 REAGENT STRIP/BLOOD GLUCOSE: CPT

## 2019-03-10 PROCEDURE — 80048 BASIC METABOLIC PNL TOTAL CA: CPT

## 2019-03-10 PROCEDURE — 6370000000 HC RX 637 (ALT 250 FOR IP): Performed by: HOSPITALIST

## 2019-03-10 PROCEDURE — 6360000002 HC RX W HCPCS: Performed by: HOSPITALIST

## 2019-03-10 PROCEDURE — 2580000003 HC RX 258: Performed by: HOSPITALIST

## 2019-03-10 PROCEDURE — 94640 AIRWAY INHALATION TREATMENT: CPT

## 2019-03-10 PROCEDURE — 94762 N-INVAS EAR/PLS OXIMTRY CONT: CPT

## 2019-03-10 PROCEDURE — 85730 THROMBOPLASTIN TIME PARTIAL: CPT

## 2019-03-10 PROCEDURE — 1210000000 HC MED SURG R&B

## 2019-03-10 PROCEDURE — 36415 COLL VENOUS BLD VENIPUNCTURE: CPT

## 2019-03-10 PROCEDURE — 85610 PROTHROMBIN TIME: CPT

## 2019-03-10 PROCEDURE — 94660 CPAP INITIATION&MGMT: CPT

## 2019-03-10 PROCEDURE — 99233 SBSQ HOSP IP/OBS HIGH 50: CPT | Performed by: HOSPITALIST

## 2019-03-10 PROCEDURE — 6370000000 HC RX 637 (ALT 250 FOR IP): Performed by: INTERNAL MEDICINE

## 2019-03-10 PROCEDURE — 2500000003 HC RX 250 WO HCPCS: Performed by: HOSPITALIST

## 2019-03-10 RX ORDER — METHYLPREDNISOLONE SODIUM SUCCINATE 125 MG/2ML
80 INJECTION, POWDER, LYOPHILIZED, FOR SOLUTION INTRAMUSCULAR; INTRAVENOUS ONCE
Status: COMPLETED | OUTPATIENT
Start: 2019-03-10 | End: 2019-03-10

## 2019-03-10 RX ORDER — WARFARIN SODIUM 5 MG/1
10 TABLET ORAL
Status: COMPLETED | OUTPATIENT
Start: 2019-03-10 | End: 2019-03-10

## 2019-03-10 RX ORDER — INSULIN GLARGINE 100 [IU]/ML
25 INJECTION, SOLUTION SUBCUTANEOUS ONCE
Status: COMPLETED | OUTPATIENT
Start: 2019-03-10 | End: 2019-03-10

## 2019-03-10 RX ADMIN — Medication 25 UNITS: at 23:05

## 2019-03-10 RX ADMIN — LOSARTAN POTASSIUM 100 MG: 50 TABLET ORAL at 08:18

## 2019-03-10 RX ADMIN — INSULIN GLARGINE 25 UNITS: 100 INJECTION, SOLUTION SUBCUTANEOUS at 21:31

## 2019-03-10 RX ADMIN — LEVOTHYROXINE SODIUM 200 MCG: 50 TABLET ORAL at 05:48

## 2019-03-10 RX ADMIN — METHYLPREDNISOLONE SODIUM SUCCINATE 80 MG: 125 INJECTION, POWDER, FOR SOLUTION INTRAMUSCULAR; INTRAVENOUS at 16:57

## 2019-03-10 RX ADMIN — INSULIN LISPRO 15 UNITS: 100 INJECTION, SOLUTION INTRAVENOUS; SUBCUTANEOUS at 16:57

## 2019-03-10 RX ADMIN — IPRATROPIUM BROMIDE AND ALBUTEROL SULFATE 1 AMPULE: .5; 3 SOLUTION RESPIRATORY (INHALATION) at 14:33

## 2019-03-10 RX ADMIN — Medication 10 ML: at 08:21

## 2019-03-10 RX ADMIN — Medication 10 ML: at 22:13

## 2019-03-10 RX ADMIN — STANDARDIZED SENNA CONCENTRATE 8.6 MG: 8.6 TABLET ORAL at 21:30

## 2019-03-10 RX ADMIN — CEFTRIAXONE 1 G: 1 INJECTION, POWDER, FOR SOLUTION INTRAMUSCULAR; INTRAVENOUS at 16:56

## 2019-03-10 RX ADMIN — FUROSEMIDE 40 MG: 40 TABLET ORAL at 08:18

## 2019-03-10 RX ADMIN — ALLOPURINOL 300 MG: 300 TABLET ORAL at 08:18

## 2019-03-10 RX ADMIN — INSULIN GLARGINE 25 UNITS: 100 INJECTION, SOLUTION SUBCUTANEOUS at 08:19

## 2019-03-10 RX ADMIN — Medication: at 22:14

## 2019-03-10 RX ADMIN — HEPARIN SODIUM 8.84 UNITS/KG/HR: 10000 INJECTION, SOLUTION INTRAVENOUS at 22:52

## 2019-03-10 RX ADMIN — HEPARIN SODIUM 5000 UNITS: 1000 INJECTION INTRAVENOUS; SUBCUTANEOUS at 10:09

## 2019-03-10 RX ADMIN — DILTIAZEM HYDROCHLORIDE 180 MG: 180 CAPSULE, COATED, EXTENDED RELEASE ORAL at 08:18

## 2019-03-10 RX ADMIN — DOXYCYCLINE 100 MG: 100 INJECTION, POWDER, LYOPHILIZED, FOR SOLUTION INTRAVENOUS at 16:56

## 2019-03-10 RX ADMIN — IPRATROPIUM BROMIDE AND ALBUTEROL SULFATE 1 AMPULE: .5; 3 SOLUTION RESPIRATORY (INHALATION) at 18:20

## 2019-03-10 RX ADMIN — IPRATROPIUM BROMIDE AND ALBUTEROL SULFATE 1 AMPULE: .5; 3 SOLUTION RESPIRATORY (INHALATION) at 03:17

## 2019-03-10 RX ADMIN — CETIRIZINE HYDROCHLORIDE 5 MG: 5 TABLET ORAL at 08:19

## 2019-03-10 RX ADMIN — IPRATROPIUM BROMIDE AND ALBUTEROL SULFATE 1 AMPULE: .5; 3 SOLUTION RESPIRATORY (INHALATION) at 10:41

## 2019-03-10 RX ADMIN — WARFARIN SODIUM 10 MG: 5 TABLET ORAL at 16:57

## 2019-03-10 RX ADMIN — IPRATROPIUM BROMIDE AND ALBUTEROL SULFATE 1 AMPULE: .5; 3 SOLUTION RESPIRATORY (INHALATION) at 06:31

## 2019-03-10 RX ADMIN — IPRATROPIUM BROMIDE AND ALBUTEROL SULFATE 1 AMPULE: .5; 3 SOLUTION RESPIRATORY (INHALATION) at 22:10

## 2019-03-10 RX ADMIN — INSULIN LISPRO 15 UNITS: 100 INJECTION, SOLUTION INTRAVENOUS; SUBCUTANEOUS at 12:43

## 2019-03-10 RX ADMIN — PAROXETINE HYDROCHLORIDE 20 MG: 10 TABLET, FILM COATED ORAL at 08:18

## 2019-03-10 RX ADMIN — INSULIN LISPRO 15 UNITS: 100 INJECTION, SOLUTION INTRAVENOUS; SUBCUTANEOUS at 08:19

## 2019-03-10 RX ADMIN — DOXYCYCLINE 100 MG: 100 INJECTION, POWDER, LYOPHILIZED, FOR SOLUTION INTRAVENOUS at 03:29

## 2019-03-10 RX ADMIN — DOCUSATE SODIUM 100 MG: 100 CAPSULE, LIQUID FILLED ORAL at 21:30

## 2019-03-10 RX ADMIN — ATORVASTATIN CALCIUM 10 MG: 10 TABLET, FILM COATED ORAL at 21:30

## 2019-03-10 RX ADMIN — HEPARIN SODIUM 8.85 UNITS/KG/HR: 10000 INJECTION, SOLUTION INTRAVENOUS at 10:08

## 2019-03-10 ASSESSMENT — PAIN SCALES - GENERAL: PAINLEVEL_OUTOF10: 0

## 2019-03-11 LAB
ANION GAP SERPL CALCULATED.3IONS-SCNC: 9 MMOL/L (ref 7–19)
APTT: 58.7 SEC (ref 26–36.2)
APTT: 59.7 SEC (ref 26–36.2)
APTT: 67.3 SEC (ref 26–36.2)
APTT: 77.3 SEC (ref 26–36.2)
BASOPHILS ABSOLUTE: 0 K/UL (ref 0–0.2)
BASOPHILS RELATIVE PERCENT: 0.1 % (ref 0–1)
BUN BLDV-MCNC: 26 MG/DL (ref 8–23)
CALCIUM SERPL-MCNC: 9.2 MG/DL (ref 8.8–10.2)
CHLORIDE BLD-SCNC: 96 MMOL/L (ref 98–111)
CO2: 35 MMOL/L (ref 22–29)
CREAT SERPL-MCNC: 0.8 MG/DL (ref 0.5–0.9)
EOSINOPHILS ABSOLUTE: 0 K/UL (ref 0–0.6)
EOSINOPHILS RELATIVE PERCENT: 0 % (ref 0–5)
GFR NON-AFRICAN AMERICAN: >60
GLUCOSE BLD-MCNC: 322 MG/DL (ref 70–99)
GLUCOSE BLD-MCNC: 324 MG/DL (ref 70–99)
GLUCOSE BLD-MCNC: 345 MG/DL (ref 70–99)
GLUCOSE BLD-MCNC: 352 MG/DL (ref 74–109)
GLUCOSE BLD-MCNC: 369 MG/DL (ref 70–99)
HCT VFR BLD CALC: 38.4 % (ref 37–47)
HEMOGLOBIN: 11.7 G/DL (ref 12–16)
INR BLD: 1.29 (ref 0.88–1.18)
LYMPHOCYTES ABSOLUTE: 0.8 K/UL (ref 1.1–4.5)
LYMPHOCYTES RELATIVE PERCENT: 10.1 % (ref 20–40)
MCH RBC QN AUTO: 28 PG (ref 27–31)
MCHC RBC AUTO-ENTMCNC: 30.5 G/DL (ref 33–37)
MCV RBC AUTO: 91.9 FL (ref 81–99)
MONOCYTES ABSOLUTE: 0.1 K/UL (ref 0–0.9)
MONOCYTES RELATIVE PERCENT: 1.4 % (ref 0–10)
NEUTROPHILS ABSOLUTE: 6.9 K/UL (ref 1.5–7.5)
NEUTROPHILS RELATIVE PERCENT: 87.4 % (ref 50–65)
PDW BLD-RTO: 15 % (ref 11.5–14.5)
PERFORMED ON: ABNORMAL
PLATELET # BLD: 252 K/UL (ref 130–400)
PMV BLD AUTO: 11.6 FL (ref 9.4–12.3)
POTASSIUM SERPL-SCNC: 4.5 MMOL/L (ref 3.5–5)
PROTHROMBIN TIME: 15.4 SEC (ref 12–14.6)
RBC # BLD: 4.18 M/UL (ref 4.2–5.4)
REASON FOR REJECTION: NORMAL
REJECTED TEST: NORMAL
SODIUM BLD-SCNC: 140 MMOL/L (ref 136–145)
WBC # BLD: 7.9 K/UL (ref 4.8–10.8)

## 2019-03-11 PROCEDURE — 6370000000 HC RX 637 (ALT 250 FOR IP): Performed by: HOSPITALIST

## 2019-03-11 PROCEDURE — 1210000000 HC MED SURG R&B

## 2019-03-11 PROCEDURE — 2580000003 HC RX 258: Performed by: HOSPITALIST

## 2019-03-11 PROCEDURE — 94660 CPAP INITIATION&MGMT: CPT

## 2019-03-11 PROCEDURE — 94640 AIRWAY INHALATION TREATMENT: CPT

## 2019-03-11 PROCEDURE — 2500000003 HC RX 250 WO HCPCS: Performed by: HOSPITALIST

## 2019-03-11 PROCEDURE — 80048 BASIC METABOLIC PNL TOTAL CA: CPT

## 2019-03-11 PROCEDURE — 82948 REAGENT STRIP/BLOOD GLUCOSE: CPT

## 2019-03-11 PROCEDURE — 2700000000 HC OXYGEN THERAPY PER DAY

## 2019-03-11 PROCEDURE — 85730 THROMBOPLASTIN TIME PARTIAL: CPT

## 2019-03-11 PROCEDURE — 85025 COMPLETE CBC W/AUTO DIFF WBC: CPT

## 2019-03-11 PROCEDURE — 85610 PROTHROMBIN TIME: CPT

## 2019-03-11 PROCEDURE — 36415 COLL VENOUS BLD VENIPUNCTURE: CPT

## 2019-03-11 PROCEDURE — 94762 N-INVAS EAR/PLS OXIMTRY CONT: CPT

## 2019-03-11 PROCEDURE — 6360000002 HC RX W HCPCS: Performed by: HOSPITALIST

## 2019-03-11 PROCEDURE — 99232 SBSQ HOSP IP/OBS MODERATE 35: CPT | Performed by: HOSPITALIST

## 2019-03-11 RX ORDER — INSULIN GLARGINE 100 [IU]/ML
25 INJECTION, SOLUTION SUBCUTANEOUS DAILY
Status: DISCONTINUED | OUTPATIENT
Start: 2019-03-12 | End: 2019-03-13 | Stop reason: HOSPADM

## 2019-03-11 RX ORDER — WARFARIN SODIUM 5 MG/1
10 TABLET ORAL
Status: COMPLETED | OUTPATIENT
Start: 2019-03-11 | End: 2019-03-11

## 2019-03-11 RX ORDER — INSULIN GLARGINE 100 [IU]/ML
60 INJECTION, SOLUTION SUBCUTANEOUS NIGHTLY
Status: DISCONTINUED | OUTPATIENT
Start: 2019-03-11 | End: 2019-03-13 | Stop reason: HOSPADM

## 2019-03-11 RX ORDER — POLYETHYLENE GLYCOL 3350 17 G/17G
17 POWDER, FOR SOLUTION ORAL DAILY
Status: DISCONTINUED | OUTPATIENT
Start: 2019-03-11 | End: 2019-03-13 | Stop reason: HOSPADM

## 2019-03-11 RX ADMIN — ATORVASTATIN CALCIUM 10 MG: 10 TABLET, FILM COATED ORAL at 22:01

## 2019-03-11 RX ADMIN — FUROSEMIDE 40 MG: 40 TABLET ORAL at 08:04

## 2019-03-11 RX ADMIN — CETIRIZINE HYDROCHLORIDE 5 MG: 5 TABLET ORAL at 08:04

## 2019-03-11 RX ADMIN — DOCUSATE SODIUM 100 MG: 100 CAPSULE, LIQUID FILLED ORAL at 22:02

## 2019-03-11 RX ADMIN — INSULIN LISPRO 15 UNITS: 100 INJECTION, SOLUTION INTRAVENOUS; SUBCUTANEOUS at 07:01

## 2019-03-11 RX ADMIN — DOCUSATE SODIUM 100 MG: 100 CAPSULE, LIQUID FILLED ORAL at 08:04

## 2019-03-11 RX ADMIN — DOXYCYCLINE 100 MG: 100 INJECTION, POWDER, LYOPHILIZED, FOR SOLUTION INTRAVENOUS at 03:09

## 2019-03-11 RX ADMIN — STANDARDIZED SENNA CONCENTRATE 8.6 MG: 8.6 TABLET ORAL at 22:02

## 2019-03-11 RX ADMIN — PAROXETINE HYDROCHLORIDE 20 MG: 10 TABLET, FILM COATED ORAL at 08:04

## 2019-03-11 RX ADMIN — INSULIN LISPRO 15 UNITS: 100 INJECTION, SOLUTION INTRAVENOUS; SUBCUTANEOUS at 12:12

## 2019-03-11 RX ADMIN — DILTIAZEM HYDROCHLORIDE 180 MG: 180 CAPSULE, COATED, EXTENDED RELEASE ORAL at 08:05

## 2019-03-11 RX ADMIN — HEPARIN SODIUM 20.7 UNITS/KG/HR: 10000 INJECTION, SOLUTION INTRAVENOUS at 12:39

## 2019-03-11 RX ADMIN — CEFTRIAXONE 1 G: 1 INJECTION, POWDER, FOR SOLUTION INTRAMUSCULAR; INTRAVENOUS at 16:32

## 2019-03-11 RX ADMIN — Medication: at 09:37

## 2019-03-11 RX ADMIN — IPRATROPIUM BROMIDE AND ALBUTEROL SULFATE 1 AMPULE: .5; 3 SOLUTION RESPIRATORY (INHALATION) at 07:37

## 2019-03-11 RX ADMIN — ALLOPURINOL 300 MG: 300 TABLET ORAL at 08:05

## 2019-03-11 RX ADMIN — IPRATROPIUM BROMIDE AND ALBUTEROL SULFATE 1 AMPULE: .5; 3 SOLUTION RESPIRATORY (INHALATION) at 15:40

## 2019-03-11 RX ADMIN — LEVOTHYROXINE SODIUM 200 MCG: 50 TABLET ORAL at 07:00

## 2019-03-11 RX ADMIN — INSULIN LISPRO 15 UNITS: 100 INJECTION, SOLUTION INTRAVENOUS; SUBCUTANEOUS at 16:35

## 2019-03-11 RX ADMIN — IPRATROPIUM BROMIDE AND ALBUTEROL SULFATE 1 AMPULE: .5; 3 SOLUTION RESPIRATORY (INHALATION) at 02:33

## 2019-03-11 RX ADMIN — POLYETHYLENE GLYCOL 3350 17 G: 17 POWDER, FOR SOLUTION ORAL at 16:32

## 2019-03-11 RX ADMIN — WARFARIN SODIUM 10 MG: 5 TABLET ORAL at 18:46

## 2019-03-11 RX ADMIN — IPRATROPIUM BROMIDE AND ALBUTEROL SULFATE 1 AMPULE: .5; 3 SOLUTION RESPIRATORY (INHALATION) at 23:08

## 2019-03-11 RX ADMIN — MAGNESIUM HYDROXIDE 30 ML: 400 SUSPENSION ORAL at 16:32

## 2019-03-11 RX ADMIN — INSULIN GLARGINE 25 UNITS: 100 INJECTION, SOLUTION SUBCUTANEOUS at 08:04

## 2019-03-11 RX ADMIN — Medication: at 22:06

## 2019-03-11 RX ADMIN — IPRATROPIUM BROMIDE AND ALBUTEROL SULFATE 1 AMPULE: .5; 3 SOLUTION RESPIRATORY (INHALATION) at 19:30

## 2019-03-11 RX ADMIN — LOSARTAN POTASSIUM 100 MG: 50 TABLET ORAL at 08:04

## 2019-03-11 RX ADMIN — INSULIN GLARGINE 60 UNITS: 100 INJECTION, SOLUTION SUBCUTANEOUS at 22:02

## 2019-03-11 RX ADMIN — Medication 10 ML: at 22:06

## 2019-03-11 RX ADMIN — IPRATROPIUM BROMIDE AND ALBUTEROL SULFATE 1 AMPULE: .5; 3 SOLUTION RESPIRATORY (INHALATION) at 11:14

## 2019-03-11 RX ADMIN — DOXYCYCLINE 100 MG: 100 INJECTION, POWDER, LYOPHILIZED, FOR SOLUTION INTRAVENOUS at 16:33

## 2019-03-11 NOTE — PROGRESS NOTES
cefTRIAXone (ROCEPHIN) 1 g in sterile water 10 mL IV syringe  1 g Intravenous Q24H Елена Fiore MD   1 g at 03/10/19 1656    doxycycline (VIBRAMYCIN) 100 mg in dextrose 5 % 100 mL IVPB  100 mg Intravenous Q12H Елена Fiore MD   Stopped at 03/11/19 0409    heparin 25,000 units in dextrose 5% 250 mL infusion  8.96 Units/kg/hr Intravenous Continuous Елена Fiore MD 20.7 mL/hr at 03/10/19 2252 8.839 Units/kg/hr at 03/10/19 2252    atorvastatin (LIPITOR) tablet 10 mg  10 mg Oral Nightly Елена Fiore MD   10 mg at 03/10/19 2130    HYDROcodone-acetaminophen (NORCO) 5-325 MG per tablet 1 tablet  1 tablet Oral Once Елена Fiore MD   Stopped at 03/06/19 2258        Objective:   Vitals: BP (!) 165/89   Pulse 85   Temp 97.4 °F (36.3 °C) (Temporal)   Resp 22   Ht 5' 7\" (1.702 m)   Wt (!) 509 lb 1 oz (230.9 kg)   SpO2 96%   BMI 79.73 kg/m²   24HR INTAKE/OUTPUT:      Intake/Output Summary (Last 24 hours) at 3/11/2019 0715  Last data filed at 3/11/2019 2122  Gross per 24 hour   Intake 1537.6 ml   Output 1000 ml   Net 537.6 ml     DIET CARDIAC; Carb Control: 3 carb choices (45 gms)/meal  Bowel Movement/Flatus PTA    General appearance: alert, appears stated age, cooperative and zero respiratory distress  Head: Normocephalic, without obvious abnormality, atraumatic  Eyes: conjunctivae/corneas clear. PERRL, EOM's intact.    Ears: normal external ears and nose, throat without exudate  Neck: no adenopathy, no carotid bruit, no JVD, supple, symmetrical, trachea midline and thyroid not enlarged, symmetric, no tenderness/mass/nodules  Lungs: diminished bilaterally scant wheeze   Heart: regular rate and rhythm, S1, S2 normal, no murmur,  No palpable thrill   Abdomen:soft, non-tender; non-distended normal bowel sounds no masses, no organomegaly  Extremities:Pedal edema 3+ unchanged  Homans sign is negative, no sign of DVT  Skin: Skin color, texture, turgor normal. No rashes or lesions  Lymphatic: bilateral

## 2019-03-12 LAB
ANION GAP SERPL CALCULATED.3IONS-SCNC: 9 MMOL/L (ref 7–19)
APTT: 33.4 SEC (ref 26–36.2)
APTT: 46.2 SEC (ref 26–36.2)
APTT: 57.5 SEC (ref 26–36.2)
APTT: 66.2 SEC (ref 26–36.2)
BASOPHILS ABSOLUTE: 0 K/UL (ref 0–0.2)
BASOPHILS RELATIVE PERCENT: 0.3 % (ref 0–1)
BLOOD CULTURE, ROUTINE: NORMAL
BUN BLDV-MCNC: 27 MG/DL (ref 8–23)
CALCIUM SERPL-MCNC: 9.7 MG/DL (ref 8.8–10.2)
CHLORIDE BLD-SCNC: 97 MMOL/L (ref 98–111)
CO2: 36 MMOL/L (ref 22–29)
CREAT SERPL-MCNC: 0.9 MG/DL (ref 0.5–0.9)
CULTURE, BLOOD 2: NORMAL
EOSINOPHILS ABSOLUTE: 0 K/UL (ref 0–0.6)
EOSINOPHILS RELATIVE PERCENT: 0.2 % (ref 0–5)
GFR NON-AFRICAN AMERICAN: >60
GLUCOSE BLD-MCNC: 104 MG/DL (ref 70–99)
GLUCOSE BLD-MCNC: 115 MG/DL (ref 70–99)
GLUCOSE BLD-MCNC: 193 MG/DL (ref 70–99)
GLUCOSE BLD-MCNC: 270 MG/DL (ref 70–99)
GLUCOSE BLD-MCNC: 292 MG/DL (ref 74–109)
GLUCOSE BLD-MCNC: 82 MG/DL (ref 70–99)
HCT VFR BLD CALC: 38.5 % (ref 37–47)
HEMOGLOBIN: 11.7 G/DL (ref 12–16)
INR BLD: 1.75 (ref 0.88–1.18)
LYMPHOCYTES ABSOLUTE: 2.2 K/UL (ref 1.1–4.5)
LYMPHOCYTES RELATIVE PERCENT: 20.3 % (ref 20–40)
MCH RBC QN AUTO: 28.3 PG (ref 27–31)
MCHC RBC AUTO-ENTMCNC: 30.4 G/DL (ref 33–37)
MCV RBC AUTO: 93 FL (ref 81–99)
MONOCYTES ABSOLUTE: 0.9 K/UL (ref 0–0.9)
MONOCYTES RELATIVE PERCENT: 8.5 % (ref 0–10)
NEUTROPHILS ABSOLUTE: 7.6 K/UL (ref 1.5–7.5)
NEUTROPHILS RELATIVE PERCENT: 69 % (ref 50–65)
PDW BLD-RTO: 14.8 % (ref 11.5–14.5)
PERFORMED ON: ABNORMAL
PERFORMED ON: NORMAL
PLATELET # BLD: 268 K/UL (ref 130–400)
PMV BLD AUTO: 11.4 FL (ref 9.4–12.3)
POTASSIUM SERPL-SCNC: 4.5 MMOL/L (ref 3.5–5)
PROTHROMBIN TIME: 19.7 SEC (ref 12–14.6)
RBC # BLD: 4.14 M/UL (ref 4.2–5.4)
SODIUM BLD-SCNC: 142 MMOL/L (ref 136–145)
WBC # BLD: 11 K/UL (ref 4.8–10.8)

## 2019-03-12 PROCEDURE — 6370000000 HC RX 637 (ALT 250 FOR IP): Performed by: HOSPITALIST

## 2019-03-12 PROCEDURE — 99232 SBSQ HOSP IP/OBS MODERATE 35: CPT | Performed by: HOSPITALIST

## 2019-03-12 PROCEDURE — 94762 N-INVAS EAR/PLS OXIMTRY CONT: CPT

## 2019-03-12 PROCEDURE — 2700000000 HC OXYGEN THERAPY PER DAY

## 2019-03-12 PROCEDURE — 85610 PROTHROMBIN TIME: CPT

## 2019-03-12 PROCEDURE — 82948 REAGENT STRIP/BLOOD GLUCOSE: CPT

## 2019-03-12 PROCEDURE — 94640 AIRWAY INHALATION TREATMENT: CPT

## 2019-03-12 PROCEDURE — 80048 BASIC METABOLIC PNL TOTAL CA: CPT

## 2019-03-12 PROCEDURE — 85730 THROMBOPLASTIN TIME PARTIAL: CPT

## 2019-03-12 PROCEDURE — 1210000000 HC MED SURG R&B

## 2019-03-12 PROCEDURE — 36415 COLL VENOUS BLD VENIPUNCTURE: CPT

## 2019-03-12 PROCEDURE — 2500000003 HC RX 250 WO HCPCS: Performed by: HOSPITALIST

## 2019-03-12 PROCEDURE — 85025 COMPLETE CBC W/AUTO DIFF WBC: CPT

## 2019-03-12 PROCEDURE — 2580000003 HC RX 258: Performed by: HOSPITALIST

## 2019-03-12 PROCEDURE — 94660 CPAP INITIATION&MGMT: CPT

## 2019-03-12 PROCEDURE — 6360000002 HC RX W HCPCS: Performed by: HOSPITALIST

## 2019-03-12 RX ORDER — WARFARIN SODIUM 5 MG/1
10 TABLET ORAL
Status: COMPLETED | OUTPATIENT
Start: 2019-03-12 | End: 2019-03-12

## 2019-03-12 RX ADMIN — DILTIAZEM HYDROCHLORIDE 180 MG: 180 CAPSULE, COATED, EXTENDED RELEASE ORAL at 10:28

## 2019-03-12 RX ADMIN — HEPARIN SODIUM 8.84 UNITS/KG/HR: 10000 INJECTION, SOLUTION INTRAVENOUS at 00:17

## 2019-03-12 RX ADMIN — IPRATROPIUM BROMIDE AND ALBUTEROL SULFATE 1 AMPULE: .5; 3 SOLUTION RESPIRATORY (INHALATION) at 10:36

## 2019-03-12 RX ADMIN — WARFARIN SODIUM 10 MG: 5 TABLET ORAL at 19:40

## 2019-03-12 RX ADMIN — IPRATROPIUM BROMIDE AND ALBUTEROL SULFATE 1 AMPULE: .5; 3 SOLUTION RESPIRATORY (INHALATION) at 06:52

## 2019-03-12 RX ADMIN — INSULIN GLARGINE 25 UNITS: 100 INJECTION, SOLUTION SUBCUTANEOUS at 10:30

## 2019-03-12 RX ADMIN — DOXYCYCLINE 100 MG: 100 INJECTION, POWDER, LYOPHILIZED, FOR SOLUTION INTRAVENOUS at 03:08

## 2019-03-12 RX ADMIN — Medication: at 10:29

## 2019-03-12 RX ADMIN — ATORVASTATIN CALCIUM 10 MG: 10 TABLET, FILM COATED ORAL at 22:46

## 2019-03-12 RX ADMIN — IPRATROPIUM BROMIDE AND ALBUTEROL SULFATE 1 AMPULE: .5; 3 SOLUTION RESPIRATORY (INHALATION) at 23:32

## 2019-03-12 RX ADMIN — INSULIN LISPRO 15 UNITS: 100 INJECTION, SOLUTION INTRAVENOUS; SUBCUTANEOUS at 16:04

## 2019-03-12 RX ADMIN — IPRATROPIUM BROMIDE AND ALBUTEROL SULFATE 1 AMPULE: .5; 3 SOLUTION RESPIRATORY (INHALATION) at 18:30

## 2019-03-12 RX ADMIN — Medication: at 22:47

## 2019-03-12 RX ADMIN — Medication 10 ML: at 10:27

## 2019-03-12 RX ADMIN — HEPARIN SODIUM 5000 UNITS: 1000 INJECTION INTRAVENOUS; SUBCUTANEOUS at 19:46

## 2019-03-12 RX ADMIN — STANDARDIZED SENNA CONCENTRATE 8.6 MG: 8.6 TABLET ORAL at 22:46

## 2019-03-12 RX ADMIN — INSULIN LISPRO 15 UNITS: 100 INJECTION, SOLUTION INTRAVENOUS; SUBCUTANEOUS at 07:24

## 2019-03-12 RX ADMIN — LOSARTAN POTASSIUM 100 MG: 50 TABLET ORAL at 10:28

## 2019-03-12 RX ADMIN — IPRATROPIUM BROMIDE AND ALBUTEROL SULFATE 1 AMPULE: .5; 3 SOLUTION RESPIRATORY (INHALATION) at 02:59

## 2019-03-12 RX ADMIN — IPRATROPIUM BROMIDE AND ALBUTEROL SULFATE 1 AMPULE: .5; 3 SOLUTION RESPIRATORY (INHALATION) at 14:25

## 2019-03-12 RX ADMIN — DOCUSATE SODIUM 100 MG: 100 CAPSULE, LIQUID FILLED ORAL at 10:27

## 2019-03-12 RX ADMIN — LEVOTHYROXINE SODIUM 200 MCG: 50 TABLET ORAL at 06:17

## 2019-03-12 RX ADMIN — POLYETHYLENE GLYCOL 3350 17 G: 17 POWDER, FOR SOLUTION ORAL at 10:27

## 2019-03-12 RX ADMIN — DOXYCYCLINE 100 MG: 100 INJECTION, POWDER, LYOPHILIZED, FOR SOLUTION INTRAVENOUS at 16:44

## 2019-03-12 RX ADMIN — MAGESIUM CITRATE 296 ML: 1.75 LIQUID ORAL at 10:28

## 2019-03-12 RX ADMIN — DOCUSATE SODIUM 100 MG: 100 CAPSULE, LIQUID FILLED ORAL at 22:46

## 2019-03-12 RX ADMIN — CEFTRIAXONE 1 G: 1 INJECTION, POWDER, FOR SOLUTION INTRAMUSCULAR; INTRAVENOUS at 16:24

## 2019-03-12 RX ADMIN — HEPARIN SODIUM 8.84 UNITS/KG/HR: 10000 INJECTION, SOLUTION INTRAVENOUS at 12:54

## 2019-03-12 RX ADMIN — PAROXETINE HYDROCHLORIDE 20 MG: 10 TABLET, FILM COATED ORAL at 10:27

## 2019-03-12 RX ADMIN — Medication 10 ML: at 22:46

## 2019-03-12 RX ADMIN — CETIRIZINE HYDROCHLORIDE 5 MG: 5 TABLET ORAL at 10:28

## 2019-03-12 RX ADMIN — ALLOPURINOL 300 MG: 300 TABLET ORAL at 10:28

## 2019-03-12 RX ADMIN — FUROSEMIDE 40 MG: 40 TABLET ORAL at 10:27

## 2019-03-12 RX ADMIN — INSULIN LISPRO 15 UNITS: 100 INJECTION, SOLUTION INTRAVENOUS; SUBCUTANEOUS at 11:53

## 2019-03-12 ASSESSMENT — PAIN SCALES - GENERAL
PAINLEVEL_OUTOF10: 0
PAINLEVEL_OUTOF10: 0

## 2019-03-12 ASSESSMENT — PAIN SCALES - WONG BAKER: WONGBAKER_NUMERICALRESPONSE: 0

## 2019-03-12 NOTE — PROGRESS NOTES
normal bowel sounds no masses, no organomegaly  Extremities:Pedal edema 3+ unchanged  Homans sign is negative, no sign of DVT  Skin: Skin color, texture, turgor normal. No rashes or lesions  Lymphatic: bilateral lymphedema with chronic venous stasis changes  Neurologic: Neurovascularly intact without any focal sensory/motor deficits. Cranial nerves: II-XII intact, grossly non-focal.  Psychiatric: Alert and oriented, thought content appropriate, normal insight, mood appropriate      Labs:     Recent Labs     03/10/19  0406 03/11/19 0401 03/12/19 0347   WBC 9.8 7.9 11.0*   RBC 4.14* 4.18* 4.14*   HGB 11.6* 11.7* 11.7*   HCT 36.9* 38.4 38.5   MCV 89.1 91.9 93.0   MCH 28.0 28.0 28.3   MCHC 31.4* 30.5* 30.4*    252 268     Recent Labs     03/10/19  0406 03/11/19  0621 03/12/19 0347    140 142   K 4.1 4.5 4.5   ANIONGAP 13 9 9   CL 97* 96* 97*   CO2 29 35* 36*   BUN 26* 26* 27*   CREATININE 0.9 0.8 0.9   GLUCOSE 357* 352* 292*   CALCIUM 8.9 9.2 9.7     No results for input(s): MG, PHOS in the last 72 hours. No results for input(s): AST, ALT, ALB, BILITOT, ALKPHOS, ALB in the last 72 hours. @LABRCNT (ABG:3)@  HgBA1c:  No components found for: HGBA1C  FLP:    Lab Results   Component Value Date    TRIG 95 04/27/2012    HDL 55 04/27/2012    LDLCALC 83 01/14/2014    LDLDIRECT 82 04/27/2012     TSH:    Lab Results   Component Value Date    TSH 2.30 05/31/2017     Troponin T:   No results for input(s): TROPONINI in the last 72 hours. INR:   Recent Labs     03/10/19  0406 03/11/19 0401 03/12/19 0347   INR 0.97 1.29* 1.75*     DDimer positive at 0.90     RAD:   VENOUS DUPLEX  There is no evidence of deep venous thrombosis (DVT) in the bilateral   lower extremity(ies).   Note only proximal femoral vessels able to be imaged with any confidence   and no thrombus seen at this level.  The majority of veins below the upper  Jone Doris could not be imaged due to body size and habitus, therefore, this is   a limited study and could not rule out or rule in DVT. EKG: sinus     Echo:   Left ventricular size is normal .   Mild concentric left ventricular hypertrophy.  E/A flow reversal noted. Suggestive of diastolic dysfunction.   Left ventricular ejection fraction is visually estimated at 55-60%.   The right ventricle was not clearly visualized appears normal in size.   Right ventricular systolic pressure is noted at 35 mmHg which suggests   pulmonary hypertension.    ----------------------------------------------------------------   Electronically signed by Cristobal Andrew MD(Interpreting   physician) on 03/07/2019 09:04 PM    Micro: none      Impression / Plan:        Principal Problem:    Acute respiratory failure with hypoxia and hypercapnia (HCC)  Active Problems:    Hypertension    Morbid obesity with BMI of 70 and over, adult ([address]    Lymphedema of lower extremity    Ambulatory dysfunction    Hypoxia    Positive D dimer    Acute bronchitis due to other specified organisms  Resolved Problems:    * No resolved hospital problems. *    Continue oxygen, nebulizer therapy and antibiotics. DC when INR>2. She has a bed hold a [facility] and can hopefully go tomorrow.     Advance Directive: Full Code    DVT prophylaxis: heparin drip  GI: none  Antibiotics: rocephin / zithromax  Discharge planning: TBD (has a bed hold at [facility])      Petr Ying, [place]  Internal Medicine Hospitalist

## 2019-03-13 VITALS
DIASTOLIC BLOOD PRESSURE: 70 MMHG | WEIGHT: 293 LBS | HEIGHT: 67 IN | TEMPERATURE: 97.3 F | OXYGEN SATURATION: 95 % | SYSTOLIC BLOOD PRESSURE: 144 MMHG | RESPIRATION RATE: 16 BRPM | HEART RATE: 77 BPM | BODY MASS INDEX: 45.99 KG/M2

## 2019-03-13 LAB
ANION GAP SERPL CALCULATED.3IONS-SCNC: 8 MMOL/L (ref 7–19)
APTT: 114.8 SEC (ref 26–36.2)
APTT: 45.3 SEC (ref 26–36.2)
BASOPHILS ABSOLUTE: 0.1 K/UL (ref 0–0.2)
BASOPHILS RELATIVE PERCENT: 0.4 % (ref 0–1)
BUN BLDV-MCNC: 25 MG/DL (ref 8–23)
CALCIUM SERPL-MCNC: 9.4 MG/DL (ref 8.8–10.2)
CHLORIDE BLD-SCNC: 97 MMOL/L (ref 98–111)
CO2: 37 MMOL/L (ref 22–29)
CREAT SERPL-MCNC: 0.9 MG/DL (ref 0.5–0.9)
EKG P AXIS: 57 DEGREES
EKG P-R INTERVAL: 186 MS
EKG Q-T INTERVAL: 370 MS
EKG QRS DURATION: 98 MS
EKG QTC CALCULATION (BAZETT): 417 MS
EKG T AXIS: 76 DEGREES
EOSINOPHILS ABSOLUTE: 0.4 K/UL (ref 0–0.6)
EOSINOPHILS RELATIVE PERCENT: 2.9 % (ref 0–5)
GFR NON-AFRICAN AMERICAN: >60
GLUCOSE BLD-MCNC: 141 MG/DL (ref 70–99)
GLUCOSE BLD-MCNC: 149 MG/DL (ref 70–99)
GLUCOSE BLD-MCNC: 162 MG/DL (ref 74–109)
HCT VFR BLD CALC: 38.5 % (ref 37–47)
HEMOGLOBIN: 11.5 G/DL (ref 12–16)
INR BLD: 2.35 (ref 0.88–1.18)
LYMPHOCYTES ABSOLUTE: 3 K/UL (ref 1.1–4.5)
LYMPHOCYTES RELATIVE PERCENT: 23.9 % (ref 20–40)
MCH RBC QN AUTO: 28 PG (ref 27–31)
MCHC RBC AUTO-ENTMCNC: 29.9 G/DL (ref 33–37)
MCV RBC AUTO: 93.9 FL (ref 81–99)
MONOCYTES ABSOLUTE: 1.2 K/UL (ref 0–0.9)
MONOCYTES RELATIVE PERCENT: 9.8 % (ref 0–10)
NEUTROPHILS ABSOLUTE: 7.8 K/UL (ref 1.5–7.5)
NEUTROPHILS RELATIVE PERCENT: 61.2 % (ref 50–65)
PDW BLD-RTO: 15.2 % (ref 11.5–14.5)
PERFORMED ON: ABNORMAL
PERFORMED ON: ABNORMAL
PLATELET # BLD: 266 K/UL (ref 130–400)
PMV BLD AUTO: 10.8 FL (ref 9.4–12.3)
POTASSIUM SERPL-SCNC: 4.6 MMOL/L (ref 3.5–5)
PROTHROMBIN TIME: 25 SEC (ref 12–14.6)
RBC # BLD: 4.1 M/UL (ref 4.2–5.4)
SODIUM BLD-SCNC: 142 MMOL/L (ref 136–145)
WBC # BLD: 12.7 K/UL (ref 4.8–10.8)

## 2019-03-13 PROCEDURE — 2500000003 HC RX 250 WO HCPCS: Performed by: HOSPITALIST

## 2019-03-13 PROCEDURE — 6360000002 HC RX W HCPCS: Performed by: HOSPITALIST

## 2019-03-13 PROCEDURE — 6370000000 HC RX 637 (ALT 250 FOR IP): Performed by: HOSPITALIST

## 2019-03-13 PROCEDURE — 82948 REAGENT STRIP/BLOOD GLUCOSE: CPT

## 2019-03-13 PROCEDURE — 36415 COLL VENOUS BLD VENIPUNCTURE: CPT

## 2019-03-13 PROCEDURE — 85610 PROTHROMBIN TIME: CPT

## 2019-03-13 PROCEDURE — 99239 HOSP IP/OBS DSCHRG MGMT >30: CPT | Performed by: HOSPITALIST

## 2019-03-13 PROCEDURE — 2580000003 HC RX 258: Performed by: HOSPITALIST

## 2019-03-13 PROCEDURE — APPSS45 APP SPLIT SHARED TIME 31-45 MINUTES: Performed by: PHYSICIAN ASSISTANT

## 2019-03-13 PROCEDURE — 2700000000 HC OXYGEN THERAPY PER DAY

## 2019-03-13 PROCEDURE — 85730 THROMBOPLASTIN TIME PARTIAL: CPT

## 2019-03-13 PROCEDURE — 6370000000 HC RX 637 (ALT 250 FOR IP): Performed by: PHYSICIAN ASSISTANT

## 2019-03-13 PROCEDURE — 80048 BASIC METABOLIC PNL TOTAL CA: CPT

## 2019-03-13 PROCEDURE — 94640 AIRWAY INHALATION TREATMENT: CPT

## 2019-03-13 PROCEDURE — 85025 COMPLETE CBC W/AUTO DIFF WBC: CPT

## 2019-03-13 RX ORDER — BISACODYL 10 MG
10 SUPPOSITORY, RECTAL RECTAL ONCE
Status: COMPLETED | OUTPATIENT
Start: 2019-03-13 | End: 2019-03-13

## 2019-03-13 RX ORDER — CEFDINIR 300 MG/1
300 CAPSULE ORAL 2 TIMES DAILY
Qty: 8 CAPSULE | Refills: 0 | DISCHARGE
Start: 2019-03-13 | End: 2019-03-17

## 2019-03-13 RX ORDER — WARFARIN SODIUM 7.5 MG/1
7.5 TABLET ORAL DAILY
Qty: 30 TABLET | Refills: 3 | DISCHARGE
Start: 2019-03-13 | End: 2020-03-25

## 2019-03-13 RX ADMIN — HEPARIN SODIUM 9.85 UNITS/KG/HR: 10000 INJECTION, SOLUTION INTRAVENOUS at 01:02

## 2019-03-13 RX ADMIN — Medication: at 09:09

## 2019-03-13 RX ADMIN — DOXYCYCLINE 100 MG: 100 INJECTION, POWDER, LYOPHILIZED, FOR SOLUTION INTRAVENOUS at 03:38

## 2019-03-13 RX ADMIN — ALLOPURINOL 300 MG: 300 TABLET ORAL at 09:08

## 2019-03-13 RX ADMIN — DILTIAZEM HYDROCHLORIDE 180 MG: 180 CAPSULE, COATED, EXTENDED RELEASE ORAL at 09:08

## 2019-03-13 RX ADMIN — INSULIN LISPRO 15 UNITS: 100 INJECTION, SOLUTION INTRAVENOUS; SUBCUTANEOUS at 09:13

## 2019-03-13 RX ADMIN — Medication 10 ML: at 09:09

## 2019-03-13 RX ADMIN — PAROXETINE HYDROCHLORIDE 20 MG: 10 TABLET, FILM COATED ORAL at 09:08

## 2019-03-13 RX ADMIN — LOSARTAN POTASSIUM 100 MG: 50 TABLET ORAL at 09:08

## 2019-03-13 RX ADMIN — HEPARIN SODIUM 11.85 UNITS/KG/HR: 10000 INJECTION, SOLUTION INTRAVENOUS at 02:10

## 2019-03-13 RX ADMIN — FUROSEMIDE 40 MG: 40 TABLET ORAL at 09:08

## 2019-03-13 RX ADMIN — CETIRIZINE HYDROCHLORIDE 5 MG: 5 TABLET ORAL at 09:08

## 2019-03-13 RX ADMIN — INSULIN GLARGINE 25 UNITS: 100 INJECTION, SOLUTION SUBCUTANEOUS at 09:14

## 2019-03-13 RX ADMIN — DOCUSATE SODIUM 100 MG: 100 CAPSULE, LIQUID FILLED ORAL at 09:08

## 2019-03-13 RX ADMIN — BISACODYL 10 MG: 10 SUPPOSITORY RECTAL at 10:30

## 2019-03-13 RX ADMIN — POLYETHYLENE GLYCOL 3350 17 G: 17 POWDER, FOR SOLUTION ORAL at 09:08

## 2019-03-13 RX ADMIN — HEPARIN SODIUM 10000 UNITS: 1000 INJECTION INTRAVENOUS; SUBCUTANEOUS at 02:10

## 2019-03-13 RX ADMIN — ACETAMINOPHEN 650 MG: 325 TABLET ORAL at 06:46

## 2019-03-13 RX ADMIN — IPRATROPIUM BROMIDE AND ALBUTEROL SULFATE 1 AMPULE: .5; 3 SOLUTION RESPIRATORY (INHALATION) at 07:23

## 2019-03-13 RX ADMIN — LEVOTHYROXINE SODIUM 200 MCG: 50 TABLET ORAL at 06:44

## 2019-03-13 RX ADMIN — INSULIN LISPRO 15 UNITS: 100 INJECTION, SOLUTION INTRAVENOUS; SUBCUTANEOUS at 12:37

## 2019-03-13 RX ADMIN — IPRATROPIUM BROMIDE AND ALBUTEROL SULFATE 1 AMPULE: .5; 3 SOLUTION RESPIRATORY (INHALATION) at 03:05

## 2019-03-13 ASSESSMENT — PAIN SCALES - GENERAL
PAINLEVEL_OUTOF10: 4
PAINLEVEL_OUTOF10: 0

## 2019-03-13 ASSESSMENT — PAIN SCALES - WONG BAKER: WONGBAKER_NUMERICALRESPONSE: 0

## 2019-03-13 NOTE — DISCHARGE SUMMARY
03/13/19  0414    142 142   K 4.5 4.5 4.6   CL 96* 97* 97*   CO2 35* 36* 37*   BUN 26* 27* 25*   CREATININE 0.8 0.9 0.9   GLUCOSE 352* 292* 162*     INR:   Recent Labs     03/11/19  0401 03/12/19  0347 03/13/19  0414   INR 1.29* 1.75* 2.35*     Hospital Course:    Ms. Rey Herrmann is a 70year old female who presented to [facility] ED complaining of shortness of breath and wheezing that started several days prior associated with non-productive cough. She resides at a nursing home and was noted to have confusion that had worsened and was hypoxic. She is morbidly obese with BMI >70 and was transferred via EMS but due to body habitus CTA or VQ scan wasn't possible to rule out pneumonia not seen on CXR or pulmonary emboli. Venous dopplers were obtained but were also non-diagnostic due to body habitus. She was held in the ED for >24 hours in attempts to find a facility to accommodate her needs but none were found. She was admitted to Hospitalist service and placed on a heparin gtt and venturi mask to maintain saturation. It was discussed with the patient that [facility] may not be able to accommodate her needs due to her weight and should such emergency occur it could lead to death. Patient verbalized understanding and wished to be admitted to [facility] as all possibilities of finding a facility with capacity to complete her diagnostics and provide care were exhausted. Vascular surgery was consulted for elevated D-dimer who stated they were unable to perform vascular intervention and she does have risks for DVT/PE as she is sedentary and morbidly obese. They recommended to transition to Coumadin. She was also treated this admission for acute bronchitis with supplemental oxygen, solumedrol, Rocephin and Zithromax. IV Lasix was given for diuresis. Initially she required BiPAP for support and has now been weaned to NC oxygen. At this time she is stable for discharge to [facility].     Physical Exam:  Vital Signs: BP (!) 144/70   Pulse 77   Temp 97.3 °F (36.3 °C) (Temporal)   Resp 16   Ht 5' 7\" (1.702 m)   Wt (!) 506 lb 7 oz (229.7 kg)   SpO2 95%   BMI 79.32 kg/m²   General appearance:. Alert and Cooperative, morbidly obese   HEENT: Normocephalic. Chest: diminished throughout otherwise clear to auscultation bilaterally without wheezes or rhonchi. Cardiac: Normal heart tones with regular rate and rhythm, S1, S2 normal. No murmurs, gallops, or rubs auscultated. Abdomen:soft, non-tender; non-distended normal bowel sounds no masses, no organomegaly. Extremities: No clubbing or cyanosis. 2-3+ peripheral edema. Peripheral pulses palpable. Neurologic: Grossly intact. Discharge Medications:       [facility] Medication Instructions RER:[id number]    Printed on:03/13/19 1216   Medication Information                      acetaminophen (TYLENOL) 500 MG tablet  Take 500 mg by mouth every morning             acetaminophen (TYLENOL) 500 MG tablet  Take 500 mg by mouth every 4 hours as needed for Pain             albuterol sulfate  (90 Base) MCG/ACT inhaler  Inhale 2 puffs into the lungs every 6 hours as needed for Wheezing             allopurinol (ZYLOPRIM) 300 MG tablet  TAKE 1 TABLET BY MOUTH ONCE DAILY. ascorbic acid (VITAMIN C) 500 MG tablet  Take 500 mg by mouth daily             atorvastatin (LIPITOR) 10 MG tablet  TAKE 1 TABLET BY MOUTH IN THE EVENING FOR HIGH CHOLESTEROL. BD INSULIN SYRINGE ULTRAFINE 31G X 5/16\" 0.3 ML MISC  USE AS DIRECTED 3 TIMES A DAY. BD ULTRA-FINE PEN NEEDLES 29G X 12.7MM MISC  USE AS DIRECTED             Blood Glucose Monitoring Suppl (ONETOUCH VERIO SYNC SYSTEM) W/DEVICE KIT  1 Units by Does not apply route as needed             cefdinir (OMNICEF) 300 MG capsule  Take 1 capsule by mouth 2 times daily for 4 days             CRESTOR 5 MG tablet  TAKE 1 TABLET BY MOUTH ONCE DAILY.              cyanocobalamin 1000 MCG/ML injection  Inject 1 mL into

## 2019-03-13 NOTE — DISCHARGE INSTR - DIET

## 2019-03-15 ENCOUNTER — TELEPHONE (OUTPATIENT)
Dept: PRIMARY CARE CLINIC | Age: 69
End: 2019-03-15

## 2019-03-18 ENCOUNTER — TELEPHONE (OUTPATIENT)
Dept: PRIMARY CARE CLINIC | Age: 69
End: 2019-03-18

## 2019-06-25 ENCOUNTER — HOSPITAL ENCOUNTER (INPATIENT)
Age: 69
LOS: 7 days | Discharge: NURSING, REHAB OR LONG-TERM CARE | DRG: 871 | End: 2019-07-02
Attending: EMERGENCY MEDICINE | Admitting: INTERNAL MEDICINE
Payer: MEDICARE

## 2019-06-25 ENCOUNTER — APPOINTMENT (OUTPATIENT)
Dept: GENERAL RADIOLOGY | Age: 69
DRG: 871 | End: 2019-06-25
Payer: MEDICARE

## 2019-06-25 DIAGNOSIS — D72.829 LEUKOCYTOSIS, UNSPECIFIED TYPE: ICD-10-CM

## 2019-06-25 DIAGNOSIS — R50.9 FEVER IN ADULT: ICD-10-CM

## 2019-06-25 DIAGNOSIS — J18.9 PNEUMONIA DUE TO ORGANISM: Primary | ICD-10-CM

## 2019-06-25 DIAGNOSIS — E66.01 MORBID OBESITY (HCC): ICD-10-CM

## 2019-06-25 LAB
ALBUMIN SERPL-MCNC: 3.4 G/DL (ref 3.5–5.2)
ALP BLD-CCNC: 89 U/L (ref 35–104)
ALT SERPL-CCNC: 9 U/L (ref 5–33)
ANION GAP SERPL CALCULATED.3IONS-SCNC: 10 MMOL/L (ref 7–19)
AST SERPL-CCNC: 12 U/L (ref 5–32)
BACTERIA: NEGATIVE /HPF
BASE EXCESS ARTERIAL: 7.6 MMOL/L (ref -2–2)
BASOPHILS ABSOLUTE: 0.1 K/UL (ref 0–0.2)
BASOPHILS RELATIVE PERCENT: 0.2 % (ref 0–1)
BILIRUB SERPL-MCNC: 0.4 MG/DL (ref 0.2–1.2)
BILIRUBIN URINE: NEGATIVE
BLOOD, URINE: NEGATIVE
BUN BLDV-MCNC: 18 MG/DL (ref 8–23)
CALCIUM SERPL-MCNC: 9.3 MG/DL (ref 8.8–10.2)
CARBOXYHEMOGLOBIN ARTERIAL: 2.6 % (ref 0–5)
CHLORIDE BLD-SCNC: 101 MMOL/L (ref 98–111)
CLARITY: CLEAR
CO2: 28 MMOL/L (ref 22–29)
COLOR: YELLOW
CREAT SERPL-MCNC: 0.8 MG/DL (ref 0.5–0.9)
EOSINOPHILS ABSOLUTE: 0.1 K/UL (ref 0–0.6)
EOSINOPHILS RELATIVE PERCENT: 0.3 % (ref 0–5)
EPITHELIAL CELLS, UA: 4 /HPF (ref 0–5)
GFR NON-AFRICAN AMERICAN: >60
GLUCOSE BLD-MCNC: 158 MG/DL (ref 74–109)
GLUCOSE BLD-MCNC: 191 MG/DL (ref 70–99)
GLUCOSE URINE: NEGATIVE MG/DL
HCO3 ARTERIAL: 34.1 MMOL/L (ref 22–26)
HCT VFR BLD CALC: 40.2 % (ref 37–47)
HEMOGLOBIN, ART, EXTENDED: 13.2 G/DL (ref 12–16)
HEMOGLOBIN: 12.6 G/DL (ref 12–16)
HYALINE CASTS: 2 /HPF (ref 0–8)
KETONES, URINE: NEGATIVE MG/DL
LACTIC ACID: 1 MMOL/L (ref 0.5–1.9)
LEUKOCYTE ESTERASE, URINE: NEGATIVE
LIPASE: 16 U/L (ref 13–60)
LYMPHOCYTES ABSOLUTE: 0.9 K/UL (ref 1.1–4.5)
LYMPHOCYTES RELATIVE PERCENT: 3.4 % (ref 20–40)
MCH RBC QN AUTO: 28.9 PG (ref 27–31)
MCHC RBC AUTO-ENTMCNC: 31.3 G/DL (ref 33–37)
MCV RBC AUTO: 92.2 FL (ref 81–99)
METHEMOGLOBIN ARTERIAL: 1.2 %
MONOCYTES ABSOLUTE: 1.2 K/UL (ref 0–0.9)
MONOCYTES RELATIVE PERCENT: 4.5 % (ref 0–10)
NEUTROPHILS ABSOLUTE: 23.3 K/UL (ref 1.5–7.5)
NEUTROPHILS RELATIVE PERCENT: 90.7 % (ref 50–65)
NITRITE, URINE: NEGATIVE
O2 CONTENT ARTERIAL: 17.5 ML/DL
O2 SAT, ARTERIAL: 94.1 %
O2 THERAPY: ABNORMAL
PCO2 ARTERIAL: 55 MMHG (ref 35–45)
PDW BLD-RTO: 15.3 % (ref 11.5–14.5)
PERFORMED ON: ABNORMAL
PH ARTERIAL: 7.4 (ref 7.35–7.45)
PH UA: 5.5 (ref 5–8)
PLATELET # BLD: 275 K/UL (ref 130–400)
PMV BLD AUTO: 10.7 FL (ref 9.4–12.3)
PO2 ARTERIAL: 68 MMHG (ref 80–100)
POTASSIUM REFLEX MAGNESIUM: 4.2 MMOL/L (ref 3.5–5)
POTASSIUM, WHOLE BLOOD: 4.1
PROTEIN UA: 100 MG/DL
RBC # BLD: 4.36 M/UL (ref 4.2–5.4)
RBC UA: 2 /HPF (ref 0–4)
SODIUM BLD-SCNC: 139 MMOL/L (ref 136–145)
SPECIFIC GRAVITY UA: 1.01 (ref 1–1.03)
TOTAL PROTEIN: 7.1 G/DL (ref 6.6–8.7)
URINE REFLEX TO CULTURE: ABNORMAL
UROBILINOGEN, URINE: 0.2 E.U./DL
WBC # BLD: 25.7 K/UL (ref 4.8–10.8)
WBC UA: 1 /HPF (ref 0–5)

## 2019-06-25 PROCEDURE — 6370000000 HC RX 637 (ALT 250 FOR IP): Performed by: FAMILY MEDICINE

## 2019-06-25 PROCEDURE — 99222 1ST HOSP IP/OBS MODERATE 55: CPT | Performed by: FAMILY MEDICINE

## 2019-06-25 PROCEDURE — 83605 ASSAY OF LACTIC ACID: CPT

## 2019-06-25 PROCEDURE — 2580000003 HC RX 258: Performed by: EMERGENCY MEDICINE

## 2019-06-25 PROCEDURE — 96375 TX/PRO/DX INJ NEW DRUG ADDON: CPT

## 2019-06-25 PROCEDURE — 36600 WITHDRAWAL OF ARTERIAL BLOOD: CPT

## 2019-06-25 PROCEDURE — 85025 COMPLETE CBC W/AUTO DIFF WBC: CPT

## 2019-06-25 PROCEDURE — 96374 THER/PROPH/DIAG INJ IV PUSH: CPT

## 2019-06-25 PROCEDURE — 82803 BLOOD GASES ANY COMBINATION: CPT

## 2019-06-25 PROCEDURE — 87040 BLOOD CULTURE FOR BACTERIA: CPT

## 2019-06-25 PROCEDURE — 84132 ASSAY OF SERUM POTASSIUM: CPT

## 2019-06-25 PROCEDURE — 6360000002 HC RX W HCPCS: Performed by: FAMILY MEDICINE

## 2019-06-25 PROCEDURE — 99285 EMERGENCY DEPT VISIT HI MDM: CPT

## 2019-06-25 PROCEDURE — 74022 RADEX COMPL AQT ABD SERIES: CPT

## 2019-06-25 PROCEDURE — 6370000000 HC RX 637 (ALT 250 FOR IP): Performed by: EMERGENCY MEDICINE

## 2019-06-25 PROCEDURE — 80053 COMPREHEN METABOLIC PANEL: CPT

## 2019-06-25 PROCEDURE — 94640 AIRWAY INHALATION TREATMENT: CPT

## 2019-06-25 PROCEDURE — 99285 EMERGENCY DEPT VISIT HI MDM: CPT | Performed by: EMERGENCY MEDICINE

## 2019-06-25 PROCEDURE — 1210000000 HC MED SURG R&B

## 2019-06-25 PROCEDURE — 71045 X-RAY EXAM CHEST 1 VIEW: CPT

## 2019-06-25 PROCEDURE — 2700000000 HC OXYGEN THERAPY PER DAY

## 2019-06-25 PROCEDURE — 83690 ASSAY OF LIPASE: CPT

## 2019-06-25 PROCEDURE — 93005 ELECTROCARDIOGRAM TRACING: CPT

## 2019-06-25 PROCEDURE — 2500000003 HC RX 250 WO HCPCS: Performed by: FAMILY MEDICINE

## 2019-06-25 PROCEDURE — 2580000003 HC RX 258: Performed by: FAMILY MEDICINE

## 2019-06-25 PROCEDURE — 81001 URINALYSIS AUTO W/SCOPE: CPT

## 2019-06-25 PROCEDURE — 6360000002 HC RX W HCPCS: Performed by: EMERGENCY MEDICINE

## 2019-06-25 PROCEDURE — 36415 COLL VENOUS BLD VENIPUNCTURE: CPT

## 2019-06-25 RX ORDER — PAROXETINE 10 MG/1
20 TABLET, FILM COATED ORAL DAILY
Status: DISCONTINUED | OUTPATIENT
Start: 2019-06-25 | End: 2019-07-02 | Stop reason: HOSPADM

## 2019-06-25 RX ORDER — ONDANSETRON 2 MG/ML
4 INJECTION INTRAMUSCULAR; INTRAVENOUS EVERY 6 HOURS PRN
Status: DISCONTINUED | OUTPATIENT
Start: 2019-06-25 | End: 2019-07-02 | Stop reason: HOSPADM

## 2019-06-25 RX ORDER — INSULIN GLARGINE 100 [IU]/ML
60 INJECTION, SOLUTION SUBCUTANEOUS NIGHTLY
Status: DISCONTINUED | OUTPATIENT
Start: 2019-06-25 | End: 2019-07-02 | Stop reason: HOSPADM

## 2019-06-25 RX ORDER — IPRATROPIUM BROMIDE AND ALBUTEROL SULFATE 2.5; .5 MG/3ML; MG/3ML
1 SOLUTION RESPIRATORY (INHALATION)
Status: DISCONTINUED | OUTPATIENT
Start: 2019-06-25 | End: 2019-07-02 | Stop reason: HOSPADM

## 2019-06-25 RX ORDER — ATORVASTATIN CALCIUM 20 MG/1
10 TABLET, FILM COATED ORAL NIGHTLY
Status: DISCONTINUED | OUTPATIENT
Start: 2019-06-25 | End: 2019-06-28

## 2019-06-25 RX ORDER — LEVOTHYROXINE SODIUM 0.1 MG/1
200 TABLET ORAL DAILY
Status: DISCONTINUED | OUTPATIENT
Start: 2019-06-25 | End: 2019-07-02 | Stop reason: HOSPADM

## 2019-06-25 RX ORDER — ACETAMINOPHEN 650 MG/1
650 SUPPOSITORY RECTAL ONCE
Status: DISCONTINUED | OUTPATIENT
Start: 2019-06-25 | End: 2019-06-25

## 2019-06-25 RX ORDER — DOCUSATE SODIUM 100 MG/1
100 CAPSULE, LIQUID FILLED ORAL 2 TIMES DAILY
Status: DISCONTINUED | OUTPATIENT
Start: 2019-06-25 | End: 2019-07-02 | Stop reason: HOSPADM

## 2019-06-25 RX ORDER — LOSARTAN POTASSIUM 100 MG/1
100 TABLET ORAL DAILY
Status: DISCONTINUED | OUTPATIENT
Start: 2019-06-25 | End: 2019-07-02 | Stop reason: HOSPADM

## 2019-06-25 RX ORDER — SODIUM CHLORIDE 0.9 % (FLUSH) 0.9 %
10 SYRINGE (ML) INJECTION PRN
Status: DISCONTINUED | OUTPATIENT
Start: 2019-06-25 | End: 2019-07-02 | Stop reason: HOSPADM

## 2019-06-25 RX ORDER — DEXTROSE MONOHYDRATE 50 MG/ML
100 INJECTION, SOLUTION INTRAVENOUS PRN
Status: DISCONTINUED | OUTPATIENT
Start: 2019-06-25 | End: 2019-07-02 | Stop reason: HOSPADM

## 2019-06-25 RX ORDER — FERROUS SULFATE 325(65) MG
325 TABLET ORAL
Status: DISCONTINUED | OUTPATIENT
Start: 2019-06-26 | End: 2019-06-28

## 2019-06-25 RX ORDER — ALLOPURINOL 100 MG/1
300 TABLET ORAL DAILY
Status: DISCONTINUED | OUTPATIENT
Start: 2019-06-25 | End: 2019-07-02 | Stop reason: HOSPADM

## 2019-06-25 RX ORDER — NICOTINE POLACRILEX 4 MG
15 LOZENGE BUCCAL PRN
Status: DISCONTINUED | OUTPATIENT
Start: 2019-06-25 | End: 2019-07-02 | Stop reason: HOSPADM

## 2019-06-25 RX ORDER — KETOROLAC TROMETHAMINE 30 MG/ML
30 INJECTION, SOLUTION INTRAMUSCULAR; INTRAVENOUS ONCE
Status: COMPLETED | OUTPATIENT
Start: 2019-06-25 | End: 2019-06-25

## 2019-06-25 RX ORDER — NYSTATIN 10B UNIT
POWDER (EA) MISCELLANEOUS 2 TIMES DAILY
COMMUNITY

## 2019-06-25 RX ORDER — DEXTROSE MONOHYDRATE 25 G/50ML
12.5 INJECTION, SOLUTION INTRAVENOUS PRN
Status: DISCONTINUED | OUTPATIENT
Start: 2019-06-25 | End: 2019-07-02 | Stop reason: HOSPADM

## 2019-06-25 RX ORDER — ACETAMINOPHEN 325 MG/1
650 TABLET ORAL EVERY 6 HOURS PRN
Status: DISCONTINUED | OUTPATIENT
Start: 2019-06-25 | End: 2019-07-02 | Stop reason: HOSPADM

## 2019-06-25 RX ORDER — CEFEPIME HYDROCHLORIDE 2 G/50ML
2 INJECTION, SOLUTION INTRAVENOUS ONCE
Status: DISCONTINUED | OUTPATIENT
Start: 2019-06-25 | End: 2019-06-25 | Stop reason: SDUPTHER

## 2019-06-25 RX ORDER — CETIRIZINE HYDROCHLORIDE 10 MG/1
10 TABLET ORAL DAILY
Status: DISCONTINUED | OUTPATIENT
Start: 2019-06-25 | End: 2019-07-01

## 2019-06-25 RX ORDER — SODIUM CHLORIDE 0.9 % (FLUSH) 0.9 %
10 SYRINGE (ML) INJECTION EVERY 12 HOURS SCHEDULED
Status: DISCONTINUED | OUTPATIENT
Start: 2019-06-25 | End: 2019-07-02 | Stop reason: HOSPADM

## 2019-06-25 RX ORDER — FUROSEMIDE 40 MG/1
40 TABLET ORAL DAILY
Status: DISCONTINUED | OUTPATIENT
Start: 2019-06-25 | End: 2019-07-01

## 2019-06-25 RX ORDER — 0.9 % SODIUM CHLORIDE 0.9 %
1000 INTRAVENOUS SOLUTION INTRAVENOUS ONCE
Status: COMPLETED | OUTPATIENT
Start: 2019-06-25 | End: 2019-06-25

## 2019-06-25 RX ORDER — IPRATROPIUM BROMIDE AND ALBUTEROL SULFATE 2.5; .5 MG/3ML; MG/3ML
1 SOLUTION RESPIRATORY (INHALATION) ONCE
Status: COMPLETED | OUTPATIENT
Start: 2019-06-25 | End: 2019-06-25

## 2019-06-25 RX ADMIN — ENOXAPARIN SODIUM 40 MG: 40 INJECTION SUBCUTANEOUS at 23:29

## 2019-06-25 RX ADMIN — DICLOFENAC 2 G: 10 GEL TOPICAL at 23:26

## 2019-06-25 RX ADMIN — KETOROLAC TROMETHAMINE 30 MG: 30 INJECTION, SOLUTION INTRAMUSCULAR; INTRAVENOUS at 15:16

## 2019-06-25 RX ADMIN — VANCOMYCIN HYDROCHLORIDE 1500 MG: 10 INJECTION, POWDER, LYOPHILIZED, FOR SOLUTION INTRAVENOUS at 23:29

## 2019-06-25 RX ADMIN — IPRATROPIUM BROMIDE AND ALBUTEROL SULFATE 1 AMPULE: .5; 3 SOLUTION RESPIRATORY (INHALATION) at 19:10

## 2019-06-25 RX ADMIN — ATORVASTATIN CALCIUM 10 MG: 20 TABLET, FILM COATED ORAL at 23:29

## 2019-06-25 RX ADMIN — SILVER SULFADIAZINE: 10 CREAM TOPICAL at 23:38

## 2019-06-25 RX ADMIN — CEFEPIME 2 G: 2 INJECTION, POWDER, FOR SOLUTION INTRAVENOUS at 17:41

## 2019-06-25 RX ADMIN — INSULIN GLARGINE 60 UNITS: 100 INJECTION, SOLUTION SUBCUTANEOUS at 23:31

## 2019-06-25 RX ADMIN — MICONAZOLE NITRATE: 20 POWDER TOPICAL at 23:26

## 2019-06-25 RX ADMIN — INSULIN LISPRO 2 UNITS: 100 INJECTION, SOLUTION INTRAVENOUS; SUBCUTANEOUS at 23:34

## 2019-06-25 RX ADMIN — IPRATROPIUM BROMIDE AND ALBUTEROL SULFATE 1 AMPULE: .5; 3 SOLUTION RESPIRATORY (INHALATION) at 16:27

## 2019-06-25 RX ADMIN — Medication 10 ML: at 23:41

## 2019-06-25 RX ADMIN — DOCUSATE SODIUM 100 MG: 100 CAPSULE, LIQUID FILLED ORAL at 23:29

## 2019-06-25 RX ADMIN — SODIUM CHLORIDE 1000 ML: 9 INJECTION, SOLUTION INTRAVENOUS at 15:18

## 2019-06-25 ASSESSMENT — PAIN DESCRIPTION - LOCATION: LOCATION: ABDOMEN

## 2019-06-25 ASSESSMENT — ENCOUNTER SYMPTOMS
RHINORRHEA: 0
VOMITING: 0
SORE THROAT: 0
NAUSEA: 1
COUGH: 0
DIARRHEA: 0

## 2019-06-25 ASSESSMENT — PAIN SCALES - GENERAL
PAINLEVEL_OUTOF10: 5
PAINLEVEL_OUTOF10: 0
PAINLEVEL_OUTOF10: 3

## 2019-06-25 ASSESSMENT — PAIN DESCRIPTION - DESCRIPTORS: DESCRIPTORS: ACHING

## 2019-06-25 NOTE — PROGRESS NOTES
4 Eyes Skin Assessment    Sd Fatima is being assessed upon: Admission    I agree that I, Ger Marcial, along with Dasha Manriquez RN (either 2 RN's or 1 LPN and 1 RN) have performed a thorough Head to Toe Skin Assessment on the patient. ALL assessment sites listed below have been assessed. Areas assessed by both nurses:     [x]   Head, Face, and Ears   [x]   Shoulders, Back, and Chest  [x]   Arms, Elbows, and Hands   [x]   Coccyx, Sacrum, and Ischium  [x]   Legs, Feet, and Heels    Does the Patient have Skin Breakdown? Yes, wound(s) noted upon assessment. It is the responsibility of the Primary Nurse to assure that the following documentation, preventions, orders, and consults are complete on the above noted wound(s): Wound LDA initiated. LDA Flowsheet Documentation includes the Phoebe-wound, Wound Assessment, Measurements, Dressing Treatment, Drainage, and Color.     Ifeanyi Prevention initiated: Yes  Wound Care Orders initiated: Yes    [id number]  nurse consulted for Pressure Injury (Stage 3,4, Unstageable, DTI, NWPT, and Complex wounds) and New or Established Ostomies: No        Primary Nurse eSignature: Ger Marcial RN on 2019 at 6:11 PM      Co-Signer eSignature: {Esignature:[id number]}

## 2019-06-25 NOTE — PROGRESS NOTES
Liberty Alvarenga arrived to room # 414. Presented with: Fever, Shortness of breath  Mental Status: Patient is oriented, alert, coherent, logical, thought processes intact and able to concentrate and follow conversation. Vitals:    06/25/19 1742   BP: (!) 123/42   Pulse: 88   Resp: 19   Temp: 98.7 °F (37.1 °C)   SpO2: 94%     Patient safety contract and falls prevention contract reviewed with patient Yes. Oriented Patient to room. Call light within reach. Yes.   Needs, issues or concerns expressed at this time: no.      Electronically signed by Mello Garcia RN on 6/25/2019 at 6:10 PM

## 2019-06-25 NOTE — H&P
silver sulfADIAZINE (SILVADENE) 1 % cream Apply topically daily Indications: Infection Under the Skin, Apply to LLE RLE URE Q day Apply topically daily. Yes Historical Provider, MD   senna (SENOKOT) 8.6 MG tablet Take 1 tablet by mouth as needed for Constipation Indications: Constipation   Yes Historical Provider, MD   ascorbic acid (VITAMIN C) 500 MG tablet Take 500 mg by mouth daily   Yes Historical Provider, MD   hydrOXYzine (VISTARIL) 25 MG capsule Take 25 mg by mouth 3 times daily as needed for Itching   Yes Historical Provider, MD   insulin lispro (HUMALOG) 100 UNIT/ML injection vial Inject 15 Units into the skin 3 times daily (before meals)    Yes Historical Provider, MD   TRUEPLUS LANCETS 28G MISC USE AS DIRECTED 7/10/17  Yes Juvenal Brito MD   Skin Protectants, Misc. (HYDROCERIN) CREA cream Apply topically 2 times daily 6/2/17  Yes Thelma Floyd MD   ferrous sulfate (RHIANNON-KAN) 325 (65 FE) MG tablet Take 1 tablet by mouth daily (with breakfast) 6/2/17  Yes GERALDINE Hinds CNP   acetaminophen (TYLENOL) 500 MG tablet Take 500 mg by mouth every morning   Yes Historical Provider, MD   docusate sodium (COLACE) 100 MG capsule Take 1 capsule by mouth 2 times daily 5/30/17  Yes GERALDINE Babin VERIO strip TEST BLOOD SUGAR 3 TIMES A DAY. E11.9 5/24/17  Yes Juvenal Brito MD   diltiazem (CARDIZEM CD) 180 MG extended release capsule TAKE (2) CAPSULES BY MOUTH ONCE DAILY. 5/4/17  Yes Juvenal Brito MD   atorvastatin (LIPITOR) 10 MG tablet TAKE 1 TABLET BY MOUTH IN THE EVENING FOR HIGH CHOLESTEROL. 3/21/17  Yes Juvenal Brito MD   furosemide (LASIX) 40 MG tablet TAKE 1 TABLET BY MOUTH ONCE DAILY.  3/16/17  Yes GERALDINE Castellanos CNP   losartan (COZAAR) 100 MG tablet TAKE 1 TABLET IN THE MORNING FOR HIGH BLOOD PRESSURE AND TO PROTECT KIDNEYS. 2/24/17  Yes MD Francisco Javier Vazquez INSULIN SYRINGE 29G X 1/2\" 1 ML MISC USE AS DIRECTED 1/26/17  Yes Juvenal Brito MD   PARoxetine PCO2, PO2, HCO3, BE, O2SAT in the last 72 hours. INR: No results for input(s): INR in the last 72 hours. Lactic Acid:   Recent Labs     06/25/19  1425   LACTA 1.0       URINALYSIS: 100+ protein  -----------------------------------------------------------------  PA/lat CXR: basilar opacities    Acute Abd XR: possible RLL infiltrate    Assessment and Plan   Principal Problem:    HCAP (healthcare-associated pneumonia)  Active Problems: Morbid obesity with BMI of 70 and over, adult ([facility] [address]    Lymphedema of lower extremity    Type 2 diabetes mellitus with diabetic arthropathy, with long-term current use of insulin (HCC)    Acute respiratory failure with hypoxia and hypercapnia (HCC)  Resolved Problems:    * No resolved hospital problems. *    Admit to med/surg. Day #1 vancomycin and cefepime empiric therapy for healthcare-associated pneumonia to provide coverage of Gram negative and atypical organisms. Wound care consult. DuoNebs, supplemental oxygen, supportive care. Home medications reconciled.     Charlie Carcamo DO  Admitting Hospitalist Never smoker

## 2019-06-25 NOTE — ED PROVIDER NOTES
NR) 100-10 MG/5ML SYRUP    Take by mouth 4 times daily as needed for Cough. HYDROXYZINE (VISTARIL) 25 MG CAPSULE    Take 25 mg by mouth 3 times daily as needed for Itching    INSULIN GLARGINE (LANTUS) 100 UNIT/ML INJECTION VIAL    Inject 30 Units into the skin daily    INSULIN GLARGINE (LANTUS) 100 UNIT/ML INJECTION VIAL    Inject 60 Units into the skin nightly    INSULIN LISPRO (HUMALOG) 100 UNIT/ML INJECTION VIAL    Inject 15 Units into the skin 3 times daily (before meals)     LANTUS 100 UNIT/ML INJECTION VIAL    INJECT 10 UNITS IN THE MORNING AND 60 UNITS IN THE EVENING    LEVOCETIRIZINE (XYZAL) 5 MG TABLET    Take 5 mg by mouth as needed Indications: Itching of Feet     LEVOTHYROXINE (SYNTHROID) 200 MCG TABLET    TAKE 1 TABLET BY MOUTH ONCE DAILY. LEVOTHYROXINE (SYNTHROID) 200 MCG TABLET    Take 200 mcg by mouth Daily    LOSARTAN (COZAAR) 100 MG TABLET    TAKE 1 TABLET IN THE MORNING FOR HIGH BLOOD PRESSURE AND TO PROTECT KIDNEYS. MAGNESIUM HYDROXIDE (MILK OF MAGNESIA) 400 MG/5ML SUSPENSION    Take 5 mLs by mouth daily as needed for Constipation    NYSTATIN (MYCOSTATIN) 948408 UNIT/GM POWDER    Apply topically 4 times daily Indications: Breast folds Apply topically 4 times daily. NYSTATIN (MYCOSTATIN) POWD POWDER    Apply topically 2 times daily    ONDANSETRON (ZOFRAN ODT) 4 MG DISINTEGRATING TABLET    Take 1 tablet by mouth every 8 hours as needed for Nausea or Vomiting    ONETOUCH VERIO STRIP    TEST BLOOD SUGAR 3 TIMES A DAY. E11.9    PAROXETINE (PAXIL) 20 MG TABLET    TAKE 1 TABLET BY MOUTH IN THE MORNING. SENNA (SENOKOT) 8.6 MG TABLET    Take 1 tablet by mouth as needed for Constipation Indications: Constipation    SILVER SULFADIAZINE (SILVADENE) 1 % CREAM    Apply topically daily Indications: Infection Under the Skin, Apply to LLE RLE URE Q day Apply topically daily. SKIN PROTECTANTS, MISC.  (HYDROCERIN) CREA CREAM    Apply topically 2 times daily    TRUEPLUS LANCETS 28G MISC    USE AS DIRECTED    ULTICARE INSULIN SYRINGE 29G X 1/2\" 1 ML MISC    USE AS DIRECTED    ULTICARE INSULIN SYRINGE 29G X 1/2\" 1 ML MISC    USE AS DIRECTED    WARFARIN (COUMADIN) 7.5 MG TABLET    Take 1 tablet by mouth daily       ALLERGIES     Flagyl [metronidazole];  Levofloxacin; and Zithromax [azithromycin dihydrate]    FAMILY HISTORY       Family History   Problem Relation Age of Onset    Heart Disease Mother     Diabetes Father     Heart Disease Father     Cancer Maternal Aunt         breast    Heart Disease Maternal Aunt     Cancer Sister         breast          SOCIAL HISTORY       Social History     Socioeconomic History    Marital status: Single     Spouse name: Not on file    Number of children: Not on file    Years of education: Not on file    Highest education level: Not on file   Occupational History    Occupation: retired   Social Needs    Financial resource strain: Not on file    Food insecurity:     Worry: Not on file     Inability: Not on file   Follicum needs:     Medical: Not on file     Non-medical: Not on file   Tobacco Use    Smoking status: Never Smoker    Smokeless tobacco: Never Used   Substance and Sexual Activity    Alcohol use: No    Drug use: No    Sexual activity: Not on file   Lifestyle    Physical activity:     Days per week: Not on file     Minutes per session: Not on file    Stress: Not on file   Relationships    Social connections:     Talks on phone: Not on file     Gets together: Not on file     Attends Gnosticism service: Not on file     Active member of club or organization: Not on file     Attends meetings of clubs or organizations: Not on file     Relationship status: Not on file    Intimate partner violence:     Fear of current or ex partner: Not on file     Emotionally abused: Not on file     Physically abused: Not on file     Forced sexual activity: Not on file   Other Topics Concern    Not on file   Social History Narrative    Not on file

## 2019-06-26 LAB
ANION GAP SERPL CALCULATED.3IONS-SCNC: 11 MMOL/L (ref 7–19)
BASOPHILS ABSOLUTE: 0.1 K/UL (ref 0–0.2)
BASOPHILS RELATIVE PERCENT: 0.2 % (ref 0–1)
BUN BLDV-MCNC: 23 MG/DL (ref 8–23)
CALCIUM SERPL-MCNC: 9 MG/DL (ref 8.8–10.2)
CHLORIDE BLD-SCNC: 97 MMOL/L (ref 98–111)
CO2: 31 MMOL/L (ref 22–29)
CREAT SERPL-MCNC: 1.2 MG/DL (ref 0.5–0.9)
EOSINOPHILS ABSOLUTE: 0 K/UL (ref 0–0.6)
EOSINOPHILS RELATIVE PERCENT: 0 % (ref 0–5)
GFR NON-AFRICAN AMERICAN: 44
GLUCOSE BLD-MCNC: 211 MG/DL (ref 70–99)
GLUCOSE BLD-MCNC: 218 MG/DL (ref 70–99)
GLUCOSE BLD-MCNC: 223 MG/DL (ref 70–99)
GLUCOSE BLD-MCNC: 225 MG/DL (ref 74–109)
GLUCOSE BLD-MCNC: 229 MG/DL (ref 70–99)
HCT VFR BLD CALC: 36.2 % (ref 37–47)
HEMOGLOBIN: 11 G/DL (ref 12–16)
INR BLD: 1.29 (ref 0.88–1.18)
LYMPHOCYTES ABSOLUTE: 1.1 K/UL (ref 1.1–4.5)
LYMPHOCYTES RELATIVE PERCENT: 4.1 % (ref 20–40)
MCH RBC QN AUTO: 28.7 PG (ref 27–31)
MCHC RBC AUTO-ENTMCNC: 30.4 G/DL (ref 33–37)
MCV RBC AUTO: 94.5 FL (ref 81–99)
MONOCYTES ABSOLUTE: 1.2 K/UL (ref 0–0.9)
MONOCYTES RELATIVE PERCENT: 4.1 % (ref 0–10)
NEUTROPHILS ABSOLUTE: 25 K/UL (ref 1.5–7.5)
NEUTROPHILS RELATIVE PERCENT: 90.1 % (ref 50–65)
PDW BLD-RTO: 15.8 % (ref 11.5–14.5)
PERFORMED ON: ABNORMAL
PLATELET # BLD: 256 K/UL (ref 130–400)
PMV BLD AUTO: 10.8 FL (ref 9.4–12.3)
POTASSIUM REFLEX MAGNESIUM: 4.5 MMOL/L (ref 3.5–5)
PROTHROMBIN TIME: 15.4 SEC (ref 12–14.6)
RBC # BLD: 3.83 M/UL (ref 4.2–5.4)
SODIUM BLD-SCNC: 139 MMOL/L (ref 136–145)
VANCOMYCIN TROUGH: 21.5 UG/ML (ref 10–20)
WBC # BLD: 27.8 K/UL (ref 4.8–10.8)

## 2019-06-26 PROCEDURE — 6360000002 HC RX W HCPCS: Performed by: FAMILY MEDICINE

## 2019-06-26 PROCEDURE — 51702 INSERT TEMP BLADDER CATH: CPT

## 2019-06-26 PROCEDURE — 85610 PROTHROMBIN TIME: CPT

## 2019-06-26 PROCEDURE — 94640 AIRWAY INHALATION TREATMENT: CPT

## 2019-06-26 PROCEDURE — 1210000000 HC MED SURG R&B

## 2019-06-26 PROCEDURE — 92610 EVALUATE SWALLOWING FUNCTION: CPT

## 2019-06-26 PROCEDURE — 2580000003 HC RX 258: Performed by: FAMILY MEDICINE

## 2019-06-26 PROCEDURE — 80202 ASSAY OF VANCOMYCIN: CPT

## 2019-06-26 PROCEDURE — 36415 COLL VENOUS BLD VENIPUNCTURE: CPT

## 2019-06-26 PROCEDURE — 2700000000 HC OXYGEN THERAPY PER DAY

## 2019-06-26 PROCEDURE — 6370000000 HC RX 637 (ALT 250 FOR IP): Performed by: FAMILY MEDICINE

## 2019-06-26 PROCEDURE — 80048 BASIC METABOLIC PNL TOTAL CA: CPT

## 2019-06-26 PROCEDURE — 82948 REAGENT STRIP/BLOOD GLUCOSE: CPT

## 2019-06-26 PROCEDURE — 99232 SBSQ HOSP IP/OBS MODERATE 35: CPT | Performed by: INTERNAL MEDICINE

## 2019-06-26 PROCEDURE — 85025 COMPLETE CBC W/AUTO DIFF WBC: CPT

## 2019-06-26 RX ADMIN — IPRATROPIUM BROMIDE AND ALBUTEROL SULFATE 1 AMPULE: .5; 3 SOLUTION RESPIRATORY (INHALATION) at 14:30

## 2019-06-26 RX ADMIN — DICLOFENAC 2 G: 10 GEL TOPICAL at 08:44

## 2019-06-26 RX ADMIN — INSULIN LISPRO 6 UNITS: 100 INJECTION, SOLUTION INTRAVENOUS; SUBCUTANEOUS at 18:31

## 2019-06-26 RX ADMIN — Medication 2 G: at 05:23

## 2019-06-26 RX ADMIN — INSULIN LISPRO 6 UNITS: 100 INJECTION, SOLUTION INTRAVENOUS; SUBCUTANEOUS at 12:56

## 2019-06-26 RX ADMIN — Medication 10 ML: at 08:45

## 2019-06-26 RX ADMIN — DILTIAZEM HYDROCHLORIDE 360 MG: 120 CAPSULE, COATED, EXTENDED RELEASE ORAL at 08:43

## 2019-06-26 RX ADMIN — IPRATROPIUM BROMIDE AND ALBUTEROL SULFATE 1 AMPULE: .5; 3 SOLUTION RESPIRATORY (INHALATION) at 10:47

## 2019-06-26 RX ADMIN — IPRATROPIUM BROMIDE AND ALBUTEROL SULFATE 1 AMPULE: .5; 3 SOLUTION RESPIRATORY (INHALATION) at 20:46

## 2019-06-26 RX ADMIN — VANCOMYCIN HYDROCHLORIDE 1500 MG: 10 INJECTION, POWDER, LYOPHILIZED, FOR SOLUTION INTRAVENOUS at 05:23

## 2019-06-26 RX ADMIN — INSULIN LISPRO 3 UNITS: 100 INJECTION, SOLUTION INTRAVENOUS; SUBCUTANEOUS at 21:33

## 2019-06-26 RX ADMIN — MICONAZOLE NITRATE: 20 POWDER TOPICAL at 20:38

## 2019-06-26 RX ADMIN — SILVER SULFADIAZINE: 10 CREAM TOPICAL at 08:44

## 2019-06-26 RX ADMIN — INSULIN LISPRO 6 UNITS: 100 INJECTION, SOLUTION INTRAVENOUS; SUBCUTANEOUS at 08:42

## 2019-06-26 RX ADMIN — ONDANSETRON 4 MG: 2 INJECTION INTRAMUSCULAR; INTRAVENOUS at 20:38

## 2019-06-26 RX ADMIN — Medication 2 G: at 18:31

## 2019-06-26 RX ADMIN — Medication 10 ML: at 20:38

## 2019-06-26 RX ADMIN — DOCUSATE SODIUM 100 MG: 100 CAPSULE, LIQUID FILLED ORAL at 08:43

## 2019-06-26 RX ADMIN — PAROXETINE 20 MG: 10 TABLET, FILM COATED ORAL at 08:43

## 2019-06-26 RX ADMIN — CETIRIZINE HYDROCHLORIDE 10 MG: 10 TABLET, FILM COATED ORAL at 08:43

## 2019-06-26 RX ADMIN — MICONAZOLE NITRATE: 20 POWDER TOPICAL at 08:44

## 2019-06-26 RX ADMIN — ALLOPURINOL 300 MG: 100 TABLET ORAL at 08:43

## 2019-06-26 RX ADMIN — VANCOMYCIN HYDROCHLORIDE 1500 MG: 10 INJECTION, POWDER, LYOPHILIZED, FOR SOLUTION INTRAVENOUS at 12:56

## 2019-06-26 RX ADMIN — IPRATROPIUM BROMIDE AND ALBUTEROL SULFATE 1 AMPULE: .5; 3 SOLUTION RESPIRATORY (INHALATION) at 06:35

## 2019-06-26 RX ADMIN — ENOXAPARIN SODIUM 40 MG: 40 INJECTION SUBCUTANEOUS at 20:38

## 2019-06-26 RX ADMIN — LOSARTAN POTASSIUM 100 MG: 100 TABLET ORAL at 08:43

## 2019-06-26 RX ADMIN — DICLOFENAC 2 G: 10 GEL TOPICAL at 20:38

## 2019-06-26 RX ADMIN — LEVOTHYROXINE SODIUM 200 MCG: 0.1 TABLET ORAL at 05:23

## 2019-06-26 RX ADMIN — FUROSEMIDE 40 MG: 40 TABLET ORAL at 08:43

## 2019-06-26 RX ADMIN — INSULIN GLARGINE 60 UNITS: 100 INJECTION, SOLUTION SUBCUTANEOUS at 21:34

## 2019-06-26 RX ADMIN — FERROUS SULFATE TAB 325 MG (65 MG ELEMENTAL FE) 325 MG: 325 (65 FE) TAB at 08:43

## 2019-06-26 ASSESSMENT — PAIN SCALES - GENERAL: PAINLEVEL_OUTOF10: 0

## 2019-06-26 NOTE — H&P
Impression   1. Basilar opacities on the lateral view suspicious for pneumonia. Follow-up chest x-rays recommended with medical therapy to document   improvement. Signed by Dr Estee Penny on 6/25/2019 3:31 PM         Objective:   Vitals: /62   Pulse 78   Temp 98.4 °F (36.9 °C) (Temporal)   Resp 20   Ht 5' 7\" (1.702 m)   Wt (!) 510 lb (231.3 kg)   SpO2 96%   BMI 79.88 kg/m²   General appearance: No apparent distress, chronically ill appearing, morbidly obese. HEENT: Normal cephalic, atraumatic without obvious deformity. Pupils equal, round, and reactive to light. Extra ocular muscles intact. Conjunctivae/corneas clear. Neck: Supple, with full range of motion. No jugular venous distention. Trachea midline. No thyroid tenderness. No masses palpated. Respiratory:  Normal respiratory effort. Diminished bilaterally, difficult to auscultate given her body habitus. No obvious rales/Wheezes/Rhonchi. Cardiovascular: Regular rate and rhythm with normal S1/S2 without murmurs, rubs or gallops. Abdomen: Morbidly obese. Soft, non-tender, non-distended with normal bowel sounds. No hepatosplenomegaly. Musculoskeletal: Anasarca. No cyanosis. Chronic lymphedema with thickened changes. Blisters noted bilateral LE anteriorly  Skin: Skin color pale, texture, turgor poor. See wound care notes, patient did not want to roll over again. Neurologic:  Neurovascularly intact without any focal sensory/motor deficits. Cranial nerves: II-XII intact, grossly non-focal.  Generalized weakness. Psychiatric: Alert and oriented, thought content appropriate, normal insight      Assessment & Plan:       Active Hospital Problems    Diagnosis Date Noted    HCAP (healthcare-associated pneumonia) [J18.9] 06/25/2019    Acute respiratory failure with hypoxia and hypercapnia (HCC) [J96.01, J96.02]     Type 2 diabetes mellitus with diabetic arthropathy, with long-term current use of insulin (HCC) [R33.775, Z79.4]

## 2019-06-26 NOTE — PROGRESS NOTES
Pharmacy Note  Vancomycin Consult    Mg Peraza is a 78 y.o. female started on Vancomycin for HCAP; consult received from Dr. Sky Dailey to manage therapy. Also receiving the following antibiotics: Cefepime. Patient Active Problem List   Diagnosis    Hyperlipidemia    Hypertension    Hypothyroidism    Morbid obesity with BMI of 70 and over, adult ([facility] [address]    Gout    Cellulitis    Lymphedema of lower extremity    Closed displaced fracture of navicular bone of left foot    Failure to thrive in adult    Leukocytosis    Acute cystitis with hematuria    Type 2 diabetes mellitus with diabetic arthropathy, with long-term current use of insulin ([facility])    CKD (chronic kidney disease) stage 3, GFR 30-59 ml/min ([facility])    Anemia of unknown etiology    CHF (congestive heart failure) ([facility])    Ambulatory dysfunction    Hypoxia    Positive D dimer    Acute respiratory failure with hypoxia and hypercapnia ([facility])    Acute bronchitis due to other specified organisms    HCAP (healthcare-associated pneumonia)       Allergies:  Flagyl [metronidazole]; Levofloxacin; and Zithromax [azithromycin dihydrate]     Temp max: 102.4    Recent Labs     06/25/19  1429   BUN 18       Recent Labs     06/25/19  1429   CREATININE 0.8       Recent Labs     06/25/19  1429   WBC 25.7*         Intake/Output Summary (Last 24 hours) at 6/25/2019 1903  Last data filed at 6/25/2019 1821  Gross per 24 hour   Intake 10 ml   Output --   Net 10 ml       Culture Date Source Results   06/25/19 Blood x 2 No growth    Respiratory Ordered            Ht Readings from Last 1 Encounters:   06/25/19 5' 7\" (1.702 m)        Wt Readings from Last 1 Encounters:   06/25/19 (!) 510 lb (231.3 kg)         Body mass index is 79.88 kg/m². Estimated Creatinine Clearance: 134 mL/min (based on SCr of 0.8 mg/dL). Assessment/Plan:  Will initiate vancomycin 1500 mg IV every 8 hours.   Timing of trough level will be determined based on culture results, renal function, and clinical

## 2019-06-27 ENCOUNTER — APPOINTMENT (OUTPATIENT)
Dept: GENERAL RADIOLOGY | Age: 69
DRG: 871 | End: 2019-06-27
Payer: MEDICARE

## 2019-06-27 PROBLEM — R11.2 INTRACTABLE NAUSEA AND VOMITING: Status: ACTIVE | Noted: 2019-06-27

## 2019-06-27 PROBLEM — R45.851 SUICIDAL IDEATIONS: Status: ACTIVE | Noted: 2019-06-27

## 2019-06-27 LAB
ANION GAP SERPL CALCULATED.3IONS-SCNC: 13 MMOL/L (ref 7–19)
BASOPHILS ABSOLUTE: 0 K/UL (ref 0–0.2)
BASOPHILS RELATIVE PERCENT: 0.3 % (ref 0–1)
BUN BLDV-MCNC: 32 MG/DL (ref 8–23)
CALCIUM SERPL-MCNC: 9.1 MG/DL (ref 8.8–10.2)
CHLORIDE BLD-SCNC: 98 MMOL/L (ref 98–111)
CO2: 30 MMOL/L (ref 22–29)
CREAT SERPL-MCNC: 1.5 MG/DL (ref 0.5–0.9)
EOSINOPHILS ABSOLUTE: 0.2 K/UL (ref 0–0.6)
EOSINOPHILS RELATIVE PERCENT: 1.2 % (ref 0–5)
GFR NON-AFRICAN AMERICAN: 34
GLUCOSE BLD-MCNC: 216 MG/DL (ref 70–99)
GLUCOSE BLD-MCNC: 218 MG/DL (ref 70–99)
GLUCOSE BLD-MCNC: 219 MG/DL (ref 70–99)
GLUCOSE BLD-MCNC: 225 MG/DL (ref 70–99)
GLUCOSE BLD-MCNC: 232 MG/DL (ref 74–109)
HCT VFR BLD CALC: 36.4 % (ref 37–47)
HEMOGLOBIN: 10.9 G/DL (ref 12–16)
LYMPHOCYTES ABSOLUTE: 1.1 K/UL (ref 1.1–4.5)
LYMPHOCYTES RELATIVE PERCENT: 7.3 % (ref 20–40)
MCH RBC QN AUTO: 28.1 PG (ref 27–31)
MCHC RBC AUTO-ENTMCNC: 29.9 G/DL (ref 33–37)
MCV RBC AUTO: 93.8 FL (ref 81–99)
MONOCYTES ABSOLUTE: 0.9 K/UL (ref 0–0.9)
MONOCYTES RELATIVE PERCENT: 5.7 % (ref 0–10)
NEUTROPHILS ABSOLUTE: 12.6 K/UL (ref 1.5–7.5)
NEUTROPHILS RELATIVE PERCENT: 84.2 % (ref 50–65)
PDW BLD-RTO: 15.7 % (ref 11.5–14.5)
PERFORMED ON: ABNORMAL
PLATELET # BLD: 246 K/UL (ref 130–400)
PMV BLD AUTO: 10.9 FL (ref 9.4–12.3)
POTASSIUM SERPL-SCNC: 5.2 MMOL/L (ref 3.5–5)
RBC # BLD: 3.88 M/UL (ref 4.2–5.4)
SODIUM BLD-SCNC: 141 MMOL/L (ref 136–145)
WBC # BLD: 15 K/UL (ref 4.8–10.8)

## 2019-06-27 PROCEDURE — 74018 RADEX ABDOMEN 1 VIEW: CPT

## 2019-06-27 PROCEDURE — 82948 REAGENT STRIP/BLOOD GLUCOSE: CPT

## 2019-06-27 PROCEDURE — 1210000000 HC MED SURG R&B

## 2019-06-27 PROCEDURE — 2580000003 HC RX 258: Performed by: FAMILY MEDICINE

## 2019-06-27 PROCEDURE — 85025 COMPLETE CBC W/AUTO DIFF WBC: CPT

## 2019-06-27 PROCEDURE — 36415 COLL VENOUS BLD VENIPUNCTURE: CPT

## 2019-06-27 PROCEDURE — 6360000002 HC RX W HCPCS: Performed by: FAMILY MEDICINE

## 2019-06-27 PROCEDURE — 0D9670Z DRAINAGE OF STOMACH WITH DRAINAGE DEVICE, VIA NATURAL OR ARTIFICIAL OPENING: ICD-10-PCS | Performed by: INTERNAL MEDICINE

## 2019-06-27 PROCEDURE — 99233 SBSQ HOSP IP/OBS HIGH 50: CPT | Performed by: INTERNAL MEDICINE

## 2019-06-27 PROCEDURE — 2700000000 HC OXYGEN THERAPY PER DAY

## 2019-06-27 PROCEDURE — 6370000000 HC RX 637 (ALT 250 FOR IP): Performed by: INTERNAL MEDICINE

## 2019-06-27 PROCEDURE — 80048 BASIC METABOLIC PNL TOTAL CA: CPT

## 2019-06-27 PROCEDURE — 6370000000 HC RX 637 (ALT 250 FOR IP): Performed by: FAMILY MEDICINE

## 2019-06-27 PROCEDURE — 94640 AIRWAY INHALATION TREATMENT: CPT

## 2019-06-27 RX ADMIN — Medication 2 G: at 05:28

## 2019-06-27 RX ADMIN — ONDANSETRON 4 MG: 2 INJECTION INTRAMUSCULAR; INTRAVENOUS at 02:39

## 2019-06-27 RX ADMIN — MICONAZOLE NITRATE: 20 POWDER TOPICAL at 21:06

## 2019-06-27 RX ADMIN — LIDOCAINE HYDROCHLORIDE: 20 SOLUTION ORAL; TOPICAL at 01:01

## 2019-06-27 RX ADMIN — VANCOMYCIN HYDROCHLORIDE 2000 MG: 10 INJECTION, POWDER, LYOPHILIZED, FOR SOLUTION INTRAVENOUS at 01:01

## 2019-06-27 RX ADMIN — DICLOFENAC 2 G: 10 GEL TOPICAL at 21:07

## 2019-06-27 RX ADMIN — IPRATROPIUM BROMIDE AND ALBUTEROL SULFATE 1 AMPULE: .5; 3 SOLUTION RESPIRATORY (INHALATION) at 19:44

## 2019-06-27 RX ADMIN — ENOXAPARIN SODIUM 40 MG: 40 INJECTION SUBCUTANEOUS at 21:06

## 2019-06-27 RX ADMIN — IPRATROPIUM BROMIDE AND ALBUTEROL SULFATE 1 AMPULE: .5; 3 SOLUTION RESPIRATORY (INHALATION) at 14:00

## 2019-06-27 RX ADMIN — VANCOMYCIN HYDROCHLORIDE 2000 MG: 10 INJECTION, POWDER, LYOPHILIZED, FOR SOLUTION INTRAVENOUS at 14:02

## 2019-06-27 RX ADMIN — IPRATROPIUM BROMIDE AND ALBUTEROL SULFATE 1 AMPULE: .5; 3 SOLUTION RESPIRATORY (INHALATION) at 11:00

## 2019-06-27 RX ADMIN — Medication 2 G: at 18:22

## 2019-06-27 ASSESSMENT — PAIN SCALES - GENERAL
PAINLEVEL_OUTOF10: 0
PAINLEVEL_OUTOF10: 0

## 2019-06-27 NOTE — PROGRESS NOTES
HOSPITAL MEDICINE  - PROGRESS NOTE     Admit Date: 6/25/2019        Chief Complaint:  SOB, fevers, sepsis follow up       Subjective:   Patient seen and examined in bed. States her SOB is improved but not to baseline. Having horrible nausea and vomiting. Suicidal ideations last night and again this am, tells me \"if I had a knife, I would stab myself in the belly! \"  No fevers or chills. Complaints of her chronic pain but no other new complaints. Swelling and redness in her legs is at her normal as well.       ROS:  Pertinent items are noted in HPI. 14 point reveiw of systems otherwise negative.             Data:   Scheduled Meds: Reviewed  Continuous Infusions:  Infusions Meds    dextrose              Intake/Output Summary (Last 24 hours) at 6/26/2019 1539  Last data filed at 6/25/2019 1821      Gross per 24 hour   Intake 10 ml   Output --   Net 10 ml           Recent Labs     06/25/19  1429 06/26/19  0316   WBC 25.7* 27.8*   HGB 12.6 11.0*    256            Recent Labs     06/25/19  1429 06/25/19  1432 06/26/19  0316     --  139   K 4.2 4.1 4.5     --  97*   CO2 28  --  31*   BUN 18  --  23   CREATININE 0.8  --  1.2*   GLUCOSE 158*  --  225*          Recent Labs     06/25/19  1429   AST 12   ALT 9   BILITOT 0.4   ALKPHOS 89      Troponin T: No results for input(s): TROPONINI in the last 72 hours. Pro-BNP: No results for input(s): BNP in the last 72 hours. INR:       Recent Labs     06/26/19  0316   INR 1.29*      ABGs:         Lab Results   Component Value Date     PHART 7.400 06/25/2019     PO2ART 68.0 06/25/2019     PVY1LOB 55.0 06/25/2019      Narrative   History: Dyspnea   CXR: Lateral view of the chest is performed and compared to the   frontal exam obtained with the abdominal series on the same afternoon. COMPARISON: 3/20/2019   FINDINGS: There are basilar opacities on the lateral view suspicious   for pneumonia.  Right lower lobe pneumonia favored based on the AP   chest view

## 2019-06-28 LAB
ANION GAP SERPL CALCULATED.3IONS-SCNC: 8 MMOL/L (ref 7–19)
BASOPHILS ABSOLUTE: 0.1 K/UL (ref 0–0.2)
BASOPHILS RELATIVE PERCENT: 0.5 % (ref 0–1)
BUN BLDV-MCNC: 31 MG/DL (ref 8–23)
CALCIUM SERPL-MCNC: 9.1 MG/DL (ref 8.8–10.2)
CHLORIDE BLD-SCNC: 100 MMOL/L (ref 98–111)
CO2: 32 MMOL/L (ref 22–29)
CREAT SERPL-MCNC: 1.2 MG/DL (ref 0.5–0.9)
EKG P AXIS: 61 DEGREES
EKG P-R INTERVAL: 188 MS
EKG Q-T INTERVAL: 318 MS
EKG QRS DURATION: 102 MS
EKG QTC CALCULATION (BAZETT): 407 MS
EKG T AXIS: 114 DEGREES
EOSINOPHILS ABSOLUTE: 0.3 K/UL (ref 0–0.6)
EOSINOPHILS RELATIVE PERCENT: 2.6 % (ref 0–5)
GFR NON-AFRICAN AMERICAN: 44
GLUCOSE BLD-MCNC: 166 MG/DL (ref 70–99)
GLUCOSE BLD-MCNC: 170 MG/DL (ref 70–99)
GLUCOSE BLD-MCNC: 178 MG/DL (ref 70–99)
GLUCOSE BLD-MCNC: 179 MG/DL (ref 70–99)
GLUCOSE BLD-MCNC: 207 MG/DL (ref 74–109)
HCT VFR BLD CALC: 35 % (ref 37–47)
HEMOGLOBIN: 10.4 G/DL (ref 12–16)
LYMPHOCYTES ABSOLUTE: 1 K/UL (ref 1.1–4.5)
LYMPHOCYTES RELATIVE PERCENT: 8 % (ref 20–40)
MCH RBC QN AUTO: 28.3 PG (ref 27–31)
MCHC RBC AUTO-ENTMCNC: 29.7 G/DL (ref 33–37)
MCV RBC AUTO: 95.1 FL (ref 81–99)
MONOCYTES ABSOLUTE: 1.3 K/UL (ref 0–0.9)
MONOCYTES RELATIVE PERCENT: 9.8 % (ref 0–10)
NEUTROPHILS ABSOLUTE: 10.1 K/UL (ref 1.5–7.5)
NEUTROPHILS RELATIVE PERCENT: 78.3 % (ref 50–65)
PDW BLD-RTO: 15.4 % (ref 11.5–14.5)
PERFORMED ON: ABNORMAL
PLATELET # BLD: 246 K/UL (ref 130–400)
PMV BLD AUTO: 11.3 FL (ref 9.4–12.3)
POTASSIUM SERPL-SCNC: 4.5 MMOL/L (ref 3.5–5)
RBC # BLD: 3.68 M/UL (ref 4.2–5.4)
SODIUM BLD-SCNC: 140 MMOL/L (ref 136–145)
VANCOMYCIN TROUGH: 24.4 UG/ML (ref 10–20)
WBC # BLD: 12.8 K/UL (ref 4.8–10.8)

## 2019-06-28 PROCEDURE — 94640 AIRWAY INHALATION TREATMENT: CPT

## 2019-06-28 PROCEDURE — 80202 ASSAY OF VANCOMYCIN: CPT

## 2019-06-28 PROCEDURE — 6370000000 HC RX 637 (ALT 250 FOR IP): Performed by: HOSPITALIST

## 2019-06-28 PROCEDURE — 6360000002 HC RX W HCPCS: Performed by: FAMILY MEDICINE

## 2019-06-28 PROCEDURE — 85025 COMPLETE CBC W/AUTO DIFF WBC: CPT

## 2019-06-28 PROCEDURE — 2580000003 HC RX 258: Performed by: FAMILY MEDICINE

## 2019-06-28 PROCEDURE — 80048 BASIC METABOLIC PNL TOTAL CA: CPT

## 2019-06-28 PROCEDURE — 99232 SBSQ HOSP IP/OBS MODERATE 35: CPT | Performed by: HOSPITALIST

## 2019-06-28 PROCEDURE — 6370000000 HC RX 637 (ALT 250 FOR IP): Performed by: FAMILY MEDICINE

## 2019-06-28 PROCEDURE — 82948 REAGENT STRIP/BLOOD GLUCOSE: CPT

## 2019-06-28 PROCEDURE — 1210000000 HC MED SURG R&B

## 2019-06-28 PROCEDURE — 2700000000 HC OXYGEN THERAPY PER DAY

## 2019-06-28 PROCEDURE — 36415 COLL VENOUS BLD VENIPUNCTURE: CPT

## 2019-06-28 RX ORDER — POLYETHYLENE GLYCOL 3350 17 G/17G
17 POWDER, FOR SOLUTION ORAL DAILY
Status: DISCONTINUED | OUTPATIENT
Start: 2019-06-28 | End: 2019-06-28

## 2019-06-28 RX ORDER — POLYETHYLENE GLYCOL 3350 17 G/17G
17 POWDER, FOR SOLUTION ORAL DAILY
Status: DISCONTINUED | OUTPATIENT
Start: 2019-06-28 | End: 2019-07-02 | Stop reason: HOSPADM

## 2019-06-28 RX ORDER — BISACODYL 10 MG
10 SUPPOSITORY, RECTAL RECTAL DAILY
Status: DISCONTINUED | OUTPATIENT
Start: 2019-06-28 | End: 2019-07-02 | Stop reason: HOSPADM

## 2019-06-28 RX ORDER — LACTOBACILLUS RHAMNOSUS GG 10B CELL
1 CAPSULE ORAL DAILY
Status: DISCONTINUED | OUTPATIENT
Start: 2019-06-28 | End: 2019-07-02 | Stop reason: HOSPADM

## 2019-06-28 RX ORDER — HYDRALAZINE HYDROCHLORIDE 20 MG/ML
5 INJECTION INTRAMUSCULAR; INTRAVENOUS EVERY 4 HOURS PRN
Status: DISCONTINUED | OUTPATIENT
Start: 2019-06-28 | End: 2019-07-02 | Stop reason: HOSPADM

## 2019-06-28 RX ORDER — SENNA PLUS 8.6 MG/1
1 TABLET ORAL NIGHTLY
Status: DISCONTINUED | OUTPATIENT
Start: 2019-06-28 | End: 2019-07-02 | Stop reason: HOSPADM

## 2019-06-28 RX ADMIN — FUROSEMIDE 40 MG: 40 TABLET ORAL at 12:57

## 2019-06-28 RX ADMIN — IPRATROPIUM BROMIDE AND ALBUTEROL SULFATE 1 AMPULE: .5; 3 SOLUTION RESPIRATORY (INHALATION) at 19:18

## 2019-06-28 RX ADMIN — DILTIAZEM HYDROCHLORIDE 360 MG: 120 CAPSULE, COATED, EXTENDED RELEASE ORAL at 12:56

## 2019-06-28 RX ADMIN — Medication 10 MG: at 21:37

## 2019-06-28 RX ADMIN — MICONAZOLE NITRATE: 20 POWDER TOPICAL at 21:41

## 2019-06-28 RX ADMIN — Medication 2 G: at 16:44

## 2019-06-28 RX ADMIN — MICONAZOLE NITRATE: 20 POWDER TOPICAL at 12:58

## 2019-06-28 RX ADMIN — DOCUSATE SODIUM 100 MG: 100 CAPSULE, LIQUID FILLED ORAL at 12:56

## 2019-06-28 RX ADMIN — ONDANSETRON 4 MG: 2 INJECTION INTRAMUSCULAR; INTRAVENOUS at 16:44

## 2019-06-28 RX ADMIN — VANCOMYCIN HYDROCHLORIDE 2000 MG: 10 INJECTION, POWDER, LYOPHILIZED, FOR SOLUTION INTRAVENOUS at 02:53

## 2019-06-28 RX ADMIN — IPRATROPIUM BROMIDE AND ALBUTEROL SULFATE 1 AMPULE: .5; 3 SOLUTION RESPIRATORY (INHALATION) at 14:46

## 2019-06-28 RX ADMIN — ALLOPURINOL 300 MG: 100 TABLET ORAL at 12:57

## 2019-06-28 RX ADMIN — Medication 10 ML: at 21:43

## 2019-06-28 RX ADMIN — IPRATROPIUM BROMIDE AND ALBUTEROL SULFATE 1 AMPULE: .5; 3 SOLUTION RESPIRATORY (INHALATION) at 11:05

## 2019-06-28 RX ADMIN — LOSARTAN POTASSIUM 100 MG: 100 TABLET ORAL at 12:57

## 2019-06-28 RX ADMIN — IPRATROPIUM BROMIDE AND ALBUTEROL SULFATE 1 AMPULE: .5; 3 SOLUTION RESPIRATORY (INHALATION) at 07:04

## 2019-06-28 RX ADMIN — Medication 1 CAPSULE: at 12:56

## 2019-06-28 RX ADMIN — Medication 2 G: at 06:22

## 2019-06-28 RX ADMIN — ENOXAPARIN SODIUM 40 MG: 40 INJECTION SUBCUTANEOUS at 21:37

## 2019-06-28 RX ADMIN — CETIRIZINE HYDROCHLORIDE 10 MG: 10 TABLET, FILM COATED ORAL at 12:56

## 2019-06-28 RX ADMIN — VANCOMYCIN HYDROCHLORIDE 1250 MG: 10 INJECTION, POWDER, LYOPHILIZED, FOR SOLUTION INTRAVENOUS at 21:36

## 2019-06-28 RX ADMIN — PAROXETINE 20 MG: 10 TABLET, FILM COATED ORAL at 12:57

## 2019-06-28 NOTE — PLAN OF CARE
Problem: Falls - Risk of:  Goal: Will remain free from falls  Description  Will remain free from falls  Outcome: Ongoing  Goal: Absence of physical injury  Description  Absence of physical injury  Outcome: Ongoing     Problem: Risk for Impaired Skin Integrity  Goal: Tissue integrity - skin and mucous membranes  Description  Structural intactness and normal physiological function of skin and  mucous membranes.   Outcome: Ongoing     Problem: Suicide risk  Goal: Provide patient with safe environment  Description  Provide patient with safe environment  Outcome: Ongoing

## 2019-06-29 ENCOUNTER — APPOINTMENT (OUTPATIENT)
Dept: GENERAL RADIOLOGY | Age: 69
DRG: 871 | End: 2019-06-29
Payer: MEDICARE

## 2019-06-29 LAB
BASOPHILS ABSOLUTE: 0.1 K/UL (ref 0–0.2)
BASOPHILS RELATIVE PERCENT: 0.5 % (ref 0–1)
EOSINOPHILS ABSOLUTE: 0.3 K/UL (ref 0–0.6)
EOSINOPHILS RELATIVE PERCENT: 2.9 % (ref 0–5)
GLUCOSE BLD-MCNC: 189 MG/DL (ref 70–99)
GLUCOSE BLD-MCNC: 195 MG/DL (ref 70–99)
GLUCOSE BLD-MCNC: 203 MG/DL (ref 70–99)
GLUCOSE BLD-MCNC: 208 MG/DL (ref 70–99)
HCT VFR BLD CALC: 35.2 % (ref 37–47)
HEMOGLOBIN: 10.3 G/DL (ref 12–16)
LYMPHOCYTES ABSOLUTE: 1.2 K/UL (ref 1.1–4.5)
LYMPHOCYTES RELATIVE PERCENT: 11 % (ref 20–40)
MCH RBC QN AUTO: 28.1 PG (ref 27–31)
MCHC RBC AUTO-ENTMCNC: 29.3 G/DL (ref 33–37)
MCV RBC AUTO: 95.9 FL (ref 81–99)
MONOCYTES ABSOLUTE: 1.2 K/UL (ref 0–0.9)
MONOCYTES RELATIVE PERCENT: 11 % (ref 0–10)
NEUTROPHILS ABSOLUTE: 8.3 K/UL (ref 1.5–7.5)
NEUTROPHILS RELATIVE PERCENT: 73.5 % (ref 50–65)
PDW BLD-RTO: 15 % (ref 11.5–14.5)
PERFORMED ON: ABNORMAL
PLATELET # BLD: 243 K/UL (ref 130–400)
PMV BLD AUTO: 11.3 FL (ref 9.4–12.3)
RBC # BLD: 3.67 M/UL (ref 4.2–5.4)
VANCOMYCIN TROUGH: 24 UG/ML (ref 10–20)
WBC # BLD: 11.3 K/UL (ref 4.8–10.8)

## 2019-06-29 PROCEDURE — 82948 REAGENT STRIP/BLOOD GLUCOSE: CPT

## 2019-06-29 PROCEDURE — 6370000000 HC RX 637 (ALT 250 FOR IP): Performed by: FAMILY MEDICINE

## 2019-06-29 PROCEDURE — 99232 SBSQ HOSP IP/OBS MODERATE 35: CPT | Performed by: HOSPITALIST

## 2019-06-29 PROCEDURE — 36415 COLL VENOUS BLD VENIPUNCTURE: CPT

## 2019-06-29 PROCEDURE — 1210000000 HC MED SURG R&B

## 2019-06-29 PROCEDURE — 6370000000 HC RX 637 (ALT 250 FOR IP): Performed by: HOSPITALIST

## 2019-06-29 PROCEDURE — 94640 AIRWAY INHALATION TREATMENT: CPT

## 2019-06-29 PROCEDURE — 6360000002 HC RX W HCPCS: Performed by: FAMILY MEDICINE

## 2019-06-29 PROCEDURE — 2580000003 HC RX 258: Performed by: FAMILY MEDICINE

## 2019-06-29 PROCEDURE — 85025 COMPLETE CBC W/AUTO DIFF WBC: CPT

## 2019-06-29 PROCEDURE — 2700000000 HC OXYGEN THERAPY PER DAY

## 2019-06-29 PROCEDURE — 80202 ASSAY OF VANCOMYCIN: CPT

## 2019-06-29 PROCEDURE — 74018 RADEX ABDOMEN 1 VIEW: CPT

## 2019-06-29 RX ADMIN — DICLOFENAC 2 G: 10 GEL TOPICAL at 20:49

## 2019-06-29 RX ADMIN — IPRATROPIUM BROMIDE AND ALBUTEROL SULFATE 1 AMPULE: .5; 3 SOLUTION RESPIRATORY (INHALATION) at 19:47

## 2019-06-29 RX ADMIN — MICONAZOLE NITRATE: 20 POWDER TOPICAL at 20:52

## 2019-06-29 RX ADMIN — DICLOFENAC 2 G: 10 GEL TOPICAL at 12:25

## 2019-06-29 RX ADMIN — DOCUSATE SODIUM 100 MG: 100 CAPSULE, LIQUID FILLED ORAL at 21:47

## 2019-06-29 RX ADMIN — Medication 2 G: at 18:46

## 2019-06-29 RX ADMIN — ENOXAPARIN SODIUM 40 MG: 40 INJECTION SUBCUTANEOUS at 20:49

## 2019-06-29 RX ADMIN — SILVER SULFADIAZINE: 10 CREAM TOPICAL at 12:27

## 2019-06-29 RX ADMIN — LOSARTAN POTASSIUM 100 MG: 100 TABLET ORAL at 10:45

## 2019-06-29 RX ADMIN — Medication: at 10:47

## 2019-06-29 RX ADMIN — Medication 8.6 MG: at 21:47

## 2019-06-29 RX ADMIN — VANCOMYCIN HYDROCHLORIDE 1250 MG: 10 INJECTION, POWDER, LYOPHILIZED, FOR SOLUTION INTRAVENOUS at 20:48

## 2019-06-29 RX ADMIN — Medication 10 ML: at 20:49

## 2019-06-29 RX ADMIN — Medication 2 G: at 06:30

## 2019-06-29 RX ADMIN — MAGNESIUM CITRATE 296 ML: 1.75 LIQUID ORAL at 21:46

## 2019-06-29 RX ADMIN — VANCOMYCIN HYDROCHLORIDE 1250 MG: 10 INJECTION, POWDER, LYOPHILIZED, FOR SOLUTION INTRAVENOUS at 10:45

## 2019-06-29 RX ADMIN — IPRATROPIUM BROMIDE AND ALBUTEROL SULFATE 1 AMPULE: .5; 3 SOLUTION RESPIRATORY (INHALATION) at 14:20

## 2019-06-29 RX ADMIN — Medication 10 MG: at 11:00

## 2019-06-29 RX ADMIN — MICONAZOLE NITRATE: 20 POWDER TOPICAL at 12:27

## 2019-06-29 RX ADMIN — DILTIAZEM HYDROCHLORIDE 360 MG: 120 CAPSULE, COATED, EXTENDED RELEASE ORAL at 10:44

## 2019-06-29 RX ADMIN — IPRATROPIUM BROMIDE AND ALBUTEROL SULFATE 1 AMPULE: .5; 3 SOLUTION RESPIRATORY (INHALATION) at 06:33

## 2019-06-29 RX ADMIN — IPRATROPIUM BROMIDE AND ALBUTEROL SULFATE 1 AMPULE: .5; 3 SOLUTION RESPIRATORY (INHALATION) at 10:38

## 2019-06-29 RX ADMIN — Medication 1 CAPSULE: at 10:44

## 2019-06-29 RX ADMIN — PAROXETINE 20 MG: 10 TABLET, FILM COATED ORAL at 10:45

## 2019-06-29 RX ADMIN — POLYETHYLENE GLYCOL 3350 17 G: 17 POWDER, FOR SOLUTION ORAL at 11:16

## 2019-06-29 ASSESSMENT — PAIN SCALES - GENERAL: PAINLEVEL_OUTOF10: 0

## 2019-06-29 NOTE — PROGRESS NOTES
Reported to me that pt was NPO, pt stated to me that any thing she takes by mouth makes her nausated. NGT noted still to [facility]. Pt has been given ice chips for moisture. Pt does well with that.

## 2019-06-30 LAB
BASOPHILS ABSOLUTE: 0.1 K/UL (ref 0–0.2)
BASOPHILS RELATIVE PERCENT: 0.5 % (ref 0–1)
BLOOD CULTURE, ROUTINE: NORMAL
CULTURE, BLOOD 2: NORMAL
EOSINOPHILS ABSOLUTE: 0.3 K/UL (ref 0–0.6)
EOSINOPHILS RELATIVE PERCENT: 2.1 % (ref 0–5)
GLUCOSE BLD-MCNC: 212 MG/DL (ref 70–99)
GLUCOSE BLD-MCNC: 219 MG/DL (ref 70–99)
GLUCOSE BLD-MCNC: 249 MG/DL (ref 70–99)
GLUCOSE BLD-MCNC: 265 MG/DL (ref 70–99)
HCT VFR BLD CALC: 34.3 % (ref 37–47)
HEMOGLOBIN: 10.2 G/DL (ref 12–16)
LYMPHOCYTES ABSOLUTE: 1.4 K/UL (ref 1.1–4.5)
LYMPHOCYTES RELATIVE PERCENT: 10.3 % (ref 20–40)
MCH RBC QN AUTO: 27.8 PG (ref 27–31)
MCHC RBC AUTO-ENTMCNC: 29.7 G/DL (ref 33–37)
MCV RBC AUTO: 93.5 FL (ref 81–99)
MONOCYTES ABSOLUTE: 1.4 K/UL (ref 0–0.9)
MONOCYTES RELATIVE PERCENT: 10 % (ref 0–10)
NEUTROPHILS ABSOLUTE: 10.2 K/UL (ref 1.5–7.5)
NEUTROPHILS RELATIVE PERCENT: 75.3 % (ref 50–65)
PDW BLD-RTO: 14.7 % (ref 11.5–14.5)
PERFORMED ON: ABNORMAL
PLATELET # BLD: 258 K/UL (ref 130–400)
PMV BLD AUTO: 11.2 FL (ref 9.4–12.3)
RBC # BLD: 3.67 M/UL (ref 4.2–5.4)
WBC # BLD: 13.5 K/UL (ref 4.8–10.8)

## 2019-06-30 PROCEDURE — 82948 REAGENT STRIP/BLOOD GLUCOSE: CPT

## 2019-06-30 PROCEDURE — 6370000000 HC RX 637 (ALT 250 FOR IP): Performed by: FAMILY MEDICINE

## 2019-06-30 PROCEDURE — 1210000000 HC MED SURG R&B

## 2019-06-30 PROCEDURE — 2580000003 HC RX 258: Performed by: FAMILY MEDICINE

## 2019-06-30 PROCEDURE — 36415 COLL VENOUS BLD VENIPUNCTURE: CPT

## 2019-06-30 PROCEDURE — 51702 INSERT TEMP BLADDER CATH: CPT

## 2019-06-30 PROCEDURE — 85025 COMPLETE CBC W/AUTO DIFF WBC: CPT

## 2019-06-30 PROCEDURE — 99232 SBSQ HOSP IP/OBS MODERATE 35: CPT | Performed by: HOSPITALIST

## 2019-06-30 PROCEDURE — 6370000000 HC RX 637 (ALT 250 FOR IP): Performed by: HOSPITALIST

## 2019-06-30 PROCEDURE — 6360000002 HC RX W HCPCS: Performed by: FAMILY MEDICINE

## 2019-06-30 PROCEDURE — 94640 AIRWAY INHALATION TREATMENT: CPT

## 2019-06-30 PROCEDURE — 2700000000 HC OXYGEN THERAPY PER DAY

## 2019-06-30 RX ADMIN — IPRATROPIUM BROMIDE AND ALBUTEROL SULFATE 1 AMPULE: .5; 3 SOLUTION RESPIRATORY (INHALATION) at 06:11

## 2019-06-30 RX ADMIN — MICONAZOLE NITRATE: 20 POWDER TOPICAL at 20:28

## 2019-06-30 RX ADMIN — DICLOFENAC 2 G: 10 GEL TOPICAL at 16:41

## 2019-06-30 RX ADMIN — DILTIAZEM HYDROCHLORIDE 360 MG: 120 CAPSULE, COATED, EXTENDED RELEASE ORAL at 09:22

## 2019-06-30 RX ADMIN — DOCUSATE SODIUM 100 MG: 100 CAPSULE, LIQUID FILLED ORAL at 20:28

## 2019-06-30 RX ADMIN — ENOXAPARIN SODIUM 40 MG: 40 INJECTION SUBCUTANEOUS at 20:28

## 2019-06-30 RX ADMIN — ALLOPURINOL 300 MG: 100 TABLET ORAL at 09:22

## 2019-06-30 RX ADMIN — SILVER SULFADIAZINE: 10 CREAM TOPICAL at 16:40

## 2019-06-30 RX ADMIN — POLYETHYLENE GLYCOL 3350 17 G: 17 POWDER, FOR SOLUTION ORAL at 09:21

## 2019-06-30 RX ADMIN — DOCUSATE SODIUM 100 MG: 100 CAPSULE, LIQUID FILLED ORAL at 09:22

## 2019-06-30 RX ADMIN — LEVOTHYROXINE SODIUM 200 MCG: 0.1 TABLET ORAL at 06:27

## 2019-06-30 RX ADMIN — Medication 1 CAPSULE: at 09:22

## 2019-06-30 RX ADMIN — FUROSEMIDE 40 MG: 40 TABLET ORAL at 09:22

## 2019-06-30 RX ADMIN — INSULIN LISPRO 5 UNITS: 100 INJECTION, SOLUTION INTRAVENOUS; SUBCUTANEOUS at 20:33

## 2019-06-30 RX ADMIN — Medication 2 G: at 17:36

## 2019-06-30 RX ADMIN — DICLOFENAC 2 G: 10 GEL TOPICAL at 20:28

## 2019-06-30 RX ADMIN — INSULIN LISPRO 6 UNITS: 100 INJECTION, SOLUTION INTRAVENOUS; SUBCUTANEOUS at 13:08

## 2019-06-30 RX ADMIN — Medication 2 G: at 06:28

## 2019-06-30 RX ADMIN — MICONAZOLE NITRATE: 20 POWDER TOPICAL at 16:40

## 2019-06-30 RX ADMIN — IPRATROPIUM BROMIDE AND ALBUTEROL SULFATE 1 AMPULE: .5; 3 SOLUTION RESPIRATORY (INHALATION) at 19:29

## 2019-06-30 RX ADMIN — PAROXETINE 20 MG: 10 TABLET, FILM COATED ORAL at 09:21

## 2019-06-30 RX ADMIN — VANCOMYCIN HYDROCHLORIDE 1250 MG: 10 INJECTION, POWDER, LYOPHILIZED, FOR SOLUTION INTRAVENOUS at 11:00

## 2019-06-30 RX ADMIN — LOSARTAN POTASSIUM 100 MG: 100 TABLET ORAL at 09:22

## 2019-06-30 RX ADMIN — VANCOMYCIN HYDROCHLORIDE 1250 MG: 10 INJECTION, POWDER, LYOPHILIZED, FOR SOLUTION INTRAVENOUS at 22:19

## 2019-06-30 RX ADMIN — Medication 10 MG: at 09:22

## 2019-06-30 RX ADMIN — INSULIN LISPRO 6 UNITS: 100 INJECTION, SOLUTION INTRAVENOUS; SUBCUTANEOUS at 18:31

## 2019-06-30 RX ADMIN — IPRATROPIUM BROMIDE AND ALBUTEROL SULFATE 1 AMPULE: .5; 3 SOLUTION RESPIRATORY (INHALATION) at 14:15

## 2019-06-30 RX ADMIN — INSULIN GLARGINE 60 UNITS: 100 INJECTION, SOLUTION SUBCUTANEOUS at 20:33

## 2019-06-30 RX ADMIN — CETIRIZINE HYDROCHLORIDE 10 MG: 10 TABLET, FILM COATED ORAL at 09:22

## 2019-06-30 RX ADMIN — Medication 10 ML: at 20:28

## 2019-06-30 RX ADMIN — IPRATROPIUM BROMIDE AND ALBUTEROL SULFATE 1 AMPULE: .5; 3 SOLUTION RESPIRATORY (INHALATION) at 10:29

## 2019-06-30 RX ADMIN — Medication 8.6 MG: at 20:28

## 2019-06-30 NOTE — PROGRESS NOTES
Pharmacy Vancomycin Consult     Vancomycin Day: 5  Current Dosing: Vancomycin 1250 mg IV every 12 hours    Temp max:  98.9    Recent Labs     06/27/19  0408 06/28/19  0119   BUN 32* 31*       Recent Labs     06/27/19  0408 06/28/19  0119   CREATININE 1.5* 1.2*       Recent Labs     06/28/19  0119 06/29/19  0247   WBC 12.8* 11.3*         Intake/Output Summary (Last 24 hours) at 6/29/2019 2133  Last data filed at 6/29/2019 2119  Gross per 24 hour   Intake 300 ml   Output 2700 ml   Net -2400 ml       Culture Date Source Results   06/25/19 Blood x 2 No growth    Respiratory Ordered            Ht Readings from Last 1 Encounters:   06/25/19 5' 7\" (1.702 m)        Wt Readings from Last 1 Encounters:   06/25/19 (!) 510 lb (231.3 kg)         Body mass index is 79.88 kg/m². Estimated Creatinine Clearance: 89 mL/min (A) (based on SCr of 1.2 mg/dL (H)). Trough: 24.0 drawn at ~ 8 & 1/2 hours. Actual trough would have been ~ 16.3. Assessment/Plan: Level good. Resume Vancomycin 1250 mg IV every 12 hours at 2200 tonight. No additional levels at this time. Thank you for the consult. Will continue to follow.     Electronically signed by María Lebron, [address] [place] [address] on 6/29/2019 at 9:33 PM

## 2019-07-01 ENCOUNTER — APPOINTMENT (OUTPATIENT)
Dept: GENERAL RADIOLOGY | Age: 69
DRG: 871 | End: 2019-07-01
Payer: MEDICARE

## 2019-07-01 LAB
AMMONIA: 27 UMOL/L (ref 11–51)
ANION GAP SERPL CALCULATED.3IONS-SCNC: 11 MMOL/L (ref 7–19)
BASE EXCESS ARTERIAL: 10.7 MMOL/L (ref -2–2)
BASOPHILS ABSOLUTE: 0.1 K/UL (ref 0–0.2)
BASOPHILS RELATIVE PERCENT: 0.8 % (ref 0–1)
BUN BLDV-MCNC: 26 MG/DL (ref 8–23)
CALCIUM SERPL-MCNC: 9.9 MG/DL (ref 8.8–10.2)
CARBOXYHEMOGLOBIN ARTERIAL: 3.1 % (ref 0–5)
CHLORIDE BLD-SCNC: 97 MMOL/L (ref 98–111)
CO2: 33 MMOL/L (ref 22–29)
CREAT SERPL-MCNC: 1.2 MG/DL (ref 0.5–0.9)
EOSINOPHILS ABSOLUTE: 0.7 K/UL (ref 0–0.6)
EOSINOPHILS RELATIVE PERCENT: 4.7 % (ref 0–5)
GFR NON-AFRICAN AMERICAN: 44
GLUCOSE BLD-MCNC: 178 MG/DL (ref 70–99)
GLUCOSE BLD-MCNC: 206 MG/DL (ref 74–109)
GLUCOSE BLD-MCNC: 213 MG/DL (ref 70–99)
GLUCOSE BLD-MCNC: 218 MG/DL (ref 70–99)
GLUCOSE BLD-MCNC: 264 MG/DL (ref 70–99)
HCO3 ARTERIAL: 37.9 MMOL/L (ref 22–26)
HCT VFR BLD CALC: 34.2 % (ref 37–47)
HEMOGLOBIN, ART, EXTENDED: 11 G/DL (ref 12–16)
HEMOGLOBIN: 10.2 G/DL (ref 12–16)
LYMPHOCYTES ABSOLUTE: 1.6 K/UL (ref 1.1–4.5)
LYMPHOCYTES RELATIVE PERCENT: 10.9 % (ref 20–40)
MAGNESIUM: 2.7 MG/DL (ref 1.6–2.4)
MCH RBC QN AUTO: 27.9 PG (ref 27–31)
MCHC RBC AUTO-ENTMCNC: 29.8 G/DL (ref 33–37)
MCV RBC AUTO: 93.4 FL (ref 81–99)
METHEMOGLOBIN ARTERIAL: 1.3 %
MONOCYTES ABSOLUTE: 1.4 K/UL (ref 0–0.9)
MONOCYTES RELATIVE PERCENT: 9.9 % (ref 0–10)
NEUTROPHILS ABSOLUTE: 10.1 K/UL (ref 1.5–7.5)
NEUTROPHILS RELATIVE PERCENT: 71.5 % (ref 50–65)
O2 CONTENT ARTERIAL: 14.3 ML/DL
O2 SAT, ARTERIAL: 92.4 %
O2 THERAPY: ABNORMAL
PCO2 ARTERIAL: 64 MMHG (ref 35–45)
PDW BLD-RTO: 14.7 % (ref 11.5–14.5)
PERFORMED ON: ABNORMAL
PH ARTERIAL: 7.38 (ref 7.35–7.45)
PLATELET # BLD: 279 K/UL (ref 130–400)
PMV BLD AUTO: 11.1 FL (ref 9.4–12.3)
PO2 ARTERIAL: 66 MMHG (ref 80–100)
POTASSIUM SERPL-SCNC: 4.5 MMOL/L (ref 3.5–5)
POTASSIUM, WHOLE BLOOD: 4.4
PRO-BNP: 5881 PG/ML (ref 0–900)
RBC # BLD: 3.66 M/UL (ref 4.2–5.4)
SODIUM BLD-SCNC: 141 MMOL/L (ref 136–145)
TSH SERPL DL<=0.05 MIU/L-ACNC: 6.56 UIU/ML (ref 0.27–4.2)
WBC # BLD: 14.2 K/UL (ref 4.8–10.8)

## 2019-07-01 PROCEDURE — 82948 REAGENT STRIP/BLOOD GLUCOSE: CPT

## 2019-07-01 PROCEDURE — 36415 COLL VENOUS BLD VENIPUNCTURE: CPT

## 2019-07-01 PROCEDURE — 82803 BLOOD GASES ANY COMBINATION: CPT

## 2019-07-01 PROCEDURE — 94640 AIRWAY INHALATION TREATMENT: CPT

## 2019-07-01 PROCEDURE — 2580000003 HC RX 258: Performed by: FAMILY MEDICINE

## 2019-07-01 PROCEDURE — 2500000003 HC RX 250 WO HCPCS: Performed by: HOSPITALIST

## 2019-07-01 PROCEDURE — 2700000000 HC OXYGEN THERAPY PER DAY

## 2019-07-01 PROCEDURE — 6360000002 HC RX W HCPCS: Performed by: FAMILY MEDICINE

## 2019-07-01 PROCEDURE — 85025 COMPLETE CBC W/AUTO DIFF WBC: CPT

## 2019-07-01 PROCEDURE — 99221 1ST HOSP IP/OBS SF/LOW 40: CPT | Performed by: PSYCHIATRY & NEUROLOGY

## 2019-07-01 PROCEDURE — 82140 ASSAY OF AMMONIA: CPT

## 2019-07-01 PROCEDURE — 99232 SBSQ HOSP IP/OBS MODERATE 35: CPT | Performed by: HOSPITALIST

## 2019-07-01 PROCEDURE — 36600 WITHDRAWAL OF ARTERIAL BLOOD: CPT

## 2019-07-01 PROCEDURE — 6370000000 HC RX 637 (ALT 250 FOR IP): Performed by: HOSPITALIST

## 2019-07-01 PROCEDURE — 83880 ASSAY OF NATRIURETIC PEPTIDE: CPT

## 2019-07-01 PROCEDURE — 6370000000 HC RX 637 (ALT 250 FOR IP): Performed by: FAMILY MEDICINE

## 2019-07-01 PROCEDURE — 80048 BASIC METABOLIC PNL TOTAL CA: CPT

## 2019-07-01 PROCEDURE — 83735 ASSAY OF MAGNESIUM: CPT

## 2019-07-01 PROCEDURE — 84443 ASSAY THYROID STIM HORMONE: CPT

## 2019-07-01 PROCEDURE — 84132 ASSAY OF SERUM POTASSIUM: CPT

## 2019-07-01 PROCEDURE — 1210000000 HC MED SURG R&B

## 2019-07-01 PROCEDURE — 71045 X-RAY EXAM CHEST 1 VIEW: CPT

## 2019-07-01 RX ORDER — BUMETANIDE 0.25 MG/ML
1 INJECTION, SOLUTION INTRAMUSCULAR; INTRAVENOUS 2 TIMES DAILY
Status: DISCONTINUED | OUTPATIENT
Start: 2019-07-01 | End: 2019-07-02 | Stop reason: HOSPADM

## 2019-07-01 RX ORDER — LACTULOSE 10 G/15ML
20 SOLUTION ORAL DAILY
Status: DISCONTINUED | OUTPATIENT
Start: 2019-07-01 | End: 2019-07-02 | Stop reason: HOSPADM

## 2019-07-01 RX ADMIN — NYSTATIN 500000 UNITS: 100000 SUSPENSION ORAL at 20:32

## 2019-07-01 RX ADMIN — INSULIN LISPRO 6 UNITS: 100 INJECTION, SOLUTION INTRAVENOUS; SUBCUTANEOUS at 13:34

## 2019-07-01 RX ADMIN — IPRATROPIUM BROMIDE AND ALBUTEROL SULFATE 1 AMPULE: .5; 3 SOLUTION RESPIRATORY (INHALATION) at 18:48

## 2019-07-01 RX ADMIN — FUROSEMIDE 40 MG: 40 TABLET ORAL at 09:30

## 2019-07-01 RX ADMIN — IPRATROPIUM BROMIDE AND ALBUTEROL SULFATE 1 AMPULE: .5; 3 SOLUTION RESPIRATORY (INHALATION) at 15:00

## 2019-07-01 RX ADMIN — INSULIN GLARGINE 60 UNITS: 100 INJECTION, SOLUTION SUBCUTANEOUS at 21:02

## 2019-07-01 RX ADMIN — VANCOMYCIN HYDROCHLORIDE 1250 MG: 10 INJECTION, POWDER, LYOPHILIZED, FOR SOLUTION INTRAVENOUS at 20:36

## 2019-07-01 RX ADMIN — POLYETHYLENE GLYCOL 3350 17 G: 17 POWDER, FOR SOLUTION ORAL at 09:34

## 2019-07-01 RX ADMIN — Medication 1 CAPSULE: at 09:30

## 2019-07-01 RX ADMIN — ALLOPURINOL 300 MG: 100 TABLET ORAL at 09:30

## 2019-07-01 RX ADMIN — INSULIN LISPRO 6 UNITS: 100 INJECTION, SOLUTION INTRAVENOUS; SUBCUTANEOUS at 18:00

## 2019-07-01 RX ADMIN — INSULIN LISPRO 5 UNITS: 100 INJECTION, SOLUTION INTRAVENOUS; SUBCUTANEOUS at 21:04

## 2019-07-01 RX ADMIN — DICLOFENAC 2 G: 10 GEL TOPICAL at 09:31

## 2019-07-01 RX ADMIN — INSULIN LISPRO 3 UNITS: 100 INJECTION, SOLUTION INTRAVENOUS; SUBCUTANEOUS at 09:27

## 2019-07-01 RX ADMIN — IPRATROPIUM BROMIDE AND ALBUTEROL SULFATE 1 AMPULE: .5; 3 SOLUTION RESPIRATORY (INHALATION) at 10:38

## 2019-07-01 RX ADMIN — SILVER SULFADIAZINE: 10 CREAM TOPICAL at 09:31

## 2019-07-01 RX ADMIN — DILTIAZEM HYDROCHLORIDE 360 MG: 120 CAPSULE, COATED, EXTENDED RELEASE ORAL at 09:30

## 2019-07-01 RX ADMIN — ENOXAPARIN SODIUM 40 MG: 40 INJECTION SUBCUTANEOUS at 20:34

## 2019-07-01 RX ADMIN — LOSARTAN POTASSIUM 100 MG: 100 TABLET ORAL at 09:30

## 2019-07-01 RX ADMIN — Medication 2 G: at 06:04

## 2019-07-01 RX ADMIN — Medication 8.6 MG: at 20:35

## 2019-07-01 RX ADMIN — DOCUSATE SODIUM 100 MG: 100 CAPSULE, LIQUID FILLED ORAL at 20:35

## 2019-07-01 RX ADMIN — DOCUSATE SODIUM 100 MG: 100 CAPSULE, LIQUID FILLED ORAL at 09:30

## 2019-07-01 RX ADMIN — Medication 2 G: at 18:00

## 2019-07-01 RX ADMIN — PAROXETINE 20 MG: 10 TABLET, FILM COATED ORAL at 09:30

## 2019-07-01 RX ADMIN — LEVOTHYROXINE SODIUM 200 MCG: 0.1 TABLET ORAL at 06:04

## 2019-07-01 RX ADMIN — MICONAZOLE NITRATE: 20 POWDER TOPICAL at 09:30

## 2019-07-01 RX ADMIN — Medication 10 ML: at 09:34

## 2019-07-01 RX ADMIN — BUMETANIDE 1 MG: 0.25 INJECTION INTRAMUSCULAR; INTRAVENOUS at 20:33

## 2019-07-01 RX ADMIN — CETIRIZINE HYDROCHLORIDE 10 MG: 10 TABLET, FILM COATED ORAL at 09:30

## 2019-07-01 RX ADMIN — LACTULOSE 20 G: 20 SOLUTION ORAL at 18:33

## 2019-07-01 RX ADMIN — IPRATROPIUM BROMIDE AND ALBUTEROL SULFATE 1 AMPULE: .5; 3 SOLUTION RESPIRATORY (INHALATION) at 06:56

## 2019-07-01 RX ADMIN — VANCOMYCIN HYDROCHLORIDE 1250 MG: 10 INJECTION, POWDER, LYOPHILIZED, FOR SOLUTION INTRAVENOUS at 11:23

## 2019-07-01 ASSESSMENT — PAIN SCALES - GENERAL
PAINLEVEL_OUTOF10: 0
PAINLEVEL_OUTOF10: 0

## 2019-07-01 NOTE — PROGRESS NOTES
[facility] MEDICINE  - PROGRESS NOTE    Admit Date: 6/25/2019         CC: dyspnea fever     Subjective: no dyspnea or fever.   Had BM     Objective:  Mild distress    Diet: DIET CARB CONTROL; Safety Tray  Pain is:Mild  Nausea: none   Bowel Movement/Flatus no    Data:   Scheduled Meds: Reviewed  Current Facility-Administered Medications   Medication Dose Route Frequency Provider Last Rate Last Dose    vancomycin (VANCOCIN) 1,250 mg in dextrose 5 % 250 mL IVPB  1,250 mg Intravenous Q12H [place] Subramanian, DO   Stopped at 06/30/19 7409    lactobacillus (CULTURELLE) capsule 1 capsule  1 capsule Oral Daily Ame Alcazar MD   1 capsule at 06/30/19 0979    senna (SENOKOT) tablet 8.6 mg  1 tablet Oral Nightly Ame Alcazar MD   8.6 mg at 06/30/19 2028    polyethylene glycol (GLYCOLAX) packet 17 g  17 g Oral Daily Ame Alcazar MD   17 g at 06/30/19 1038    bisacodyl (DULCOLAX) suppository 10 mg  10 mg Rectal Daily Ame Alcazar MD   10 mg at 06/30/19 7420    hydrALAZINE (APRESOLINE) injection 5 mg  5 mg Intravenous Q4H PRN Ame Alcazar MD        sodium chloride flush 0.9 % injection 10 mL  10 mL Intravenous 2 times per day [facility], DO   10 mL at 06/30/19 2028    sodium chloride flush 0.9 % injection 10 mL  10 mL Intravenous PRN [facility], DO        magnesium hydroxide (MILK OF MAGNESIA) 400 MG/5ML suspension 30 mL  30 mL Oral Daily PRN [facility], DO        [facility]dansetron [facility]) injection 4 mg  4 mg Intravenous Q6H PRN [place] Subramanian, DO   4 mg at 06/28/19 1644    enoxaparin (LOVENOX) injection 40 mg  40 mg Subcutaneous Q24H [place] Subramanian, DO   40 mg at 06/30/19 2028    ipratropium-albuterol (DUONEB) nebulizer solution 1 ampule  1 ampule Inhalation Q4H [place] Subramanian, DO   1 ampule at 07/01/19 0656    acetaminophen (TYLENOL) tablet 650 mg  650 mg Oral Q6H PRN [facility], DO        ceFEPIme (MAXIPIME) 2 g in sodium chloride (PF) 20 mL IV syringe  2 g Intravenous Q12H [facility], DO   2 g at 07/01/19 0604    allopurinol (ZYLOPRIM) tablet 300 mg  300 mg Oral Daily [facility], DO   300 mg at 06/30/19 9536    diclofenac sodium 1 % gel 2 g  2 g Topical BID [facility], DO   2 g at 06/30/19 2028    diltiazem (CARDIZEM CD) extended release capsule 360 mg  360 mg Oral Daily [facility], DO   360 mg at 06/30/19 2572    docusate sodium (COLACE) capsule 100 mg  100 mg Oral BID [facility], DO   100 mg at 06/30/19 2028    furosemide (LASIX) tablet 40 mg  40 mg Oral Daily [facility], DO   40 mg at 06/30/19 1195    insulin glargine (LANTUS) injection vial 60 Units  60 Units Subcutaneous Nightly [facility], DO   60 Units at 06/30/19 2033    cetirizine (ZYRTEC) tablet 10 mg  10 mg Oral Daily [facility], DO   10 mg at 06/30/19 0338    levothyroxine (SYNTHROID) tablet 200 mcg  200 mcg Oral Daily [facility], DO   200 mcg at 07/01/19 0604    losartan (COZAAR) tablet 100 mg  100 mg Oral Daily [facility], DO   100 mg at 06/30/19 2975    miconazole (MICOTIN) 2 % powder   Topical BID [facility], DO        PARoxetine (PAXIL) tablet 20 mg  20 mg Oral Daily [facility], DO   20 mg at 06/30/19 3808    insulin lispro (HUMALOG) injection vial 0-18 Units  0-18 Units Subcutaneous TID [facility], DO   6 Units at 06/30/19 1831    insulin lispro (HUMALOG) injection vial 0-9 Units  0-9 Units Subcutaneous Nightly [facility], DO   5 Units at 06/30/19 2033    glucose (GLUTOSE) 40 % oral gel 15 g  15 g Oral PRN [facility] Subramanian, DO        dextrose 50 % IV solution  12.5 g Intravenous PRN Maldonado Nichol, DO        glucagon (rDNA) injection 1 mg  1 mg Intramuscular PRN Maldonado Gandara, DO        dextrose 5 % solution  100 mL/hr Intravenous PRN Maldonado Gandara, DO        silver sulfADIAZINE (SILVADENE) 1 % cream   Topical Daily Maldonado Gandara, DO        vancomycin [facility]) intermittent dosing (placeholder)   Other RX Placeholder [facility] Subramanian, DO         DVT Prophylaxis: Lovenox 40 mg sq daily    Continuous

## 2019-07-01 NOTE — PROGRESS NOTES
Pt bilateral legs washed with soap and water. Silver sulfadiazine applied to bilateral legs per order. Under breasts and stomach washed with soap and water. Antifungal powder and interdry applied per order. No further complaints. Sitter at bedside. Call light within reach.      Electronically signed by Charles Cisneros RN on 7/1/2019 at 11:50 AM

## 2019-07-01 NOTE — CONSULTS
in tone, volume, and quality. No slurring, dysarthria or   pressured speech noted. Mood: \"much better\"   Affect: Mood congruent. Range is full. Thought Process: Appears linear and goal oriented. Thought Content: Patient does not have any current active suicidal and   homicidal ideations. No overt delusions or paranoia appreciated. Perceptions: Seems patient does not have any auditory or visual hallucinations at present time. Patient did not appear to be responding to internal stimuli. No overt psychosis. Executive Functions: Appear mildly impaired. Concentration: Decreased  Reasoning: Appears mildly impaired at based on interaction from interview   Orientation: to person, place, and situation. Alert. Language: Intact. Fund of information: Intact. Memory: recent and remote appear intact. Impulsivity: Questionable. Neurovegitative: Decreased appetite, decreased quality of sleep. Insight: Present   Judgment: Improved. DSM 5 DIAGNOSIS:  Mood disorder, due to general medical condition (pain). Recommendations  1. Currently patient is not suicidal, homicidal or psychotic. She does not meet criteria for psychiatric hospitalization  2. Patient is competent at this time. 3. Patient does not present imminent danger to self or others. 4.  One-to-one sitter can be discontinued for psychiatric reasons, at least primary medical team has other reasons or concerns, or patient needs help with ambulation. 5.  Patient's medical issues, specifically pain issues, need to be addressed and managed properly. 6.  Patient can be discharged from the hospital when she is medically stable.     Kavitha Sykes MD

## 2019-07-01 NOTE — PROGRESS NOTES
Vancomycin Day: 7  Current Dosing: Vancomycin 1250 mg IV Q 12 hours    Temp max:  97.8    Recent Labs     07/01/19  0816   BUN 26*       Recent Labs     07/01/19  0816   CREATININE 1.2*       Recent Labs     06/30/19  0255 07/01/19  0231   WBC 13.5* 14.2*         Intake/Output Summary (Last 24 hours) at 7/1/2019 1438  Last data filed at 7/1/2019 1034  Gross per 24 hour   Intake 690 ml   Output 1100 ml   Net -410 ml        Culture Date Source Results   06/25/19 Blood x 2 No growth     Respiratory Ordered                 Ht Readings from Last 1 Encounters:   06/25/19 5' 7\" (1.702 m)        Wt Readings from Last 1 Encounters:   06/25/19 (!) 510 lb (231.3 kg)         Body mass index is 79.88 kg/m². Estimated Creatinine Clearance: 89 mL/min (A) (based on SCr of 1.2 mg/dL (H)).     Level ordered for: 07/02/19 @ 0930    Assessment/Plan:  Await level    Electronically signed by Evelio Haynes [facility] on 7/1/2019 at 2:38 PM

## 2019-07-01 NOTE — PROGRESS NOTES
HOSPITAL MEDICINE  - PROGRESS NOTE    Admit Date: 6/25/2019         CC: dyspnea fever     Subjective: some dyspnea, dry mouth, no bm, feeling weak    Objective:  Mild to moderate distress    Diet: DIET CARB CONTROL; Daily Fluid Restriction: 1500 ml; Safety Tray  Pain is:Mild  Nausea: none   Bowel Movement/Flatus no    Data:   Scheduled Meds: Reviewed  Current Facility-Administered Medications   Medication Dose Route Frequency Provider Last Rate Last Dose    bumetanide (BUMEX) injection 1 mg  1 mg Intravenous BID Everardo Levy MD        nystatin (MYCOSTATIN) 609574 UNIT/ML suspension 500,000 Units  5 mL Oral 4x Daily Everardo Levy MD        lactulose (CHRONULAC) 10 GM/15ML solution 20 g  20 g Oral Daily Everardo Levy MD   20 g at 07/01/19 1833    vancomycin (VANCOCIN) 1,250 mg in dextrose 5 % 250 mL IVPB  1,250 mg Intravenous Q12H Risa Dumont DO   Stopped at 07/01/19 1253    lactobacillus (CULTURELLE) capsule 1 capsule  1 capsule Oral Daily Everardo Levy MD   1 capsule at 07/01/19 0930    senna (SENOKOT) tablet 8.6 mg  1 tablet Oral Nightly Everardo Levy MD   8.6 mg at 06/30/19 2028    polyethylene glycol (GLYCOLAX) packet 17 g  17 g Oral Daily Everardo Levy MD   17 g at 07/01/19 0934    bisacodyl (DULCOLAX) suppository 10 mg  10 mg Rectal Daily Everardo Levy MD   10 mg at 06/30/19 4899    hydrALAZINE (APRESOLINE) injection 5 mg  5 mg Intravenous Q4H PRN Everardo Levy MD        sodium chloride flush 0.9 % injection 10 mL  10 mL Intravenous 2 times per day Risa Dumont DO   10 mL at 07/01/19 0934    sodium chloride flush 0.9 % injection 10 mL  10 mL Intravenous PRN [facility], DO        magnesium hydroxide (MILK OF MAGNESIA) 400 MG/5ML suspension 30 mL  30 mL Oral Daily PRN Risa Dumont DO        ondansetron [facility]) injection 4 mg  4 mg Intravenous Q6H PRN Rey Subramanian, DO   4 mg cream   Topical Daily Alysa Grooms, DO        vancomycin [facility]) intermittent dosing (placeholder)   Other RX Placeholder Alysa Grooms, DO         DVT Prophylaxis: Lovenox 40 mg sq daily    Continuous Infusions:   dextrose         Intake/Output Summary (Last 24 hours) at 7/1/2019 1845  Last data filed at 7/1/2019 1212  Gross per 24 hour   Intake 930 ml   Output 1000 ml   Net -70 ml     CBC:   Recent Labs     06/29/19  0247 06/30/19  0255 07/01/19  0231   WBC 11.3* 13.5* 14.2*   HGB 10.3* 10.2* 10.2*    258 279     BMP:  Recent Labs     07/01/19  0816 07/01/19  1809     --    K 4.5 4.4   CL 97*  --    CO2 33*  --    BUN 26*  --    CREATININE 1.2*  --    GLUCOSE 206*  --      ABGs:   Lab Results   Component Value Date    PHART 7.380 07/01/2019    PO2ART 66.0 07/01/2019    CXF7JAR 64.0 07/01/2019     INR:   No results for input(s): INR in the last 72 hours. Objective:   Vitals: BP (!) 140/69   Pulse 77   Temp 97.8 °F (36.6 °C) (Temporal)   Resp 20   Ht 5' 7\" (1.702 m)   Wt (!) 510 lb (231.3 kg)   SpO2 92%   BMI 79.88 kg/m²   General appearance: alert, appears stated age and cooperative  Skin: Skin color, texture, turgor normal.   HEENT: Head: Normocephalic, no lesions, without obvious abnormality.   Neck: no adenopathy, no carotid bruit, no JVD and supple, symmetrical, trachea midline  Lungs: clear to auscultation bilaterally  Heart: regular rate and rhythm, S1, S2 normal, no murmur, click, rub or gallop  Abdomen: soft, non tender bs absent    Extremities: extremities normal, atraumatic, no cyanosis or edema  Lymphatic: No significant lymph node enlargement papable  Neurologic: Mental status: Alert, oriented, thought content appropriate        Assessment & Plan:    · SBO- resolved   · Possible PNA- on ab  · SI - ruled out      Hyperlipidemia     Hypertension     Hypothyroidism     Morbid obesity with BMI of 70 and over, adult ([facility] [address]     Gout     Lymphedema of lower extremity     Leukocytosis

## 2019-07-02 VITALS
BODY MASS INDEX: 45.99 KG/M2 | OXYGEN SATURATION: 95 % | TEMPERATURE: 97.6 F | RESPIRATION RATE: 18 BRPM | WEIGHT: 293 LBS | HEIGHT: 67 IN | HEART RATE: 89 BPM | DIASTOLIC BLOOD PRESSURE: 70 MMHG | SYSTOLIC BLOOD PRESSURE: 136 MMHG

## 2019-07-02 LAB
BASOPHILS ABSOLUTE: 0.1 K/UL (ref 0–0.2)
BASOPHILS RELATIVE PERCENT: 0.7 % (ref 0–1)
EOSINOPHILS ABSOLUTE: 0.8 K/UL (ref 0–0.6)
EOSINOPHILS RELATIVE PERCENT: 6.2 % (ref 0–5)
GLUCOSE BLD-MCNC: 131 MG/DL (ref 70–99)
GLUCOSE BLD-MCNC: 153 MG/DL (ref 70–99)
GLUCOSE BLD-MCNC: 185 MG/DL (ref 70–99)
HCT VFR BLD CALC: 34.7 % (ref 37–47)
HEMOGLOBIN: 10.3 G/DL (ref 12–16)
INR BLD: 1.23 (ref 0.88–1.18)
LYMPHOCYTES ABSOLUTE: 1.6 K/UL (ref 1.1–4.5)
LYMPHOCYTES RELATIVE PERCENT: 13.4 % (ref 20–40)
MCH RBC QN AUTO: 28 PG (ref 27–31)
MCHC RBC AUTO-ENTMCNC: 29.7 G/DL (ref 33–37)
MCV RBC AUTO: 94.3 FL (ref 81–99)
MONOCYTES ABSOLUTE: 1 K/UL (ref 0–0.9)
MONOCYTES RELATIVE PERCENT: 8.2 % (ref 0–10)
NEUTROPHILS ABSOLUTE: 8.4 K/UL (ref 1.5–7.5)
NEUTROPHILS RELATIVE PERCENT: 69.2 % (ref 50–65)
PDW BLD-RTO: 14.7 % (ref 11.5–14.5)
PERFORMED ON: ABNORMAL
PLATELET # BLD: 302 K/UL (ref 130–400)
PMV BLD AUTO: 11.4 FL (ref 9.4–12.3)
PROTHROMBIN TIME: 14.9 SEC (ref 12–14.6)
RBC # BLD: 3.68 M/UL (ref 4.2–5.4)
VANCOMYCIN TROUGH: 27.3 UG/ML (ref 10–20)
WBC # BLD: 12.1 K/UL (ref 4.8–10.8)

## 2019-07-02 PROCEDURE — 80202 ASSAY OF VANCOMYCIN: CPT

## 2019-07-02 PROCEDURE — 36415 COLL VENOUS BLD VENIPUNCTURE: CPT

## 2019-07-02 PROCEDURE — 82948 REAGENT STRIP/BLOOD GLUCOSE: CPT

## 2019-07-02 PROCEDURE — 97161 PT EVAL LOW COMPLEX 20 MIN: CPT

## 2019-07-02 PROCEDURE — 85025 COMPLETE CBC W/AUTO DIFF WBC: CPT

## 2019-07-02 PROCEDURE — 99239 HOSP IP/OBS DSCHRG MGMT >30: CPT | Performed by: HOSPITALIST

## 2019-07-02 PROCEDURE — 6370000000 HC RX 637 (ALT 250 FOR IP): Performed by: HOSPITALIST

## 2019-07-02 PROCEDURE — 6360000002 HC RX W HCPCS: Performed by: FAMILY MEDICINE

## 2019-07-02 PROCEDURE — 85610 PROTHROMBIN TIME: CPT

## 2019-07-02 PROCEDURE — 97165 OT EVAL LOW COMPLEX 30 MIN: CPT

## 2019-07-02 PROCEDURE — 2500000003 HC RX 250 WO HCPCS: Performed by: HOSPITALIST

## 2019-07-02 PROCEDURE — 94640 AIRWAY INHALATION TREATMENT: CPT

## 2019-07-02 PROCEDURE — 2580000003 HC RX 258: Performed by: FAMILY MEDICINE

## 2019-07-02 PROCEDURE — 6370000000 HC RX 637 (ALT 250 FOR IP): Performed by: FAMILY MEDICINE

## 2019-07-02 PROCEDURE — 2700000000 HC OXYGEN THERAPY PER DAY

## 2019-07-02 RX ADMIN — INSULIN LISPRO 3 UNITS: 100 INJECTION, SOLUTION INTRAVENOUS; SUBCUTANEOUS at 17:55

## 2019-07-02 RX ADMIN — NYSTATIN 500000 UNITS: 100000 SUSPENSION ORAL at 09:01

## 2019-07-02 RX ADMIN — DICLOFENAC 2 G: 10 GEL TOPICAL at 09:03

## 2019-07-02 RX ADMIN — DILTIAZEM HYDROCHLORIDE 360 MG: 120 CAPSULE, COATED, EXTENDED RELEASE ORAL at 09:01

## 2019-07-02 RX ADMIN — Medication 1 CAPSULE: at 09:01

## 2019-07-02 RX ADMIN — ALLOPURINOL 300 MG: 100 TABLET ORAL at 09:01

## 2019-07-02 RX ADMIN — POLYETHYLENE GLYCOL 3350 17 G: 17 POWDER, FOR SOLUTION ORAL at 09:03

## 2019-07-02 RX ADMIN — IPRATROPIUM BROMIDE AND ALBUTEROL SULFATE 1 AMPULE: .5; 3 SOLUTION RESPIRATORY (INHALATION) at 19:44

## 2019-07-02 RX ADMIN — PAROXETINE 20 MG: 10 TABLET, FILM COATED ORAL at 09:01

## 2019-07-02 RX ADMIN — LOSARTAN POTASSIUM 100 MG: 100 TABLET ORAL at 09:01

## 2019-07-02 RX ADMIN — IPRATROPIUM BROMIDE AND ALBUTEROL SULFATE 1 AMPULE: .5; 3 SOLUTION RESPIRATORY (INHALATION) at 11:17

## 2019-07-02 RX ADMIN — Medication 10 ML: at 09:03

## 2019-07-02 RX ADMIN — NYSTATIN 500000 UNITS: 100000 SUSPENSION ORAL at 14:01

## 2019-07-02 RX ADMIN — LACTULOSE 20 G: 20 SOLUTION ORAL at 09:01

## 2019-07-02 RX ADMIN — NYSTATIN 500000 UNITS: 100000 SUSPENSION ORAL at 18:38

## 2019-07-02 RX ADMIN — MICONAZOLE NITRATE: 20 POWDER TOPICAL at 09:04

## 2019-07-02 RX ADMIN — IPRATROPIUM BROMIDE AND ALBUTEROL SULFATE 1 AMPULE: .5; 3 SOLUTION RESPIRATORY (INHALATION) at 07:41

## 2019-07-02 RX ADMIN — IPRATROPIUM BROMIDE AND ALBUTEROL SULFATE 1 AMPULE: .5; 3 SOLUTION RESPIRATORY (INHALATION) at 15:45

## 2019-07-02 RX ADMIN — SILVER SULFADIAZINE: 10 CREAM TOPICAL at 09:03

## 2019-07-02 RX ADMIN — LEVOTHYROXINE SODIUM 200 MCG: 0.1 TABLET ORAL at 06:26

## 2019-07-02 RX ADMIN — BUMETANIDE 1 MG: 0.25 INJECTION INTRAMUSCULAR; INTRAVENOUS at 09:01

## 2019-07-02 RX ADMIN — INSULIN LISPRO 3 UNITS: 100 INJECTION, SOLUTION INTRAVENOUS; SUBCUTANEOUS at 13:16

## 2019-07-02 RX ADMIN — Medication 2 G: at 06:27

## 2019-07-02 RX ADMIN — DOCUSATE SODIUM 100 MG: 100 CAPSULE, LIQUID FILLED ORAL at 09:01

## 2019-07-02 ASSESSMENT — PAIN SCALES - GENERAL
PAINLEVEL_OUTOF10: 0
PAINLEVEL_OUTOF10: 0

## 2019-07-02 NOTE — PROGRESS NOTES
Pt given a complete bed bath by [facility] LAURITA ALMODOVAR RN. Silver sulfadiazine applied to bilateral legs per order. Anitfungal powder and interdry applied under breasts and under abdominal pannus. No further complaints. Call light within reach.      Electronically signed by Lm Platt RN on 7/2/2019 at 4:44 PM

## 2019-07-02 NOTE — PROGRESS NOTES
Cognition  Overall Cognitive Status: WFL    Objective          PROM RLE (degrees)  RLE General PROM: LIMITED DUE TO BODY HABITUS  PROM LLE (degrees)  LLE General PROM: LIMITED DUE TO BODY HABITUS  Strength RLE  Comment: GROSSLY +2/5  Strength LLE  Comment: GROSSLY +2/5        Bed mobility  Rolling to Left: Moderate assistance(ASSIST TO LIFT RLE, Pt ABLE TO USE BEDRAIL AND MOMENTUM TO CARRY LEG OVER)     Ambulation  Ambulation?: No              Plan   Safety Devices  Type of devices: Call light within reach    G-Code       OutComes Score                                                  AM-PAC Score             Goals          Therapy Time   Individual Concurrent Group Co-treatment   Time In           Time Out           Minutes                   Master Callejas, PT

## 2019-07-02 NOTE — PROGRESS NOTES
Spoke with Dr. Salud Mckinley about dose change of Lovenox. Dr. Thanh Lopez the dose was correct because the pt has not been taking warfarin like she was at home.  also stated that she would get an order put in for an INR lab.      Electronically signed by Marilyn Cardoza RN on 7/2/2019 at 1:58 PM

## 2019-07-02 NOTE — PROGRESS NOTES
Occupational Therapy   Occupational Therapy Initial Assessment  Date: 2019   Patient Name: Madonna Llamas  MRN: [id number]     : 1950    Date of Service: 2019    Discharge Recommendations:  Patient would benefit from continued therapy after discharge       Assessment   Performance deficits / Impairments: Decreased functional mobility ; Decreased ADL status  Assessment: Evaluation completed. Appears to be at baseline. We will set her up with some theraband in the hospital so she can carry out a home exercise program.  Treatment Diagnosis: Pneumonia  History: SBO resolved  REQUIRES OT FOLLOW UP: Yes  Activity Tolerance  Activity Tolerance: Patient Tolerated treatment well  Safety Devices  Safety Devices in place: Yes  Type of devices: Left in bed;Call light within reach           Patient Diagnosis(es): The primary encounter diagnosis was Pneumonia due to organism. Diagnoses of Fever in adult, Leukocytosis, unspecified type, and Morbid obesity ([address] were also pertinent to this visit. has a past medical history of Cellulitis, Gout, Hyperlipidemia, Hypertension, Hypothyroidism, Lymphadenitis, Morbid obesity ([address] and Type II or unspecified type diabetes mellitus without mention of complication, not stated as uncontrolled. has a past surgical history that includes cyst removal; Chest surgery; Tonsillectomy; Cholecystectomy;  Rectal surgery; and Cataract removal.    Treatment Diagnosis: Pneumonia      Restrictions  Restrictions/Precautions  Restrictions/Precautions: Fall Risk  Position Activity Restriction  Other position/activity restrictions: SPECIALTY BED    Subjective   General  Chart Reviewed: Yes  Patient assessed for rehabilitation services?: Yes  Family / Caregiver Present: No  Patient Currently in Pain: No  Vital Signs  Temp: 97.9 °F (36.6 °C)  Temp Source: Temporal  Pulse: 77  Heart Rate Source: Monitor  Resp: 24  BP: (!) 150/67  BP Location: Left Arm  BP Upper/Lower: Lower  Patient Currently

## 2020-03-25 ENCOUNTER — OUTSIDE FACILITY SERVICE (OUTPATIENT)
Dept: PULMONOLOGY | Facility: CLINIC | Age: 70
End: 2020-03-25

## 2020-03-25 ENCOUNTER — HOSPITAL ENCOUNTER (INPATIENT)
Age: 70
LOS: 2 days | Discharge: NURSING, REHAB OR LONG-TERM CARE | DRG: 291 | End: 2020-03-27
Attending: EMERGENCY MEDICINE | Admitting: INTERNAL MEDICINE
Payer: MEDICARE

## 2020-03-25 ENCOUNTER — APPOINTMENT (OUTPATIENT)
Dept: GENERAL RADIOLOGY | Age: 70
DRG: 291 | End: 2020-03-25
Payer: MEDICARE

## 2020-03-25 PROBLEM — J96.90 RESPIRATORY FAILURE (HCC): Status: ACTIVE | Noted: 2020-03-25

## 2020-03-25 LAB
ALBUMIN SERPL-MCNC: 3.5 G/DL (ref 3.5–5.2)
ALP BLD-CCNC: 98 U/L (ref 35–104)
ALT SERPL-CCNC: <5 U/L (ref 5–33)
ANION GAP SERPL CALCULATED.3IONS-SCNC: 7 MMOL/L (ref 7–19)
APTT: 29.4 SEC (ref 26–36.2)
AST SERPL-CCNC: 8 U/L (ref 5–32)
BASE EXCESS ARTERIAL: 9.1 MMOL/L (ref -2–2)
BASE EXCESS ARTERIAL: 9.8 MMOL/L (ref -2–2)
BASOPHILS ABSOLUTE: 0.1 K/UL (ref 0–0.2)
BASOPHILS RELATIVE PERCENT: 0.5 % (ref 0–1)
BILIRUB SERPL-MCNC: <0.2 MG/DL (ref 0.2–1.2)
BUN BLDV-MCNC: 36 MG/DL (ref 8–23)
CALCIUM SERPL-MCNC: 9.9 MG/DL (ref 8.8–10.2)
CARBOXYHEMOGLOBIN ARTERIAL: 1.8 % (ref 0–5)
CARBOXYHEMOGLOBIN ARTERIAL: 2 % (ref 0–5)
CHLORIDE BLD-SCNC: 101 MMOL/L (ref 98–111)
CO2: 34 MMOL/L (ref 22–29)
CREAT SERPL-MCNC: 1.2 MG/DL (ref 0.5–0.9)
EOSINOPHILS ABSOLUTE: 0.4 K/UL (ref 0–0.6)
EOSINOPHILS RELATIVE PERCENT: 3 % (ref 0–5)
GFR NON-AFRICAN AMERICAN: 44
GLUCOSE BLD-MCNC: 110 MG/DL (ref 74–109)
GLUCOSE BLD-MCNC: 162 MG/DL (ref 70–99)
GLUCOSE BLD-MCNC: 189 MG/DL (ref 70–99)
HCO3 ARTERIAL: 40 MMOL/L (ref 22–26)
HCO3 ARTERIAL: 42.6 MMOL/L (ref 22–26)
HCT VFR BLD CALC: 38 % (ref 37–47)
HEMOGLOBIN, ART, EXTENDED: 11.1 G/DL (ref 12–16)
HEMOGLOBIN, ART, EXTENDED: 11.3 G/DL (ref 12–16)
HEMOGLOBIN: 10.8 G/DL (ref 12–16)
HYPOCHROMIA: ABNORMAL
IMMATURE GRANULOCYTES #: 0.5 K/UL
INR BLD: 1.04 (ref 0.88–1.18)
LACTIC ACID: 0.4 MMOL/L (ref 0.5–1.9)
LACTIC ACID: 0.5 MMOL/L (ref 0.5–1.9)
LV EF: 64 %
LVEF MODALITY: NORMAL
LYMPHOCYTES ABSOLUTE: 1.8 K/UL (ref 1.1–4.5)
LYMPHOCYTES RELATIVE PERCENT: 13.9 % (ref 20–40)
MCH RBC QN AUTO: 28.4 PG (ref 27–31)
MCHC RBC AUTO-ENTMCNC: 28.4 G/DL (ref 33–37)
MCV RBC AUTO: 100 FL (ref 81–99)
METHEMOGLOBIN ARTERIAL: 0.6 %
METHEMOGLOBIN ARTERIAL: 1 %
MONOCYTES ABSOLUTE: 0.9 K/UL (ref 0–0.9)
MONOCYTES RELATIVE PERCENT: 6.7 % (ref 0–10)
NEUTROPHILS ABSOLUTE: 9.4 K/UL (ref 1.5–7.5)
NEUTROPHILS RELATIVE PERCENT: 72.3 % (ref 50–65)
O2 CONTENT ARTERIAL: 13.8 ML/DL
O2 CONTENT ARTERIAL: 15.4 ML/DL
O2 SAT, ARTERIAL: 88.6 %
O2 SAT, ARTERIAL: 96.1 %
O2 THERAPY: ABNORMAL
O2 THERAPY: ABNORMAL
PCO2 ARTERIAL: 100 MMHG (ref 35–45)
PCO2 ARTERIAL: 125 MMHG (ref 35–45)
PDW BLD-RTO: 15.1 % (ref 11.5–14.5)
PERFORMED ON: ABNORMAL
PERFORMED ON: ABNORMAL
PH ARTERIAL: 7.14 (ref 7.35–7.45)
PH ARTERIAL: 7.21 (ref 7.35–7.45)
PLATELET # BLD: 279 K/UL (ref 130–400)
PLATELET SLIDE REVIEW: ADEQUATE
PMV BLD AUTO: 10.8 FL (ref 9.4–12.3)
PO2 ARTERIAL: 53 MMHG (ref 80–100)
PO2 ARTERIAL: 96 MMHG (ref 80–100)
POLYCHROMASIA: ABNORMAL
POTASSIUM SERPL-SCNC: 5.1 MMOL/L (ref 3.5–5)
POTASSIUM, WHOLE BLOOD: 4.9
POTASSIUM, WHOLE BLOOD: 5.1
PRO-BNP: 1790 PG/ML (ref 0–900)
PROTHROMBIN TIME: 13.5 SEC (ref 12–14.6)
RBC # BLD: 3.8 M/UL (ref 4.2–5.4)
SODIUM BLD-SCNC: 142 MMOL/L (ref 136–145)
TOTAL PROTEIN: 7.6 G/DL (ref 6.6–8.7)
TROPONIN: 0.01 NG/ML (ref 0–0.03)
TROPONIN: 0.02 NG/ML (ref 0–0.03)
TROPONIN: <0.01 NG/ML (ref 0–0.03)
WBC # BLD: 13 K/UL (ref 4.8–10.8)

## 2020-03-25 PROCEDURE — 99285 EMERGENCY DEPT VISIT HI MDM: CPT

## 2020-03-25 PROCEDURE — 94640 AIRWAY INHALATION TREATMENT: CPT

## 2020-03-25 PROCEDURE — 80053 COMPREHEN METABOLIC PANEL: CPT

## 2020-03-25 PROCEDURE — 6360000002 HC RX W HCPCS: Performed by: HOSPITALIST

## 2020-03-25 PROCEDURE — C8929 TTE W OR WO FOL WCON,DOPPLER: HCPCS

## 2020-03-25 PROCEDURE — 87040 BLOOD CULTURE FOR BACTERIA: CPT

## 2020-03-25 PROCEDURE — 83605 ASSAY OF LACTIC ACID: CPT

## 2020-03-25 PROCEDURE — 51702 INSERT TEMP BLADDER CATH: CPT

## 2020-03-25 PROCEDURE — 6360000004 HC RX CONTRAST MEDICATION: Performed by: NURSE PRACTITIONER

## 2020-03-25 PROCEDURE — 84132 ASSAY OF SERUM POTASSIUM: CPT

## 2020-03-25 PROCEDURE — 93970 EXTREMITY STUDY: CPT

## 2020-03-25 PROCEDURE — 85730 THROMBOPLASTIN TIME PARTIAL: CPT

## 2020-03-25 PROCEDURE — 6370000000 HC RX 637 (ALT 250 FOR IP): Performed by: HOSPITALIST

## 2020-03-25 PROCEDURE — 99223 1ST HOSP IP/OBS HIGH 75: CPT | Performed by: INTERNAL MEDICINE

## 2020-03-25 PROCEDURE — 2580000003 HC RX 258: Performed by: HOSPITALIST

## 2020-03-25 PROCEDURE — 2000000000 HC ICU R&B

## 2020-03-25 PROCEDURE — 36415 COLL VENOUS BLD VENIPUNCTURE: CPT

## 2020-03-25 PROCEDURE — 6370000000 HC RX 637 (ALT 250 FOR IP): Performed by: PHYSICIAN ASSISTANT

## 2020-03-25 PROCEDURE — 99291 CRITICAL CARE FIRST HOUR: CPT | Performed by: INTERNAL MEDICINE

## 2020-03-25 PROCEDURE — 2500000003 HC RX 250 WO HCPCS: Performed by: HOSPITALIST

## 2020-03-25 PROCEDURE — 83880 ASSAY OF NATRIURETIC PEPTIDE: CPT

## 2020-03-25 PROCEDURE — 71045 X-RAY EXAM CHEST 1 VIEW: CPT

## 2020-03-25 PROCEDURE — 93005 ELECTROCARDIOGRAM TRACING: CPT | Performed by: PHYSICIAN ASSISTANT

## 2020-03-25 PROCEDURE — 85610 PROTHROMBIN TIME: CPT

## 2020-03-25 PROCEDURE — 82803 BLOOD GASES ANY COMBINATION: CPT

## 2020-03-25 PROCEDURE — 84484 ASSAY OF TROPONIN QUANT: CPT

## 2020-03-25 PROCEDURE — 82947 ASSAY GLUCOSE BLOOD QUANT: CPT

## 2020-03-25 PROCEDURE — 6360000002 HC RX W HCPCS: Performed by: PHYSICIAN ASSISTANT

## 2020-03-25 PROCEDURE — 94660 CPAP INITIATION&MGMT: CPT

## 2020-03-25 PROCEDURE — 36600 WITHDRAWAL OF ARTERIAL BLOOD: CPT

## 2020-03-25 PROCEDURE — 6360000002 HC RX W HCPCS: Performed by: EMERGENCY MEDICINE

## 2020-03-25 PROCEDURE — 85025 COMPLETE CBC W/AUTO DIFF WBC: CPT

## 2020-03-25 PROCEDURE — 2580000003 HC RX 258: Performed by: EMERGENCY MEDICINE

## 2020-03-25 PROCEDURE — 96374 THER/PROPH/DIAG INJ IV PUSH: CPT

## 2020-03-25 PROCEDURE — 2700000000 HC OXYGEN THERAPY PER DAY

## 2020-03-25 PROCEDURE — 96375 TX/PRO/DX INJ NEW DRUG ADDON: CPT

## 2020-03-25 RX ORDER — HEPARIN SODIUM 5000 [USP'U]/ML
5000 INJECTION, SOLUTION INTRAVENOUS; SUBCUTANEOUS EVERY 8 HOURS SCHEDULED
Status: DISCONTINUED | OUTPATIENT
Start: 2020-03-25 | End: 2020-03-27 | Stop reason: HOSPADM

## 2020-03-25 RX ORDER — SODIUM CHLORIDE 0.9 % (FLUSH) 0.9 %
10 SYRINGE (ML) INJECTION PRN
Status: DISCONTINUED | OUTPATIENT
Start: 2020-03-25 | End: 2020-03-27 | Stop reason: HOSPADM

## 2020-03-25 RX ORDER — PAROXETINE HYDROCHLORIDE 20 MG/1
20 TABLET, FILM COATED ORAL DAILY
Status: DISCONTINUED | OUTPATIENT
Start: 2020-03-25 | End: 2020-03-27 | Stop reason: HOSPADM

## 2020-03-25 RX ORDER — IPRATROPIUM BROMIDE AND ALBUTEROL SULFATE 2.5; .5 MG/3ML; MG/3ML
1 SOLUTION RESPIRATORY (INHALATION) ONCE
Status: COMPLETED | OUTPATIENT
Start: 2020-03-25 | End: 2020-03-25

## 2020-03-25 RX ORDER — DEXTROSE MONOHYDRATE 50 MG/ML
100 INJECTION, SOLUTION INTRAVENOUS PRN
Status: DISCONTINUED | OUTPATIENT
Start: 2020-03-25 | End: 2020-03-27 | Stop reason: HOSPADM

## 2020-03-25 RX ORDER — LOSARTAN POTASSIUM 100 MG/1
100 TABLET ORAL DAILY
Status: DISCONTINUED | OUTPATIENT
Start: 2020-03-25 | End: 2020-03-27 | Stop reason: HOSPADM

## 2020-03-25 RX ORDER — FUROSEMIDE 10 MG/ML
40 INJECTION INTRAMUSCULAR; INTRAVENOUS ONCE
Status: COMPLETED | OUTPATIENT
Start: 2020-03-25 | End: 2020-03-25

## 2020-03-25 RX ORDER — ACETAMINOPHEN 325 MG/1
650 TABLET ORAL EVERY 6 HOURS PRN
Status: DISCONTINUED | OUTPATIENT
Start: 2020-03-25 | End: 2020-03-27 | Stop reason: HOSPADM

## 2020-03-25 RX ORDER — ONDANSETRON 2 MG/ML
4 INJECTION INTRAMUSCULAR; INTRAVENOUS EVERY 6 HOURS PRN
Status: DISCONTINUED | OUTPATIENT
Start: 2020-03-25 | End: 2020-03-27 | Stop reason: HOSPADM

## 2020-03-25 RX ORDER — DEXTROSE MONOHYDRATE 25 G/50ML
12.5 INJECTION, SOLUTION INTRAVENOUS PRN
Status: DISCONTINUED | OUTPATIENT
Start: 2020-03-25 | End: 2020-03-27 | Stop reason: HOSPADM

## 2020-03-25 RX ORDER — LEVOTHYROXINE SODIUM 0.1 MG/1
200 TABLET ORAL DAILY
Status: DISCONTINUED | OUTPATIENT
Start: 2020-03-25 | End: 2020-03-27 | Stop reason: HOSPADM

## 2020-03-25 RX ORDER — FUROSEMIDE 10 MG/ML
40 INJECTION INTRAMUSCULAR; INTRAVENOUS 2 TIMES DAILY
Status: DISCONTINUED | OUTPATIENT
Start: 2020-03-25 | End: 2020-03-25

## 2020-03-25 RX ORDER — SODIUM CHLORIDE 0.9 % (FLUSH) 0.9 %
10 SYRINGE (ML) INJECTION EVERY 12 HOURS SCHEDULED
Status: DISCONTINUED | OUTPATIENT
Start: 2020-03-25 | End: 2020-03-27 | Stop reason: HOSPADM

## 2020-03-25 RX ORDER — CEFEPIME HYDROCHLORIDE 2 G/50ML
2 INJECTION, SOLUTION INTRAVENOUS EVERY 12 HOURS
Status: DISCONTINUED | OUTPATIENT
Start: 2020-03-25 | End: 2020-03-25

## 2020-03-25 RX ORDER — PROMETHAZINE HYDROCHLORIDE 12.5 MG/1
12.5 TABLET ORAL EVERY 6 HOURS PRN
Status: DISCONTINUED | OUTPATIENT
Start: 2020-03-25 | End: 2020-03-27 | Stop reason: HOSPADM

## 2020-03-25 RX ORDER — METHYLPREDNISOLONE SODIUM SUCCINATE 125 MG/2ML
60 INJECTION, POWDER, LYOPHILIZED, FOR SOLUTION INTRAMUSCULAR; INTRAVENOUS EVERY 8 HOURS
Status: DISCONTINUED | OUTPATIENT
Start: 2020-03-25 | End: 2020-03-26

## 2020-03-25 RX ORDER — IPRATROPIUM BROMIDE AND ALBUTEROL SULFATE 2.5; .5 MG/3ML; MG/3ML
1 SOLUTION RESPIRATORY (INHALATION)
Status: DISCONTINUED | OUTPATIENT
Start: 2020-03-25 | End: 2020-03-27 | Stop reason: HOSPADM

## 2020-03-25 RX ORDER — ACETAMINOPHEN 650 MG/1
650 SUPPOSITORY RECTAL EVERY 6 HOURS PRN
Status: DISCONTINUED | OUTPATIENT
Start: 2020-03-25 | End: 2020-03-27 | Stop reason: HOSPADM

## 2020-03-25 RX ORDER — SODIUM CHLORIDE 0.9 % (FLUSH) 0.9 %
10 SYRINGE (ML) INJECTION EVERY 12 HOURS SCHEDULED
Status: DISCONTINUED | OUTPATIENT
Start: 2020-03-25 | End: 2020-03-25 | Stop reason: SDUPTHER

## 2020-03-25 RX ORDER — NICOTINE POLACRILEX 4 MG
15 LOZENGE BUCCAL PRN
Status: DISCONTINUED | OUTPATIENT
Start: 2020-03-25 | End: 2020-03-27 | Stop reason: HOSPADM

## 2020-03-25 RX ORDER — HYDRALAZINE HYDROCHLORIDE 20 MG/ML
10 INJECTION INTRAMUSCULAR; INTRAVENOUS EVERY 6 HOURS PRN
Status: DISCONTINUED | OUTPATIENT
Start: 2020-03-25 | End: 2020-03-27 | Stop reason: HOSPADM

## 2020-03-25 RX ORDER — METHYLPREDNISOLONE SODIUM SUCCINATE 125 MG/2ML
125 INJECTION, POWDER, LYOPHILIZED, FOR SOLUTION INTRAMUSCULAR; INTRAVENOUS ONCE
Status: COMPLETED | OUTPATIENT
Start: 2020-03-25 | End: 2020-03-25

## 2020-03-25 RX ORDER — SODIUM CHLORIDE 0.9 % (FLUSH) 0.9 %
10 SYRINGE (ML) INJECTION PRN
Status: DISCONTINUED | OUTPATIENT
Start: 2020-03-25 | End: 2020-03-25 | Stop reason: SDUPTHER

## 2020-03-25 RX ADMIN — METHYLPREDNISOLONE SODIUM SUCCINATE 60 MG: 125 INJECTION, POWDER, FOR SOLUTION INTRAMUSCULAR; INTRAVENOUS at 20:56

## 2020-03-25 RX ADMIN — VANCOMYCIN HYDROCHLORIDE 1500 MG: 1 INJECTION, POWDER, LYOPHILIZED, FOR SOLUTION INTRAVENOUS at 15:30

## 2020-03-25 RX ADMIN — HEPARIN SODIUM 5000 UNITS: 5000 INJECTION INTRAVENOUS; SUBCUTANEOUS at 20:56

## 2020-03-25 RX ADMIN — IPRATROPIUM BROMIDE AND ALBUTEROL SULFATE 1 AMPULE: .5; 3 SOLUTION RESPIRATORY (INHALATION) at 14:13

## 2020-03-25 RX ADMIN — INSULIN LISPRO 1 UNITS: 100 INJECTION, SOLUTION INTRAVENOUS; SUBCUTANEOUS at 21:02

## 2020-03-25 RX ADMIN — LEVOTHYROXINE SODIUM 200 MCG: 100 TABLET ORAL at 14:50

## 2020-03-25 RX ADMIN — CEFEPIME HYDROCHLORIDE 2 G: 2 INJECTION, POWDER, FOR SOLUTION INTRAVENOUS at 11:58

## 2020-03-25 RX ADMIN — PAROXETINE HYDROCHLORIDE 20 MG: 20 TABLET, FILM COATED ORAL at 14:50

## 2020-03-25 RX ADMIN — LOSARTAN POTASSIUM 100 MG: 100 TABLET, FILM COATED ORAL at 14:50

## 2020-03-25 RX ADMIN — HEPARIN SODIUM 5000 UNITS: 5000 INJECTION INTRAVENOUS; SUBCUTANEOUS at 14:50

## 2020-03-25 RX ADMIN — BUMETANIDE 0.5 MG/HR: 0.25 INJECTION INTRAMUSCULAR; INTRAVENOUS at 15:36

## 2020-03-25 RX ADMIN — PERFLUTREN 1.65 MG: 6.52 INJECTION, SUSPENSION INTRAVENOUS at 16:15

## 2020-03-25 RX ADMIN — SODIUM CHLORIDE, PRESERVATIVE FREE 10 ML: 5 INJECTION INTRAVENOUS at 20:56

## 2020-03-25 RX ADMIN — FUROSEMIDE 40 MG: 10 INJECTION, SOLUTION INTRAMUSCULAR; INTRAVENOUS at 13:28

## 2020-03-25 RX ADMIN — IPRATROPIUM BROMIDE AND ALBUTEROL SULFATE 1 AMPULE: .5; 3 SOLUTION RESPIRATORY (INHALATION) at 11:13

## 2020-03-25 RX ADMIN — METHYLPREDNISOLONE SODIUM SUCCINATE 60 MG: 125 INJECTION, POWDER, FOR SOLUTION INTRAMUSCULAR; INTRAVENOUS at 13:27

## 2020-03-25 RX ADMIN — METHYLPREDNISOLONE SODIUM SUCCINATE 125 MG: 125 INJECTION, POWDER, FOR SOLUTION INTRAMUSCULAR; INTRAVENOUS at 11:56

## 2020-03-25 RX ADMIN — IPRATROPIUM BROMIDE AND ALBUTEROL SULFATE 1 AMPULE: .5; 3 SOLUTION RESPIRATORY (INHALATION) at 18:14

## 2020-03-25 ASSESSMENT — ENCOUNTER SYMPTOMS
BACK PAIN: 0
EYE PAIN: 0
RESPIRATORY NEGATIVE: 1
DIARRHEA: 0
COLOR CHANGE: 0
EYE DISCHARGE: 0
WHEEZING: 1
SHORTNESS OF BREATH: 1
GASTROINTESTINAL NEGATIVE: 1
RHINORRHEA: 0
PHOTOPHOBIA: 0
ABDOMINAL DISTENTION: 0
SHORTNESS OF BREATH: 0
VOMITING: 0
SORE THROAT: 0
COUGH: 0
ABDOMINAL PAIN: 0
NAUSEA: 0
EYES NEGATIVE: 1
APNEA: 0

## 2020-03-25 ASSESSMENT — PAIN SCALES - GENERAL
PAINLEVEL_OUTOF10: 0
PAINLEVEL_OUTOF10: 0

## 2020-03-25 ASSESSMENT — PAIN SCALES - WONG BAKER: WONGBAKER_NUMERICALRESPONSE: 0

## 2020-03-25 NOTE — ED PROVIDER NOTES
daily  Refills: 0      ferrous sulfate (RHIANNON-KAN) 325 (65 FE) MG tablet Take 1 tablet by mouth daily (with breakfast)  Qty: 30 tablet, Refills: 3      !! acetaminophen (TYLENOL) 500 MG tablet Take 500 mg by mouth every morning      docusate sodium (COLACE) 100 MG capsule Take 1 capsule by mouth 2 times daily  Qty: 20 capsule, Refills: 0      ONETOUCH VERIO strip TEST BLOOD SUGAR 3 TIMES A DAY. E11.9  Qty: 50 strip, Refills: 3      diltiazem (CARDIZEM CD) 180 MG extended release capsule TAKE (2) CAPSULES BY MOUTH ONCE DAILY. Qty: 60 capsule, Refills: 5      atorvastatin (LIPITOR) 10 MG tablet TAKE 1 TABLET BY MOUTH IN THE EVENING FOR HIGH CHOLESTEROL. Qty: 30 tablet, Refills: 5      furosemide (LASIX) 40 MG tablet TAKE 1 TABLET BY MOUTH ONCE DAILY. Qty: 90 tablet, Refills: 0      losartan (COZAAR) 100 MG tablet TAKE 1 TABLET IN THE MORNING FOR HIGH BLOOD PRESSURE AND TO PROTECT KIDNEYS. Qty: 30 tablet, Refills: 3      !! ULTICARE INSULIN SYRINGE 29G X 1/2\" 1 ML MISC USE AS DIRECTED  Qty: 60 each, Refills: 5      PARoxetine (PAXIL) 20 MG tablet TAKE 1 TABLET BY MOUTH IN THE MORNING. Qty: 90 tablet, Refills: 3      LANTUS 100 UNIT/ML injection vial INJECT 10 UNITS IN THE MORNING AND 60 UNITS IN THE EVENING  Qty: 60 mL, Refills: 5      levothyroxine (SYNTHROID) 200 MCG tablet TAKE 1 TABLET BY MOUTH ONCE DAILY. Qty: 90 tablet, Refills: 5      BD INSULIN SYRINGE ULTRAFINE 31G X 5/16\" 0.3 ML MISC USE AS DIRECTED 3 TIMES A DAY.   Qty: 100 each, Refills: 5      Blood Glucose Monitoring Suppl (ONETOUCH VERIO SYNC SYSTEM) W/DEVICE KIT 1 Units by Does not apply route as needed  Qty: 1 kit, Refills: 0      !! ULTICARE INSULIN SYRINGE 29G X 1/2\" 1 ML MISC USE AS DIRECTED  Qty: 60 each, Refills: 5      BD ULTRA-FINE PEN NEEDLES 29G X 12.7MM MISC USE AS DIRECTED  Qty: 100 each, Refills: 3      senna (SENOKOT) 8.6 MG tablet Take 1 tablet by mouth as needed for Constipation Indications: Constipation      magnesium hydroxide (MILK OF MAGNESIA) 400 MG/5ML suspension Take 5 mLs by mouth daily as needed for Constipation      allopurinol (ZYLOPRIM) 300 MG tablet TAKE 1 TABLET BY MOUTH ONCE DAILY. Qty: 90 tablet, Refills: 3       !! - Potential duplicate medications found. Please discuss with provider. ALLERGIES     Flagyl [metronidazole];  Levofloxacin; and Zithromax [azithromycin dihydrate]    FAMILY HISTORY       Family History   Problem Relation Age of Onset    Heart Disease Mother     Diabetes Father     Heart Disease Father     Cancer Maternal Aunt         breast    Heart Disease Maternal Aunt     Cancer Sister         breast          SOCIAL HISTORY       Social History     Socioeconomic History    Marital status: Single     Spouse name: None    Number of children: None    Years of education: None    Highest education level: None   Occupational History    Occupation: retired   Social Needs    Financial resource strain: None    Food insecurity     Worry: None     Inability: None    Transportation needs     Medical: None     Non-medical: None   Tobacco Use    Smoking status: Never Smoker    Smokeless tobacco: Never Used   Substance and Sexual Activity    Alcohol use: No    Drug use: No    Sexual activity: None   Lifestyle    Physical activity     Days per week: None     Minutes per session: None    Stress: None   Relationships    Social connections     Talks on phone: None     Gets together: None     Attends [employer] service: None     Active member of club or organization: None     Attends meetings of clubs or organizations: None     Relationship status: None    Intimate partner violence     Fear of current or ex partner: None     Emotionally abused: None     Physically abused: None     Forced sexual activity: None   Other Topics Concern    None   Social History Narrative    None       SCREENINGS    Ida Coma Scale  Eye Opening: Spontaneous  Best Verbal Response: Confused  Best Motor Response: Obeys normal limits    Narrative:     CALL  Subramanian  DAVIDEMABEL tel. ,  melanie rosario rn, 03/25/2020 11:33, by [facility]   LACTIC ACID, PLASMA - Abnormal; Notable for the following components:    Lactic Acid 0.4 (*)     All other components within normal limits   BLOOD GAS, ARTERIAL - Abnormal; Notable for the following components:    pH, Arterial 7.210 (*)     pCO2, Arterial 100.0 (*)     pO2, Arterial 53.0 (*)     HCO3, Arterial 40.0 (*)     Base Excess, Arterial 9.1 (*)     Hemoglobin, Art, Extended 11.1 (*)     O2 Sat, Arterial 88.6 (*)     All other components within normal limits    Narrative:     CALL  Subramanian  Harlin Apley, RN, 03/25/2020 15:58, by Ishmael Godinez   POCT GLUCOSE - Abnormal; Notable for the following components:    POC Glucose 162 (*)     All other components within normal limits   CULTURE, BLOOD 1   CULTURE, BLOOD 2   RESPIRATORY VIRUS PCR PANEL   POTASSIUM, WHOLE BLOOD   PROTIME-INR   APTT   LACTIC ACID, PLASMA   TROPONIN   TROPONIN   POTASSIUM, WHOLE BLOOD   TROPONIN   TROPONIN   POCT GLUCOSE   POCT GLUCOSE   POCT GLUCOSE   POCT GLUCOSE       All other labs were within normal range or notreturned as of this dictation. RE-ASSESSMENT        EMERGENCY DEPARTMENT COURSE and DIFFERENTIAL DIAGNOSIS/MDM:   Vitals:    Vitals:    03/25/20 1414 03/25/20 1500 03/25/20 1559 03/25/20 1600   BP:       Pulse:  69  67   Resp: 12 16 15 14   Temp:       SpO2: 97% 94% 91% 90%   Weight:       Height:           MDM  Patient is in respiratory distress her ABGs support respiratory acidosis BiPAP is been ordered. Dr. Mariah Guzman has seen this patient separate from myself agreeable to admission to ICU and Lasix have been ordered to get fluid off lungs. We have additionally ordered Vanco with pharmacy to dose and cefepime for concern for right lobe upper and lower infiltrate. PROCEDURES:    Procedures      FINAL IMPRESSION      1. Acute respiratory failure with hypoxia and hypercapnia (HCC)    2.  Acute on chronic congestive heart failure, unspecified heart failure type ([facility] [address]    3.  Pneumonia due to organism          DISPOSITION/PLAN   DISPOSITION        PATIENT REFERRED TO:  Bon Carney MD  [address] [place] [id number] [phone]            DISCHARGE MEDICATIONS:  Current Discharge Medication List          (Please note that portions of this note were completed with a voice recognition program.  Efforts were made to edit the dictations but occasionallywords are mis-transcribed.)    Adriane Moffett [address] [place] [address]  03/25/20 7457

## 2020-03-25 NOTE — ED PROVIDER NOTES
Attending Supervisory Note/Shared Visit   I have personally performed a face to face diagnostic evaluation on this patient. I have reviewed the mid-levels findings and agree. Bela Garces is a 39-year-old female presents emergency department from [address] [place] [address]. She wears 2-3L nasal cannula at baseline. She has known history of congestive heart failure. She is morbidly obese in bed bound. Sent from facility today for increased shortness of breath. Patient arrives with stable vital signs other than being somewhat tachypneic, she is currently alert and oriented x3 but somewhat somnolent, she is morbidly obese, difficult lung exam due to her obesity decreased breath sounds overall bilaterally, chronic bilateral lower extremity lymphedema, regular rate and rhythm no obvious murmurs difficult exam    Patient placed on BiPAP immediately after my evaluation due to her significant respiratory acidosis, she tells me she is breathing much better and appears clinically to be improving in the emergency department, suspects her respiratory acidosis likely related to her obesity and lack of ventilation due to volume overload,  as I do not see any prior history of COPD, her chest x-ray appears consistent with CHF and also possible infiltrate, have covered with antibx and given lasix, d/w Dr. Rosa Maria Vicente for admission    CRITICAL CARE TIME   Total Critical Care time was 40 minutes, excluding separately reportable procedures. There was a high probability of clinically significant/life threatening deterioration in the patient's condition which required my urgent intervention. Immediate evaluation, BiPAP, numerous re-evaluations, lab and imaging interpretation, discussion with ICU attending        FINAL IMPRESSION      1. Acute respiratory failure with hypoxia and hypercapnia (HCC)    2. Acute on chronic congestive heart failure, unspecified heart failure type ([address]    3.  Pneumonia due to organism          SHANTELL Buddy Ojeda MD  Attending Emergency Physician        Norm Mckeon MD  03/25/20 [address], MD  03/25/20 [address], MD  03/25/20 7582

## 2020-03-25 NOTE — H&P
ULTICARE INSULIN SYRINGE 29G X 1/2\" 1 ML MISC USE AS DIRECTED 1/26/17  Yes Boni Melendez MD   PARoxetine (PAXIL) 20 MG tablet TAKE 1 TABLET BY MOUTH IN THE MORNING. 11/14/16  Yes Boni Melendez MD   LANTUS 100 UNIT/ML injection vial INJECT 10 UNITS IN THE MORNING AND 60 UNITS IN THE EVENING  Patient taking differently: INJECT 10 UNITS IN THE MORNING AND 50 UNITS IN THE EVENING 10/3/16  Yes Boni Melendez MD   levothyroxine (SYNTHROID) 200 MCG tablet TAKE 1 TABLET BY MOUTH ONCE DAILY. 9/21/16  Yes Boni Melendez MD   BD INSULIN SYRINGE ULTRAFINE 31G X 5/16\" 0.3 ML MISC USE AS DIRECTED 3 TIMES A DAY. 6/9/16  Yes Boni Melendez MD   Blood Glucose [facility]l [facility] [address]) W/DEVICE KIT 1 Units by Does not apply route as needed 12/17/15  Yes MD Stone Lopez Serge INSULIN SYRINGE 29G X 1/2\" 1 ML MISC USE AS DIRECTED 2/25/15  Yes Boni eMlendez MD   BD ULTRA-FINE PEN NEEDLES 29G X 12.7MM MISC USE AS DIRECTED 7/30/13  Yes Boni Melendez MD   senna (SENOKOT) 8.6 MG tablet Take 1 tablet by mouth as needed for Constipation Indications: Constipation    Historical Provider, MD   magnesium hydroxide (MILK OF MAGNESIA) 400 MG/5ML suspension Take 5 mLs by mouth daily as needed for Constipation    Historical Provider, MD   allopurinol (ZYLOPRIM) 300 MG tablet TAKE 1 TABLET BY MOUTH ONCE DAILY. 10/3/16   Boni Melendez MD   diltiazem (DILACOR XR) 180 MG XR capsule TAKE 2 CAPSULES DAILY. 1/16/14 7/1/14  Boni Melendez MD       Allergies:    Flagyl [metronidazole]; Levofloxacin; and Zithromax [azithromycin dihydrate]    Social History:    The patient currently lives in NH  Tobacco:   reports that she has never smoked. She has never used smokeless tobacco.  Alcohol:   reports no history of alcohol use.   Illicit Drugs: denies    Family History:      Problem Relation Age of Onset    Heart Disease Mother     Diabetes Father     Heart Disease Father     Cancer Maternal Aunt         breast    Heart Disease

## 2020-03-25 NOTE — CONSULTS
Pulmonary and Critical Care Consult Note  [facility] Juan José Jacinto    MR# [id number]    Acct# [de-identified]  3/25/2020   3:06 PM CDT    Referring Isai Grier MD  Chief Complaint: Acute superimposed on chronic respiratory failure. HPI: We have been consulted to see this 69y.o. year old female born on 1950. The patient is a super morbidly obese white female with chronic respiratory failure. She is admitted because of problems with increasing dyspnea and hypoxemia at her place of residence at a nursing home. X-ray does show some right lung infiltrates. Her CO2 was markedly elevated but with some degree of respiratory compensation consistent with chronic hypercapnic respiratory failure. She is on chronic oxygen therapy. She presently is very drowsy but arousable but cannot perform any cogent history. Past Medical History      Past Medical History:   Diagnosis Date    Cellulitis     Gout     Hyperlipidemia     Hypertension     Hypothyroidism     Lymphadenitis     Morbid obesity ([address]     Type II or unspecified type diabetes mellitus without mention of complication, not stated as uncontrolled      SurgicalHistory  Past Surgical History:   Procedure Laterality Date    CATARACT REMOVAL      10/30/13 left.  12/31/13 right    CHEST SURGERY      left    CHOLECYSTECTOMY      CYST REMOVAL      RECTAL SURGERY      TONSILLECTOMY       Allergies  Allergies   Allergen Reactions    Flagyl [Metronidazole]     Levofloxacin     Zithromax [Azithromycin Dihydrate]      Medications    sodium chloride flush, 10 mL, Intravenous, 2 times per day    ipratropium-albuterol, 1 ampule, Inhalation, Q4H WA    methylPREDNISolone, 60 mg, Intravenous, Q8H    [START ON 3/26/2020] cefepime, 2 g, Intravenous, Q8H    insulin lispro, 0-6 Units, Subcutaneous, TID WC    insulin lispro, 0-3 Units, Subcutaneous, Nightly    levothyroxine, 200 mcg, Oral, Daily    losartan, 100 mg, Oral, Daily   PARoxetine, 20 mg, Oral, Daily    heparin (porcine), 5,000 Units, Subcutaneous, 3 times per day    vancomycin, 1,500 mg, Intravenous, Q12H    vancomycin (VANCOCIN) intermittent dosing (placeholder), , Other, RX Placeholder   Social History   reports that she has never smoked. She has never used smokeless tobacco. She reports that she does not drink alcohol or use drugs. Family History  family history includes Cancer in her maternal aunt and sister; Diabetes in her father; Heart Disease in her father, maternal aunt, and mother. Review of Systems:  Review of Systems   Unable to perform ROS: Mental status change     Physical Exam:   height is 5' 5\" (1.651 m) and weight is 500 lb (226.8 kg) (abnormal). Her temperature is 97.6 °F (36.4 °C). Her blood pressure is 152/47 (abnormal) and her pulse is 69. Her respiration is 12 and oxygen saturation is 97%. No intake or output data in the 24 hours ending 03/25/20 1506  Physical Exam  Vitals signs and nursing note reviewed. Constitutional:       Comments: She is a super morbidly obese white female. HENT:      Head:      Comments: BiPAP mask is in place. Eyes:      Extraocular Movements: Extraocular movements intact. Pupils: Pupils are equal, round, and reactive to light. Neck:      Comments: Her neck is very thick. Cardiovascular:      Comments: Heart sounds are very distant. Pulmonary:      Comments: Breath sounds are diminished with some coarse rhonchi on the right. Abdominal:      Comments: Her abdomen is morbidly obese. Musculoskeletal:         General: Swelling present. Comments: She has edema of the upper and lower extremities with severe chronic venous stasis changes and brawny edema with thickened skin over the calves. Skin:     Comments: Streaming severe chronic venous stasis changes are present over the calves with very thickened keratotic skin. Neurological:      Comments: She is very drowsy but will arouse.    Psychiatric: Comments: Cannot assess accurately due to her level of obtundation. Recent Labs     03/25/20  1150   WBC 13.0*   RBC 3.80*   HGB 10.8*   HCT 38.0      .0*   MCH 28.4   MCHC 28.4*   RDW 15.1*      Recent Labs     03/25/20  1128 03/25/20  1150   NA  --  142   K 4.9 5.1*   CL  --  101   CO2  --  34*   BUN  --  36*   CREATININE  --  1.2*   CALCIUM  --  9.9   GLUCOSE  --  110*      Recent Labs     03/25/20  1128   PHART 7.140*   PDM3HWL 125.0*   PO2ART 96.0   OWP0HQL 42.6*   [id number] 96.1   BEART 9.8*     Recent Labs     03/25/20  1150   AST 8   ALT <5*   ALKPHOS 98   BILITOT <0.2   CALCIUM 9.9   TROPONINI 0.01   LACTA 0.4*   INR 1.04     No results for input(s): BC, LABGRAM, CULTRESP, BFCX in the last 72 hours. Radiograph: Xr Chest Portable    Result Date: 3/25/2020  Examination. XR CHEST PORTABLE 3/25/2020 10:15 AM History: Shortness of breath. A single frontal portable semiupright view of the chest is of very limited diagnostic value due to the body habitus and suboptimal exposure. There is a right upper and right lower lung infiltrate. The pulmonary vascular congestion. Lung bases are not well-visualized. Possibility of pleural effusion is not excluded. There is moderate cardiomegaly. No obvious bony abnormality. A very limited diagnostic study due to body habitus and suboptimal exposure. The pulmonary vascular congestion and cardiomegaly. The right upper and lower lung consolidation/infiltrate. Signed by Dr Hiral Gregg on 3/25/2020 11:21 AM    My radiograph interpretation/independent review of imaging: There is a suboptimal film due to her body habitus but she appears to have some degree of pulmonary vascular congestion and some right lung infiltrate along with cardiomegaly. Other test results (not lab or imaging): An echocardiogram done just over a year ago was consistent with diastolic dysfunction and some mild pulmonary hypertension.   Independent review of ekg:   Problem list generated

## 2020-03-25 NOTE — ED NOTES
Bed: 01-A  Expected date:   Expected time:   Means of arrival:   Comments:  JAY JAY VALLEJO AT [place], [facility]  03/25/20 4774

## 2020-03-25 NOTE — PROGRESS NOTES
Pharmacy Renal Adjustment    Lakshmi Parks is a 69 y.o. female. Pharmacy has renally adjusted medications per protocol. Recent Labs     03/25/20  1150   BUN 36*       Recent Labs     03/25/20  1150   CREATININE 1.2*       Estimated Creatinine Clearance: 85 mL/min (A) (based on SCr of 1.2 mg/dL (H)).     Height:   Ht Readings from Last 1 Encounters:   03/25/20 5' 5\" (1.651 m)     Weight:  Wt Readings from Last 1 Encounters:   03/25/20 (!) 500 lb (226.8 kg)       CrCl = 85    Plan: Adjust the following medications based on renal function:           Increase Cefepime 2 grams IV Q 12 hours to Cefepime 2 grams IV Q 8 hours for Pneumonia    Electronically signed by Yonatan Mazariegos RPH on 3/25/2020 at 1:16 PM

## 2020-03-25 NOTE — CONSULTS
[facility]  Cardiology Consult      Requesting MD:  Clay Maciel MD   Admit Status:  Inpatient [101]       History obtained from:   [] Patient  [] Other (specify):     Patient:  Jak Carrillo  [id number]     Chief Complaint:   Chief Complaint   Patient presents with    Shortness of Breath       HPI: Ms. Awilda Caba is a 69 y.o. female nursing home resident with a history of hypertension hyperlipidemia diabetes mellitus type 2 morbid obesity who comes in now with complaints of increased dyspnea and confusion. On O2 3 to 4 L nasal cannula baseline. History of chronic kidney disease stage III and chronic congestive heart failure. Known edema bilateral lymphedema. Lactic acid 0.5 troponin negative. proBNP level 1790. Arterial blood gases pH 7.140 CO2 12.5 PO2 96.0 saturation 96.1. Review of Systems:  Review of Systems   Constitutional: Negative. Negative for chills, fever and unexpected weight change. HENT: Negative. Eyes: Negative. Respiratory: Negative. Negative for shortness of breath. Cardiovascular: Negative. Negative for chest pain. Gastrointestinal: Negative. Negative for diarrhea, nausea and vomiting. Endocrine: Negative. Genitourinary: Negative. Musculoskeletal: Negative. Skin: Negative. Neurological: Negative. All other systems reviewed and are negative. Cardiac Specific Data:  Specialty Problems        Cardiology Problems    Hyperlipidemia        Hypertension        CHF (congestive heart failure) (HCC)              Past Medical History:  Past Medical History:   Diagnosis Date    Cellulitis     Gout     Hyperlipidemia     Hypertension     Hypothyroidism     Lymphadenitis     Morbid obesity ([address]     Type II or unspecified type diabetes mellitus without mention of complication, not stated as uncontrolled         Past Surgical History:  Past Surgical History:   Procedure Laterality Date    CATARACT REMOVAL      10/30/13 left.  12/31/13 right    CHEST SURGERY      left    CHOLECYSTECTOMY      CYST REMOVAL      RECTAL SURGERY      TONSILLECTOMY         Past Family History:  Family History   Problem Relation Age of Onset    Heart Disease Mother     Diabetes Father     Heart Disease Father     Cancer Maternal Aunt         breast    Heart Disease Maternal Aunt     Cancer Sister         breast       Past Social History:  Social History     Socioeconomic History    Marital status: Single     Spouse name: Not on file    Number of children: Not on file    Years of education: Not on file    Highest education level: Not on file   Occupational History    Occupation: retired   Social Needs    Financial resource strain: Not on file    Food insecurity     Worry: Not on file     Inability: Not on file   [employer] needs     Medical: Not on file     Non-medical: Not on file   Tobacco Use    Smoking status: Never Smoker    Smokeless tobacco: Never Used   Substance and Sexual Activity    Alcohol use: No    Drug use: No    Sexual activity: Not on file   Lifestyle    Physical activity     Days per week: Not on file     Minutes per session: Not on file    Stress: Not on file   Relationships    Social connections     Talks on phone: Not on file     Gets together: Not on file     Attends Confucianism service: Not on file     Active member of club or organization: Not on file     Attends meetings of clubs or organizations: Not on file     Relationship status: Not on file    Intimate partner violence     Fear of current or ex partner: Not on file     Emotionally abused: Not on file     Physically abused: Not on file     Forced sexual activity: Not on file   Other Topics Concern    Not on file   Social History Narrative    Not on file       Allergies:   Allergies   Allergen Reactions    Flagyl [Metronidazole]     Levofloxacin     Zithromax [Azithromycin Dihydrate]        Home Meds:  Prior to Admission medications    Medication Sig Start

## 2020-03-25 NOTE — CONSULTS
(PHENERGAN) tablet 12.5 mg  12.5 mg Oral Q6H PRN Pauline Bolden MD        Or    [facility]) injection 4 mg  4 mg Intravenous Q6H PRN Pauline Bolden MD        ipratropium-albuterol (DUONEB) nebulizer solution 1 ampule  1 ampule Inhalation Q4H WA Pauline Bolden MD   1 ampule at 03/25/20 1413    methylPREDNISolone sodium (SOLU-MEDROL) injection 60 mg  60 mg Intravenous Q8H Pauline Bolden MD   60 mg at 03/25/20 1327    [START ON 3/26/2020] ceFEPIme (MAXIPIME) 2 g in sterile water 20 mL IV syringe  2 g Intravenous Cierra Jensen MD        insulin lispro (HUMALOG) injection vial 0-6 Units  0-6 Units Subcutaneous TID WC Pauline Bolden MD        insulin lispro (HUMALOG) injection vial 0-3 Units  0-3 Units Subcutaneous Nightly Pauline Bolden MD        glucose (GLUTOSE) 40 % oral gel 15 g  15 g Oral PRN Pauline Bolden MD        dextrose 50 % IV solution  12.5 g Intravenous PRN Pauline Bolden MD        glucagon (rDNA) injection 1 mg  1 mg Intramuscular PRN Pauline Bolden MD        dextrose 5 % solution  100 mL/hr Intravenous PRN Pauline Bolden MD        levothyroxine (SYNTHROID) tablet 200 mcg  200 mcg Oral Daily Pauline Bolden MD        losartan (COZAAR) tablet 100 mg  100 mg Oral Daily Pauline Bolden MD        PARoxetine (PAXIL) tablet 20 mg  20 mg Oral Daily Pauline Bolden MD        heparin (porcine) injection 5,000 Units  5,000 Units Subcutaneous 3 times per day Pauline Bolden MD        vancomycin (VANCOCIN) 1,500 mg in dextrose 5 % 500 mL IVPB  1,500 mg Intravenous Q12H Pauline Bolden MD        vancomycin [facility]) intermittent dosing (placeholder)   Other RX Placeholder Pauline Bolden MD        bumetanide (BUMEX) 12.5 mg in sodium chloride 0.9 % 125 mL infusion  0.5 mg/hr Intravenous Continuous Pauline Bolden MD           Past Medical History:  Past Medical History:   Diagnosis Date    Cellulitis     Gout     Hyperlipidemia     Hypertension     Hypothyroidism     Lymphadenitis     Morbid [facility] ([facility]     Type II or unspecified type diabetes mellitus without mention of complication, not stated as uncontrolled        Past Surgical History:  Past Surgical History:   Procedure Laterality Date    CATARACT REMOVAL      10/30/13 left.  12/31/13 right    CHEST SURGERY      left    CHOLECYSTECTOMY      CYST REMOVAL      RECTAL SURGERY      TONSILLECTOMY         Family History  Family History   Problem Relation Age of Onset    Heart Disease Mother     Diabetes Father     Heart Disease Father     Cancer Maternal Aunt         breast    Heart Disease Maternal Aunt     Cancer Sister         breast       Social History  Social History     Socioeconomic History    Marital status: Single     Spouse name: Not on file    Number of children: Not on file    Years of education: Not on file    Highest education level: Not on file   Occupational History    Occupation: retired   Social Needs    Financial resource strain: Not on file    Food insecurity     Worry: Not on file     Inability: Not on file   [employer] needs     Medical: Not on file     Non-medical: Not on file   Tobacco Use    Smoking status: Never Smoker    Smokeless tobacco: Never Used   Substance and Sexual Activity    Alcohol use: No    Drug use: No    Sexual activity: Not on file   Lifestyle    Physical activity     Days per week: Not on file     Minutes per session: Not on file    Stress: Not on file   Relationships    Social connections     Talks on phone: Not on file     Gets together: Not on file     Attends Jew service: Not on file     Active member of club or organization: Not on file     Attends meetings of clubs or organizations: Not on file     Relationship status: Not on file    Intimate partner violence     Fear of current or ex partner: Not on file     Emotionally abused: Not on file     Physically abused: Not on file     Forced sexual activity: Not on file   Other Topics Concern    Not on file

## 2020-03-26 ENCOUNTER — OUTSIDE FACILITY SERVICE (OUTPATIENT)
Dept: PULMONOLOGY | Facility: CLINIC | Age: 70
End: 2020-03-26

## 2020-03-26 LAB
ANION GAP SERPL CALCULATED.3IONS-SCNC: 18 MMOL/L (ref 7–19)
BUN BLDV-MCNC: 37 MG/DL (ref 8–23)
CALCIUM SERPL-MCNC: 10 MG/DL (ref 8.8–10.2)
CHLORIDE BLD-SCNC: 98 MMOL/L (ref 98–111)
CO2: 30 MMOL/L (ref 22–29)
CREAT SERPL-MCNC: 1 MG/DL (ref 0.5–0.9)
EKG P AXIS: 64 DEGREES
EKG P-R INTERVAL: 182 MS
EKG Q-T INTERVAL: 374 MS
EKG QRS DURATION: 100 MS
EKG QTC CALCULATION (BAZETT): 407 MS
EKG T AXIS: 94 DEGREES
FERRITIN: 335 NG/ML (ref 13–150)
FOLATE: 8.6 NG/ML (ref 4.8–37.3)
GFR NON-AFRICAN AMERICAN: 55
GLUCOSE BLD-MCNC: 172 MG/DL (ref 70–99)
GLUCOSE BLD-MCNC: 174 MG/DL (ref 70–99)
GLUCOSE BLD-MCNC: 179 MG/DL (ref 70–99)
GLUCOSE BLD-MCNC: 180 MG/DL (ref 74–109)
GLUCOSE BLD-MCNC: 213 MG/DL (ref 70–99)
IRON SATURATION: 11 % (ref 14–50)
IRON: 28 UG/DL (ref 37–145)
MAGNESIUM: 2.2 MG/DL (ref 1.6–2.4)
PARATHYROID HORMONE INTACT: 91.6 PG/ML (ref 15–65)
PERFORMED ON: ABNORMAL
PHOSPHORUS: 3.2 MG/DL (ref 2.5–4.5)
POTASSIUM REFLEX MAGNESIUM: 5.2 MMOL/L (ref 3.5–5)
REASON FOR REJECTION: NORMAL
REJECTED TEST: NORMAL
SODIUM BLD-SCNC: 146 MMOL/L (ref 136–145)
STREP PNEUMONIAE ANTIGEN, URINE: NORMAL
TOTAL IRON BINDING CAPACITY: 248 UG/DL (ref 250–400)
TROPONIN: 0.01 NG/ML (ref 0–0.03)
TROPONIN: 0.01 NG/ML (ref 0–0.03)
TROPONIN: <0.01 NG/ML (ref 0–0.03)
TROPONIN: <0.01 NG/ML (ref 0–0.03)
URIC ACID, SERUM: 4.8 MG/DL (ref 2.4–5.7)
VITAMIN B-12: 1602 PG/ML (ref 211–946)
VITAMIN D 25-HYDROXY: 8.4 NG/ML

## 2020-03-26 PROCEDURE — 6360000002 HC RX W HCPCS: Performed by: HOSPITALIST

## 2020-03-26 PROCEDURE — 6370000000 HC RX 637 (ALT 250 FOR IP): Performed by: HOSPITALIST

## 2020-03-26 PROCEDURE — 94660 CPAP INITIATION&MGMT: CPT

## 2020-03-26 PROCEDURE — 80048 BASIC METABOLIC PNL TOTAL CA: CPT

## 2020-03-26 PROCEDURE — 2500000003 HC RX 250 WO HCPCS: Performed by: HOSPITALIST

## 2020-03-26 PROCEDURE — 84550 ASSAY OF BLOOD/URIC ACID: CPT

## 2020-03-26 PROCEDURE — 1210000000 HC MED SURG R&B

## 2020-03-26 PROCEDURE — 36415 COLL VENOUS BLD VENIPUNCTURE: CPT

## 2020-03-26 PROCEDURE — 82306 VITAMIN D 25 HYDROXY: CPT

## 2020-03-26 PROCEDURE — 2700000000 HC OXYGEN THERAPY PER DAY

## 2020-03-26 PROCEDURE — 82947 ASSAY GLUCOSE BLOOD QUANT: CPT

## 2020-03-26 PROCEDURE — 83735 ASSAY OF MAGNESIUM: CPT

## 2020-03-26 PROCEDURE — 6360000002 HC RX W HCPCS: Performed by: NURSE PRACTITIONER

## 2020-03-26 PROCEDURE — 82746 ASSAY OF FOLIC ACID SERUM: CPT

## 2020-03-26 PROCEDURE — 83970 ASSAY OF PARATHORMONE: CPT

## 2020-03-26 PROCEDURE — 87449 NOS EACH ORGANISM AG IA: CPT

## 2020-03-26 PROCEDURE — 2580000003 HC RX 258: Performed by: HOSPITALIST

## 2020-03-26 PROCEDURE — 83540 ASSAY OF IRON: CPT

## 2020-03-26 PROCEDURE — 94640 AIRWAY INHALATION TREATMENT: CPT

## 2020-03-26 PROCEDURE — 83550 IRON BINDING TEST: CPT

## 2020-03-26 PROCEDURE — 82728 ASSAY OF FERRITIN: CPT

## 2020-03-26 PROCEDURE — 84100 ASSAY OF PHOSPHORUS: CPT

## 2020-03-26 PROCEDURE — 84484 ASSAY OF TROPONIN QUANT: CPT

## 2020-03-26 PROCEDURE — 82607 VITAMIN B-12: CPT

## 2020-03-26 PROCEDURE — 99233 SBSQ HOSP IP/OBS HIGH 50: CPT | Performed by: INTERNAL MEDICINE

## 2020-03-26 RX ORDER — LABETALOL 20 MG/4 ML (5 MG/ML) INTRAVENOUS SYRINGE
20 EVERY 4 HOURS PRN
Status: DISCONTINUED | OUTPATIENT
Start: 2020-03-26 | End: 2020-03-26 | Stop reason: SDUPTHER

## 2020-03-26 RX ORDER — METHYLPREDNISOLONE SODIUM SUCCINATE 40 MG/ML
40 INJECTION, POWDER, LYOPHILIZED, FOR SOLUTION INTRAMUSCULAR; INTRAVENOUS EVERY 8 HOURS
Status: DISCONTINUED | OUTPATIENT
Start: 2020-03-26 | End: 2020-03-27 | Stop reason: HOSPADM

## 2020-03-26 RX ORDER — SODIUM CHLORIDE 0.9 % (FLUSH) 0.9 %
10 SYRINGE (ML) INJECTION PRN
Status: ACTIVE | OUTPATIENT
Start: 2020-03-26 | End: 2020-03-27

## 2020-03-26 RX ORDER — LABETALOL HYDROCHLORIDE 5 MG/ML
20 INJECTION, SOLUTION INTRAVENOUS EVERY 4 HOURS PRN
Status: DISCONTINUED | OUTPATIENT
Start: 2020-03-26 | End: 2020-03-27 | Stop reason: HOSPADM

## 2020-03-26 RX ADMIN — VANCOMYCIN HYDROCHLORIDE 1500 MG: 1 INJECTION, POWDER, LYOPHILIZED, FOR SOLUTION INTRAVENOUS at 15:57

## 2020-03-26 RX ADMIN — METHYLPREDNISOLONE SODIUM SUCCINATE 40 MG: 40 INJECTION, POWDER, FOR SOLUTION INTRAMUSCULAR; INTRAVENOUS at 22:15

## 2020-03-26 RX ADMIN — METHYLPREDNISOLONE SODIUM SUCCINATE 40 MG: 40 INJECTION, POWDER, FOR SOLUTION INTRAMUSCULAR; INTRAVENOUS at 13:40

## 2020-03-26 RX ADMIN — SODIUM CHLORIDE, PRESERVATIVE FREE 10 ML: 5 INJECTION INTRAVENOUS at 22:15

## 2020-03-26 RX ADMIN — IPRATROPIUM BROMIDE AND ALBUTEROL SULFATE 1 AMPULE: .5; 3 SOLUTION RESPIRATORY (INHALATION) at 15:15

## 2020-03-26 RX ADMIN — HEPARIN SODIUM 5000 UNITS: 5000 INJECTION INTRAVENOUS; SUBCUTANEOUS at 13:56

## 2020-03-26 RX ADMIN — IPRATROPIUM BROMIDE AND ALBUTEROL SULFATE 1 AMPULE: .5; 3 SOLUTION RESPIRATORY (INHALATION) at 06:28

## 2020-03-26 RX ADMIN — INSULIN LISPRO 1 UNITS: 100 INJECTION, SOLUTION INTRAVENOUS; SUBCUTANEOUS at 22:19

## 2020-03-26 RX ADMIN — BUMETANIDE 0.5 MG/HR: 0.25 INJECTION INTRAMUSCULAR; INTRAVENOUS at 11:56

## 2020-03-26 RX ADMIN — INSULIN LISPRO 1 UNITS: 100 INJECTION, SOLUTION INTRAVENOUS; SUBCUTANEOUS at 11:58

## 2020-03-26 RX ADMIN — METHYLPREDNISOLONE SODIUM SUCCINATE 60 MG: 125 INJECTION, POWDER, FOR SOLUTION INTRAMUSCULAR; INTRAVENOUS at 05:42

## 2020-03-26 RX ADMIN — LOSARTAN POTASSIUM 100 MG: 100 TABLET, FILM COATED ORAL at 07:40

## 2020-03-26 RX ADMIN — IPRATROPIUM BROMIDE AND ALBUTEROL SULFATE 1 AMPULE: .5; 3 SOLUTION RESPIRATORY (INHALATION) at 10:23

## 2020-03-26 RX ADMIN — HEPARIN SODIUM 5000 UNITS: 5000 INJECTION INTRAVENOUS; SUBCUTANEOUS at 05:42

## 2020-03-26 RX ADMIN — LABETALOL 20 MG/4 ML (5 MG/ML) INTRAVENOUS SYRINGE 20 MG: at 10:01

## 2020-03-26 RX ADMIN — HEPARIN SODIUM 5000 UNITS: 5000 INJECTION INTRAVENOUS; SUBCUTANEOUS at 22:14

## 2020-03-26 RX ADMIN — SODIUM CHLORIDE, PRESERVATIVE FREE 10 ML: 5 INJECTION INTRAVENOUS at 09:39

## 2020-03-26 RX ADMIN — HYDRALAZINE HYDROCHLORIDE 10 MG: 20 INJECTION INTRAMUSCULAR; INTRAVENOUS at 07:40

## 2020-03-26 RX ADMIN — PAROXETINE HYDROCHLORIDE 20 MG: 20 TABLET, FILM COATED ORAL at 07:40

## 2020-03-26 RX ADMIN — INSULIN LISPRO 1 UNITS: 100 INJECTION, SOLUTION INTRAVENOUS; SUBCUTANEOUS at 07:40

## 2020-03-26 RX ADMIN — LEVOTHYROXINE SODIUM 200 MCG: 100 TABLET ORAL at 05:42

## 2020-03-26 RX ADMIN — Medication 2 G: at 09:39

## 2020-03-26 RX ADMIN — Medication 2 G: at 15:57

## 2020-03-26 RX ADMIN — IPRATROPIUM BROMIDE AND ALBUTEROL SULFATE 1 AMPULE: .5; 3 SOLUTION RESPIRATORY (INHALATION) at 19:32

## 2020-03-26 RX ADMIN — VANCOMYCIN HYDROCHLORIDE 1500 MG: 1 INJECTION, POWDER, LYOPHILIZED, FOR SOLUTION INTRAVENOUS at 02:45

## 2020-03-26 ASSESSMENT — ENCOUNTER SYMPTOMS
EYES NEGATIVE: 1
SHORTNESS OF BREATH: 1
GASTROINTESTINAL NEGATIVE: 1

## 2020-03-26 ASSESSMENT — PAIN SCALES - WONG BAKER
WONGBAKER_NUMERICALRESPONSE: 0

## 2020-03-26 ASSESSMENT — PAIN SCALES - GENERAL
PAINLEVEL_OUTOF10: 0

## 2020-03-26 NOTE — PROGRESS NOTES
Mayer Cranker received from ICU to room # 309 . Mental Status: Patient is oriented, alert and coherent. Vitals:    03/26/20 1200   BP: 133/83   Pulse: 89   Resp: 22   Temp: 97.3 °F (36.3 °C)   SpO2: 90%     Placed on cardiac monitor: No.  Belongings: clothing with patient at bedside . Family at bedside No.  Oriented Patient to room. Call light within reach. Yes. Transfer was: Well tolerated by patient. .    Electronically signed by Shannan Quiroz RN on 3/26/2020 at 1:36 PM

## 2020-03-26 NOTE — PROGRESS NOTES
Respiratory failure ([facility] 03/25/2020    SBO (small bowel obstruction) ([facility])     Intractable nausea and vomiting 06/27/2019    Suicidal ideation 06/27/2019    HCAP (healthcare-associated pneumonia) 06/25/2019    Acute bronchitis due to other specified organisms 03/08/2019    Acute respiratory failure with hypoxia and hypercapnia ([facility]     Hypoxia 03/07/2019    Positive D dimer 03/07/2019    Ambulatory dysfunction     CKD (chronic kidney disease) stage 3, GFR 30-59 ml/min ([facility]) 06/01/2017    Anemia of unknown etiology 06/01/2017    CHF (congestive heart failure) ([facility] 06/01/2017    Closed displaced fracture of navicular bone of left foot 05/31/2017    Failure to thrive in adult 05/31/2017    Leukocytosis 05/31/2017    Acute cystitis with hematuria 05/31/2017    Type 2 diabetes mellitus with diabetic arthropathy, with long-term current use of insulin ([facility] 05/31/2017    Lymphedema of lower extremity 10/16/2012    Hyperlipidemia     Hypertension     Hypothyroidism     Morbid obesity with BMI of 70 and over, adult ([facility]     Gout     Cellulitis        Assessment/plan: Active Problems:  Active Problems:  Acute respiratory failure with hypoxia and hypercapnia ([facility])  Acute CHF exacerbation   right upper and right lower lung infiltrate. Anasarca  Bilateral lymphedema  CKD (chronic kidney disease) stage 3, GFR 30-59 ml/min ([facility])  Morbid obesity with BMI of 70 and over, adult ([facility]  Failure to thrive in adult  Type 2 diabetes mellitus with diabetic arthropathy, with long-term current use of insulin ([facility])  Hyperlipidemia  Hypertension  Hypothyroidism  Pulmonary HTN           Plan:   Plan:  Wean bipap  Pulm on board  Strict I's and O's  C.w monitoring U.O.  CT chest reviewed.   venous duplex ultrasound lower extremities- neg  Place Patient on empiric antibiotics  C/w Bumex drip ; nephrology consulted  for diuresis for severe anasarca  Consulted cardiology for severe pulmonary hypertension   echocardiogram

## 2020-03-26 NOTE — PROGRESS NOTES
tablet 20 mg  20 mg Oral Daily Carlos De Luna MD   20 mg at 03/26/20 0740    heparin (porcine) injection 5,000 Units  5,000 Units Subcutaneous 3 times per day Carlos De Luna MD   5,000 Units at 03/26/20 0542    vancomycin (VANCOCIN) 1,500 mg in dextrose 5 % 500 mL IVPB  1,500 mg Intravenous Q12H Carlos De Luna MD   Stopped at 03/26/20 0550    vancomycin (VANCOCIN) intermittent dosing (placeholder)   Other RX Placeholder Carlos De Luna MD        bumetanide (BUMEX) 12.5 mg in sodium chloride 0.9 % 125 mL infusion  0.5 mg/hr Intravenous Continuous Carlos De Luna MD 5 mL/hr at 03/25/20 1536 0.5 mg/hr at 03/25/20 1536    hydrALAZINE (APRESOLINE) injection 10 mg  10 mg Intravenous Q6H PRN Carlos De Luna MD   10 mg at 03/26/20 0740       Past Medical History:  Past Medical History:   Diagnosis Date    Cellulitis     Gout     Hyperlipidemia     Hypertension     Hypothyroidism     Lymphadenitis     Morbid [facility] ([facility]     Type II or unspecified type diabetes mellitus without mention of complication, not stated as uncontrolled        Past Surgical History:  Past Surgical History:   Procedure Laterality Date    CATARACT REMOVAL      10/30/13 left.  12/31/13 right    CHEST SURGERY      left    CHOLECYSTECTOMY      CYST REMOVAL      RECTAL SURGERY      TONSILLECTOMY         Family History  Family History   Problem Relation Age of Onset    Heart Disease Mother     Diabetes Father     Heart Disease Father     Cancer Maternal Aunt         breast    Heart Disease Maternal Aunt     Cancer Sister         breast       Social History  Social History     Socioeconomic History    Marital status: Single     Spouse name: Not on file    Number of children: Not on file    Years of education: Not on file    Highest education level: Not on file   Occupational History    Occupation: retired   Social Needs    Financial resource strain: Not on file    Food insecurity     Worry: Not on file     Inability: Not on file

## 2020-03-26 NOTE — PROGRESS NOTES
Tried nasal cannula, but pt got very anxious and wanted bipap back on until she can get pulled up in bed. Pulse ox decreased to 86% during this and placed back on bipap.  Nurse notified

## 2020-03-26 NOTE — PROGRESS NOTES
Pt was tolerating NC, then had to be placed back on BiPAP. Will hold evaluation at this time.         Electronically Signed By:  Lisseth Barreto M.S., CCC-SLP  3/26/2020,11:04 AM.

## 2020-03-26 NOTE — PROGRESS NOTES
Pulmonary and Critical Care Progress Note [facility]    Patient: Indira Reyes  1950   MR# [id number]   Acct# [de-identified]  03/26/20   8:03 AM  Referring Provider: Debo Lynn MD    Chief Complaint: Chronic respiratory failure  Interval history: Patient complains of wanting to eat and drink this morning. She is on BiPAP with O2 sats 91% to 92%. She reports that her ears feel full he can hear a bit better today than previous days. A Bumex drip is in place and the patient has a Waters catheter with clear urine. She is alert and oriented. Medications:   sodium chloride flush, 10 mL, Intravenous, 2 times per day    ipratropium-albuterol, 1 ampule, Inhalation, Q4H WA    methylPREDNISolone, 60 mg, Intravenous, Q8H    cefepime, 2 g, Intravenous, Q8H    insulin lispro, 0-6 Units, Subcutaneous, TID WC    insulin lispro, 0-3 Units, Subcutaneous, Nightly    levothyroxine, 200 mcg, Oral, Daily    losartan, 100 mg, Oral, Daily    PARoxetine, 20 mg, Oral, Daily    heparin (porcine), 5,000 Units, Subcutaneous, 3 times per day    vancomycin, 1,500 mg, Intravenous, Q12H    vancomycin (VANCOCIN) intermittent dosing (placeholder), , Other, RX Placeholder     Review of Systems: Review of Systems   Constitutional: Negative for chills and fever. HENT:        \"Ear fullness\"   Eyes: Negative. Respiratory: Positive for shortness of breath. Cardiovascular: Negative for chest pain and palpitations. Gastrointestinal: Negative. Genitourinary: Negative. Musculoskeletal: Negative. Skin: Negative for rash. Neurological: Negative. Negative for headaches. Psychiatric/Behavioral: Negative. Physical Exam:  Blood pressure (!) 184/55, pulse 87, temperature 97.4 °F (36.3 °C), temperature source Temporal, resp. rate 25, height 5' 5\" (1.651 m), weight (!) 567 lb 8 oz (257.4 kg), SpO2 92 %, not currently breastfeeding.     Intake/Output Summary (Last 24 hours) at 3/26/2020 0974  Last data BEART 9.8*  --   --   --  9.1*  --   --    AST  --   --  8  --   --   --   --    ALT  --   --  <5*  --   --   --   --    ALKPHOS  --   --  98  --   --   --   --    BILITOT  --   --  <0.2  --   --   --   --    MG  --   --   --   --   --   --  2.2   PHOS  --   --   --   --   --   --  3.2   TROPONINI  --    < > 0.01 0.02  --    < > <0.01   LACTA  --    < > 0.4* 0.5  --   --   --    INR  --   --  1.04  --   --   --   --     < > = values in this interval not displayed. No results for input(s): BC, LABGRAM, CULTRESP, BFCX in the last 72 hours. Radiograph:   My radiograph interpretation: No new    Echo:  TTE procedure:ECHO 2D W/DOPPLER/COLOR/CONTRAST.    Summary   Left ventricular size is moderately increased .   Mild concentric left ventricular hypertrophy.   Left ventricular ejection fraction is estimated at 64%. Pulmonary Assessment:    1. Acute on chronic respiratory failure with hypercapnia and hypoxia  2. Super morbid obesity  3. Right-sided lung infiltrate  4. Severe, chronic lymphedema  5. Congestive heart failure    Recommend:   · Wean off of BiPAP this morning to nasal cannula oxygen. Discussed with ADRY Mendez. · Continue BiPAP for all sleep. · Continue bronchodilator treatments  · Continue diuresis, currently is getting Bumex via drip  · Wean down IV steroids to 40 mg 3 times daily  · Continue cefepime and vancomycin  · PCR viral is pending  · Urine antigens for Legionella and strep pneumo ordered  · Sleep apnea with obesity hypoventilation syndrome is strongly suspected in this patient and should she deteriorate and require intubation she would very well eventually require a chronic tracheostomy. Electronically signed by Dionne Ye on 3/26/2020 at 8:03 AM  Physician's substantive portion:  Subjective: She is much more awake and alert today on BiPAP with adequate O2 saturations.   Objective: She has improved air movement on chest exam.  Assessment/recommendation: She clearly would

## 2020-03-27 VITALS
BODY MASS INDEX: 48.82 KG/M2 | DIASTOLIC BLOOD PRESSURE: 71 MMHG | OXYGEN SATURATION: 91 % | SYSTOLIC BLOOD PRESSURE: 147 MMHG | HEIGHT: 65 IN | HEART RATE: 107 BPM | TEMPERATURE: 97.1 F | RESPIRATION RATE: 18 BRPM | WEIGHT: 293 LBS

## 2020-03-27 PROBLEM — I27.20 PULMONARY HTN (HCC): Status: ACTIVE | Noted: 2020-03-27

## 2020-03-27 PROBLEM — I50.33 ACUTE ON CHRONIC DIASTOLIC (CONGESTIVE) HEART FAILURE (HCC): Status: ACTIVE | Noted: 2020-03-27

## 2020-03-27 PROBLEM — J96.22 ACUTE ON CHRONIC RESPIRATORY FAILURE WITH HYPOXIA AND HYPERCAPNIA (HCC): Status: ACTIVE | Noted: 2020-03-27

## 2020-03-27 PROBLEM — J96.21 ACUTE ON CHRONIC RESPIRATORY FAILURE WITH HYPOXIA AND HYPERCAPNIA (HCC): Status: ACTIVE | Noted: 2020-03-27

## 2020-03-27 LAB
ANION GAP SERPL CALCULATED.3IONS-SCNC: 12 MMOL/L (ref 7–19)
BUN BLDV-MCNC: 40 MG/DL (ref 8–23)
CALCIUM SERPL-MCNC: 9.7 MG/DL (ref 8.8–10.2)
CHLORIDE BLD-SCNC: 96 MMOL/L (ref 98–111)
CO2: 33 MMOL/L (ref 22–29)
CREAT SERPL-MCNC: 1 MG/DL (ref 0.5–0.9)
GFR NON-AFRICAN AMERICAN: 55
GLUCOSE BLD-MCNC: 234 MG/DL (ref 74–109)
GLUCOSE BLD-MCNC: 265 MG/DL (ref 70–99)
GLUCOSE BLD-MCNC: 270 MG/DL (ref 70–99)
GLUCOSE BLD-MCNC: 271 MG/DL (ref 70–99)
L. PNEUMOPHILA SEROGP 1 UR AG: NEGATIVE
PERFORMED ON: ABNORMAL
POTASSIUM REFLEX MAGNESIUM: 4.8 MMOL/L (ref 3.5–5)
SODIUM BLD-SCNC: 141 MMOL/L (ref 136–145)
TROPONIN: 0.01 NG/ML (ref 0–0.03)
TROPONIN: <0.01 NG/ML (ref 0–0.03)
TROPONIN: <0.01 NG/ML (ref 0–0.03)
VANCOMYCIN TROUGH: 24.7 UG/ML (ref 10–20)

## 2020-03-27 PROCEDURE — 94660 CPAP INITIATION&MGMT: CPT

## 2020-03-27 PROCEDURE — 84484 ASSAY OF TROPONIN QUANT: CPT

## 2020-03-27 PROCEDURE — 6360000002 HC RX W HCPCS: Performed by: HOSPITALIST

## 2020-03-27 PROCEDURE — 99231 SBSQ HOSP IP/OBS SF/LOW 25: CPT | Performed by: INTERNAL MEDICINE

## 2020-03-27 PROCEDURE — 6370000000 HC RX 637 (ALT 250 FOR IP): Performed by: HOSPITALIST

## 2020-03-27 PROCEDURE — 80048 BASIC METABOLIC PNL TOTAL CA: CPT

## 2020-03-27 PROCEDURE — 2580000003 HC RX 258: Performed by: HOSPITALIST

## 2020-03-27 PROCEDURE — 2700000000 HC OXYGEN THERAPY PER DAY

## 2020-03-27 PROCEDURE — 80202 ASSAY OF VANCOMYCIN: CPT

## 2020-03-27 PROCEDURE — 36415 COLL VENOUS BLD VENIPUNCTURE: CPT

## 2020-03-27 PROCEDURE — 94640 AIRWAY INHALATION TREATMENT: CPT

## 2020-03-27 PROCEDURE — 92610 EVALUATE SWALLOWING FUNCTION: CPT

## 2020-03-27 PROCEDURE — 82947 ASSAY GLUCOSE BLOOD QUANT: CPT

## 2020-03-27 RX ORDER — CEFDINIR 300 MG/1
300 CAPSULE ORAL 2 TIMES DAILY
Qty: 14 CAPSULE | Refills: 0 | Status: SHIPPED | OUTPATIENT
Start: 2020-03-27 | End: 2020-04-03

## 2020-03-27 RX ORDER — FUROSEMIDE 40 MG/1
40 TABLET ORAL 2 TIMES DAILY
Qty: 60 TABLET | Refills: 0 | Status: SHIPPED | OUTPATIENT
Start: 2020-03-27 | End: 2020-04-26

## 2020-03-27 RX ORDER — METHYLPREDNISOLONE 4 MG/1
TABLET ORAL
Qty: 1 KIT | Refills: 0 | Status: SHIPPED | OUTPATIENT
Start: 2020-03-27 | End: 2020-04-02

## 2020-03-27 RX ORDER — UREA 10 %
3 LOTION (ML) TOPICAL NIGHTLY PRN
Status: DISCONTINUED | OUTPATIENT
Start: 2020-03-27 | End: 2020-03-27 | Stop reason: HOSPADM

## 2020-03-27 RX ADMIN — METHYLPREDNISOLONE SODIUM SUCCINATE 40 MG: 40 INJECTION, POWDER, FOR SOLUTION INTRAMUSCULAR; INTRAVENOUS at 13:32

## 2020-03-27 RX ADMIN — IPRATROPIUM BROMIDE AND ALBUTEROL SULFATE 1 AMPULE: .5; 3 SOLUTION RESPIRATORY (INHALATION) at 14:56

## 2020-03-27 RX ADMIN — IPRATROPIUM BROMIDE AND ALBUTEROL SULFATE 1 AMPULE: .5; 3 SOLUTION RESPIRATORY (INHALATION) at 10:24

## 2020-03-27 RX ADMIN — LEVOTHYROXINE SODIUM 200 MCG: 100 TABLET ORAL at 06:33

## 2020-03-27 RX ADMIN — INSULIN LISPRO 3 UNITS: 100 INJECTION, SOLUTION INTRAVENOUS; SUBCUTANEOUS at 08:56

## 2020-03-27 RX ADMIN — Medication 2 G: at 08:55

## 2020-03-27 RX ADMIN — VANCOMYCIN HYDROCHLORIDE 1500 MG: 1 INJECTION, POWDER, LYOPHILIZED, FOR SOLUTION INTRAVENOUS at 04:14

## 2020-03-27 RX ADMIN — Medication 3 MG: at 00:30

## 2020-03-27 RX ADMIN — LOSARTAN POTASSIUM 100 MG: 100 TABLET, FILM COATED ORAL at 08:55

## 2020-03-27 RX ADMIN — METHYLPREDNISOLONE SODIUM SUCCINATE 40 MG: 40 INJECTION, POWDER, FOR SOLUTION INTRAMUSCULAR; INTRAVENOUS at 06:34

## 2020-03-27 RX ADMIN — PAROXETINE HYDROCHLORIDE 20 MG: 20 TABLET, FILM COATED ORAL at 08:55

## 2020-03-27 RX ADMIN — Medication 2 G: at 00:30

## 2020-03-27 RX ADMIN — INSULIN LISPRO 3 UNITS: 100 INJECTION, SOLUTION INTRAVENOUS; SUBCUTANEOUS at 13:32

## 2020-03-27 RX ADMIN — SODIUM CHLORIDE, PRESERVATIVE FREE 10 ML: 5 INJECTION INTRAVENOUS at 08:55

## 2020-03-27 RX ADMIN — IPRATROPIUM BROMIDE AND ALBUTEROL SULFATE 1 AMPULE: .5; 3 SOLUTION RESPIRATORY (INHALATION) at 06:46

## 2020-03-27 RX ADMIN — HEPARIN SODIUM 5000 UNITS: 5000 INJECTION INTRAVENOUS; SUBCUTANEOUS at 06:33

## 2020-03-27 ASSESSMENT — PAIN SCALES - GENERAL: PAINLEVEL_OUTOF10: 0

## 2020-03-27 ASSESSMENT — PAIN SCALES - WONG BAKER: WONGBAKER_NUMERICALRESPONSE: 0

## 2020-03-27 NOTE — CARE COORDINATION
Re: Merril Randal, 3/27/2020 2:35 PM    [facility] Protocols for Discharge from Hospital to [facility] (e.g., SNF, SWATI, Group Home, LTAC)    Protocols for the discharge of patients during the COVID-19 emergency to [address] [place] [address] have been developed and all discharge planning will be done in partnership with the receiving facility. Per standard practices, no patients with progressing respiratory infections will be discharged from the hospital until stable and ready. Our Patient Dale Arevalo has been assessed by multiple providers and is determined to be in Category 1 which is described below:    Category 1: Patients with no clinical concern for COVID-19: Acceptable for transfer to congregate facility for discharge (no change in standard process).     Gwen Ahn   Director of Care Management

## 2020-03-27 NOTE — PROGRESS NOTES
Vacomycin trough 24.7. Patient pending DC to [facility].   Vacomycin discontinued

## 2020-03-27 NOTE — PROGRESS NOTES
2 g at 03/27/20 0855    labetalol (NORMODYNE;TRANDATE) injection 20 mg  20 mg Intravenous Q4H PRN Anay Sousa MD        sodium chloride flush 0.9 % injection 10 mL  10 mL Intravenous 2 times per day Anay Sousa MD   10 mL at 03/27/20 0855    sodium chloride flush 0.9 % injection 10 mL  10 mL Intravenous PRN Anay Sousa MD        acetaminophen (TYLENOL) tablet 650 mg  650 mg Oral Q6H PRN Anay Sousa MD        Or   Hay acetaminophen (TYLENOL) suppository 650 mg  650 mg Rectal Q6H PRN Anay Sousa MD        magnesium hydroxide (MILK OF MAGNESIA) 400 MG/5ML suspension 30 mL  30 mL Oral Daily PRN Anay Sousa MD        promethazine (PHENERGAN) tablet 12.5 mg  12.5 mg Oral Q6H PRN Anay Sousa MD        Or    ondansetron (ZOFRAN) injection 4 mg  4 mg Intravenous Q6H PRN Anay Sousa MD        ipratropium-albuterol (DUONEB) nebulizer solution 1 ampule  1 ampule Inhalation Q4H WA Anay Sousa MD   1 ampule at 03/27/20 1024    insulin lispro (HUMALOG) injection vial 0-6 Units  0-6 Units Subcutaneous TID WC Anay Sousa MD   3 Units at 03/27/20 0856    insulin lispro (HUMALOG) injection vial 0-3 Units  0-3 Units Subcutaneous Nightly Anay Sousa MD   1 Units at 03/26/20 2219    glucose (GLUTOSE) 40 % oral gel 15 g  15 g Oral PRN Anay Sousa MD        dextrose 50 % IV solution  12.5 g Intravenous PRN Anay Sousa MD        glucagon (rDNA) injection 1 mg  1 mg Intramuscular PRKOJO Sousa MD        dextrose 5 % solution  100 mL/hr Intravenous CARMEN Sousa MD        levothyroxine (SYNTHROID) tablet 200 mcg  200 mcg Oral Daily Anay Sousa MD   200 mcg at 03/27/20 6633    losartan (COZAAR) tablet 100 mg  100 mg Oral Daily Anay Sousa MD   100 mg at 03/27/20 0855    PARoxetine (PAXIL) tablet 20 mg  20 mg Oral Daily Burnie Saucer, MD   20 mg at 03/27/20 0855    heparin (porcine) injection 5,000 Units  5,000 Units Subcutaneous 3 times per day Anay Sousa MD   5,000 Units at 03/27/20 6292    vancomycin (VANCOCIN) 1,500 mg in dextrose 5 % 500 mL IVPB  1,500 mg Intravenous Q12H Jonathon Weaver MD   Stopped at 03/27/20 7620    vancomycin (VANCOCIN) intermittent dosing (placeholder)   Other RX Placeholder Jonathon Weaver MD        bumetanide (BUMEX) 12.5 mg in sodium chloride 0.9 % 125 mL infusion  0.5 mg/hr Intravenous Continuous Jonathon Weaver MD 5 mL/hr at 03/26/20 1156 0.5 mg/hr at 03/26/20 1156    hydrALAZINE (APRESOLINE) injection 10 mg  10 mg Intravenous Q6H PRN Jonathon Weaver MD   10 mg at 03/26/20 0740       Past Medical History:  Past Medical History:   Diagnosis Date    Cellulitis     Gout     Hyperlipidemia     Hypertension     Hypothyroidism     Lymphadenitis     Morbid obesity ([facility] [address]     Type II or unspecified type diabetes mellitus without mention of complication, not stated as uncontrolled        Past Surgical History:  Past Surgical History:   Procedure Laterality Date    CATARACT REMOVAL      10/30/13 left.  12/31/13 right    CHEST SURGERY      left    CHOLECYSTECTOMY      CYST REMOVAL      RECTAL SURGERY      TONSILLECTOMY         Family History  Family History   Problem Relation Age of Onset    Heart Disease Mother     Diabetes Father     Heart Disease Father     Cancer Maternal Aunt         breast    Heart Disease Maternal Aunt     Cancer Sister         breast       Social History  Social History     Socioeconomic History    Marital status: Single     Spouse name: Not on file    Number of children: Not on file    Years of education: Not on file    Highest education level: Not on file   Occupational History    Occupation: retired   Social Needs    Financial resource strain: Not on file    Food insecurity     Worry: Not on file     Inability: Not on file   [employer] needs     Medical: Not on file     Non-medical: Not on file   Tobacco Use    Smoking status: Never Smoker    Smokeless tobacco: Never Used   Substance and Sexual Activity    Alcohol use: No    Drug use: No    Sexual activity: Not on file   Lifestyle    Physical activity     Days per week: Not on file     Minutes per session: Not on file    Stress: Not on file   Relationships    Social connections     Talks on phone: Not on file     Gets together: Not on file     Attends Orthodox service: Not on file     Active member of club or organization: Not on file     Attends meetings of clubs or organizations: Not on file     Relationship status: Not on file    Intimate partner violence     Fear of current or ex partner: Not on file     Emotionally abused: Not on file     Physically abused: Not on file     Forced sexual activity: Not on file   Other Topics Concern    Not on file   Social History Narrative    Not on file         Review of Systems:  History obtained from unobtainable from patient due to mental status          Objective:  Patient Vitals for the past 24 hrs:   BP Temp Temp src Pulse Resp SpO2 Weight   03/27/20 0821 -- -- -- -- -- 90 % --   03/27/20 0821 (!) 147/71 97.1 °F (36.2 °C) Temporal 102 18 (!) 85 % --   03/27/20 0616 (!) 142/80 96.7 °F (35.9 °C) Temporal 94 18 92 % --   03/27/20 0529 -- -- -- -- -- -- (!) 566 lb 3 oz (256.8 kg)   03/27/20 0028 -- 97.9 °F (36.6 °C) Temporal 98 28 91 % --   03/26/20 2213 -- -- -- 104 -- -- --   03/26/20 1943 (!) 153/69 96.8 °F (36 °C) Temporal 104 (!) 32 94 % --   03/26/20 1515 -- -- -- -- -- 95 % --   03/26/20 1200 133/83 97.3 °F (36.3 °C) Temporal 89 22 90 % --       Intake/Output Summary (Last 24 hours) at 3/27/2020 1125  Last data filed at 3/27/2020 0818  Gross per 24 hour   Intake 1178 ml   Output 2635 ml   Net -1457 ml     General: Lethargic on the BiPAP  Chest:  clear to auscultation bilaterally without respiratory distress  CVS: regular rate and rhythm  Abdominal: soft, nontender, normal bowel sounds  Extremities: Bilateral lower and upper extremity lymphedema  Skin: Bilateral lower extremity severe chronic stasis changes      Labs:  BMP:   Recent Labs     03/25/20  1150 03/25/20  1556 03/26/20  0154 03/27/20  0155     --  146* 141   K 5.1* 5.1 5.2* 4.8     --  98 96*   CO2 34*  --  30* 33*   PHOS  --   --  3.2  --    BUN 36*  --  37* 40*   CREATININE 1.2*  --  1.0* 1.0*   CALCIUM 9.9  --  10.0 9.7     CBC:   Recent Labs     03/25/20  1150   WBC 13.0*   HGB 10.8*   HCT 38.0   .0*        LIVER PROFILE:   Recent Labs     03/25/20  1150   AST 8   ALT <5*   BILITOT <0.2   ALKPHOS 98     PT/INR:   Recent Labs     03/25/20  1150   PROTIME 13.5   INR 1.04     APTT:   Recent Labs     03/25/20  1150   APTT 29.4     BNP:  No results for input(s): BNP in the last 72 hours. Ionized Calcium:No results for input(s): IONCA in the last 72 hours. Magnesium:  Recent Labs     03/26/20  0154   MG 2.2     Phosphorus:  Recent Labs     03/26/20  0154   PHOS 3.2     HgbA1C: No results for input(s): LABA1C in the last 72 hours. Hepatic:   Recent Labs     03/25/20  1150   ALKPHOS 98   ALT <5*   AST 8   PROT 7.6   BILITOT <0.2   LABALBU 3.5     Lactic Acid:   Recent Labs     03/25/20  1445   LACTA 0.5     Troponin: No results for input(s): CKTOTAL, CKMB, TROPONINT in the last 72 hours. ABGs: No results for input(s): PH, PCO2, PO2, HCO3, O2SAT in the last 72 hours. CRP:  No results for input(s): CRP in the last 72 hours. Sed Rate:  No results for input(s): SEDRATE in the last 72 hours. Cultures:   No results for input(s): CULTURE in the last 72 hours. Recent Labs     03/25/20  1150 03/25/20  1205   BC No Growth to date. Any change in status will be called. --    BLOODCULT2  --  No Growth to date. Any change in status will be called. No results for input(s): CXSURG in the last 72 hours. Radiology reports as per the Radiologist  Radiology: Xr Chest Portable    Result Date: 3/25/2020  Examination. XR CHEST PORTABLE 3/25/2020 10:15 AM History: Shortness of breath.  A single frontal portable semiupright view of the chest is of very limited diagnostic value due to the body habitus and suboptimal exposure. There is a right upper and right lower lung infiltrate. The pulmonary vascular congestion. Lung bases are not well-visualized. Possibility of pleural effusion is not excluded. There is moderate cardiomegaly. No obvious bony abnormality. A very limited diagnostic study due to body habitus and suboptimal exposure. The pulmonary vascular congestion and cardiomegaly. The right upper and lower lung consolidation/infiltrate. Signed by Dr Riky Roberts on 3/25/2020 11:21 AM       Assessment   Chronic kidney disease stage III  Bilateral extreme lymphedema  Acute on chronic hypercapnic and acute hypoxic respiratory failure  Hypertension  Pulmonary hypertension  Diabetes type 2 with long-term insulin use  Hyperkalemia  Hypernatremia  Morbid obesity  Vitamin D deficiency  Secondary hyperparathyroidism  Anemia-iron deficiency    Plan:  Patient started on Bumex drip, will need to monitor very closely given her multiple medical comorbidities and diffuse chronic lymphedema, would have low threshold for decreased effective diuresis where her BUN and creatinine to increase as her BNP is the lowest we have on record  Discussed with nursing, patient, hospice  Vitamin D  Continue to monitor labs closely on Bumex drip  Monitor blood pressure trends, disposition with hospice appears reasonable given overall well multiple medical comorbidities  Thank you for the consult, we appreciate the opportunity to provide care to your patients. Feel free to contact me if I can be of any further assistance.       Vane Anton MD  03/27/20  11:25 AM

## 2020-03-27 NOTE — PROGRESS NOTES
Pharmacy Vancomycin Consult     Vancomycin Day: 3  Current Dosin mg IV Q 12 hours    Temp max:  97.9    Recent Labs     20  0154 20  0155   BUN 37* 40*       Recent Labs     20  0154 20  0155   CREATININE 1.0* 1.0*       Recent Labs     20  1150   WBC 13.0*         Intake/Output Summary (Last 24 hours) at 3/27/2020 1556  Last data filed at 3/27/2020 1556  Gross per 24 hour   Intake 1538 ml   Output 4960 ml   Net -3422 ml       Culture Date Source Results   20 Blood x 2 No growth                 Ht Readings from Last 1 Encounters:   20 5' 5\" (1.651 m)        Wt Readings from Last 1 Encounters:   20 (!) 566 lb 3 oz (256.8 kg)         Body mass index is 94.22 kg/m². Estimated Creatinine Clearance: 112 mL/min (A) (based on SCr of 1 mg/dL (H)).     Trough: 24.7    Assessment/Plan: Decrease Vancomycin to 1500 mg IV Q 18 hours    Electronically signed by Kulwinder Marie RPH on 3/27/2020 at 3:56 PM

## 2020-03-27 NOTE — PROGRESS NOTES
Speech Language Pathology  Facility/Department: [facility]/VAS/MED   CLINICAL BEDSIDE SWALLOW EVALUATION    NAME: Nilson Merchant  : 1950  MRN: [id number]    ADMISSION DATE: 3/25/2020  ADMITTING DIAGNOSIS: has Hyperlipidemia; Hypertension; Hypothyroidism; Morbid obesity with BMI of 70 and over, adult ([address] Gout; Cellulitis; Lymphedema of lower extremity; Closed displaced fracture of navicular bone of left foot; Failure to thrive in adult; Leukocytosis; Acute cystitis with hematuria; Type 2 diabetes mellitus with diabetic arthropathy, with long-term current use of insulin (HCC); CKD (chronic kidney disease) stage 3, GFR 30-59 ml/min ([address] Anemia of unknown etiology; CHF (congestive heart failure) ([address] Ambulatory dysfunction; Hypoxia; Positive D dimer; Acute respiratory failure with hypoxia and hypercapnia ([address] Acute bronchitis due to other specified organisms; HCAP (healthcare-associated pneumonia); Intractable nausea and vomiting; Suicidal ideation; SBO (small bowel obstruction) ([address] Respiratory failure ([address] Acute on chronic diastolic (congestive) heart failure ([address] Acute on chronic respiratory failure with hypoxia and hypercapnia ([address] and Pulmonary HTN ([address] on their problem list.    Date of Eval: 3/27/2020  Evaluating Therapist: Zach Pacheco    Reason for Referral  Nilson Merchant was referred for a bedside swallow evaluation to assess the efficiency of her swallow function, identify signs and symptoms of aspiration and make recommendations regarding safe dietary consistencies, effective compensatory strategies, and safe eating environment. Impression  Assessed patient's swallowing function. Patient exhibits inconsistently fast oral transit and suspected swallow delay with thin liquids and sluggish laryngeal elevation for swallow airway protection.  Even so, no outward S/S penetration/aspiration was observed with any puree consistency trial, regular solid consistency trial, or thin H2O trial administered during evaluation this date. It is noted however that patient did appear SOB while chewing regular solid consistency. At this time, would recommend continuation regular solid consistency to promote increased menu options. Thin liquids. Assist during meals. Do recommend meds whole in pudding/applesauce. Patient may benefit from nutritional supplements as PO intake has been reported as low. Will continue to follow. Thank you for this consult. Treatment Plan  Requires SLP Intervention: Yes     Recommended Diet and Intervention  Diet Solids Recommendation: Regular solid   Liquid Consistency Recommendation: Thin   Recommended Form of Meds: Meds whole in puree as able  Therapeutic Interventions: Patient/Family education;Diet tolerance monitoring     Compensatory Swallowing Strategies  Compensatory Swallowing Strategies: Upright as possible for all oral intake;Assist during meals;Small bites/sips;Eat/Feed slowly; Alternate solids and liquids; Remain upright for 30-45 minutes after meals     Treatment/Goals  Timeframe for Short-term Goals: 1x/day for 3 days   Goal 1: Patient will tolerate regular solid consistency and thin liquids with min S/S penetration/aspiration during PO intake. Goal 2: Patient staff will follow swallow safety recommendations to decrease risk of penetration/aspiration during PO intake. General  Chart Reviewed: Yes  Behavior/Cognition: Alert; Cooperative  O2 Device: Nasal Cannula   Communication Observation: (SLP ranked functional intelligibility of speech for unfamiliar listeners at 100% without background noise present.)  Follows Directions: Simple   Patient Positioning: Patient refused 90 degree positioning in bed. Consistencies Administered: Dysphagia Pureed (Dysphagia I); Regular solid; Thin - straw     Assessed patient's swallowing function with the following observations noted:     Oral Phase Dysfunction  Oral Phase: Exceptions  Oral Phase Dysfunction  Suspected Premature Bolus Loss: Patient exhibited inconsistently fast oral transit of thin H2O trials presented independently via straw. Oral Phase - Comment: Patient exhibited functional oral prep and transit of puree consistency trials and regular solid consistency trials, all presented by SLP, with timing of oral transit primarily measuring 1-2 seconds in length and with min oral cavity residue (of regular solid consistency) noted post swallows; all oral cavity residue cleared from the mouth with additional dry swallows. Indicators of Pharyngeal Phase Dysfunction  Pharyngeal Phase: Exceptions  Indicators of Pharyngeal Phase Dysfunction  Suspected Swallow Delay: Puree consistency: Thin - straw Suspect secondary to oral transit times.)  Decreased Laryngeal Elevation: (Patient exhibited sluggish laryngeal elevation for swallow airway protection.)  Pharyngeal: No outward S/S penetration/aspiration was observed with any puree consistency trial, regular solid consistency trial, or thin H2O trial administered during evaluation this date. It is noted however that patient did appear SOB while chewing regular solid consistency. At this time, would recommend continuation regular solid consistency to promote increased menu options. Thin liquids. Assist during meals. Do recommend meds whole in pudding/applesauce. Patient may benefit from nutritional supplements as PO intake has been reported low. Will continue to follow.     Electronically signed by GUNJAN Kothari on 3/27/2020 at 9:44 AM

## 2020-03-27 NOTE — PROGRESS NOTES
Report called to Selene Ag at [facility] and [address] [place] [address]. Patient will be transfer by EMS. Waters DC/IV access DC/Tele DC.

## 2020-03-27 NOTE — DISCHARGE SUMMARY
Visit Number     [id number]       Interpreting          Brenda Rodriguez MD                                   Physician   Date of Birth    1950      Referring Physician   Urmila Perez   Accession Number [id number]       [address] [place] [address]  Procedure Type of Study:   Veins:Lower Extremities DVT Study, VL LOWER EXTREMITY BILATERAL VENOUS  DUPLEX . Indications for Study:Edema, bilateral lower extremity and Shortness of breath. Impression   Essentially undiagnostic study. Pt. is \"super obese\" and vessels only  visualized in the bilateral groins. GSV only partially seen bilaterally. No dvt seen in the groins at this time. Signature   ----------------------------------------------------------------  Electronically signed by Brenda Rodriguez MD(Interpreting  physician) on 03/26/2020 12:39 PM  ----------------------------------------------------------------  Velocities are measured in cm/s ; Diameters are measured in mm Right Lower Extremities DVT Study Measurements Right 2D Measurements +------------------------------------+----------+---------------+----------+ ! Location                            ! Visualized! Compressibility! Thrombosis! +------------------------------------+----------+---------------+----------+ ! Sapheno Femoral Junction            ! Partial   !Yes            ! None      ! +------------------------------------+----------+---------------+----------+ ! Common Femoral                      !Partial   !Yes            ! None      ! +------------------------------------+----------+---------------+----------+ ! Prox Femoral                        !Partial   !Yes            ! None      ! +------------------------------------+----------+---------------+----------+ ! Mid Femoral                         !No        !               !          ! +------------------------------------+----------+---------------+----------+ ! Dist Femoral                        !No        !               !          ! !Partial   !Yes            ! None      ! +------------------------------------+----------+---------------+----------+ ! Mid Femoral                         !No        !               !          ! +------------------------------------+----------+---------------+----------+ ! Dist Femoral                        !No        !               !          ! +------------------------------------+----------+---------------+----------+ ! Deep Femoral                        !No        !               !          ! +------------------------------------+----------+---------------+----------+ ! Popliteal                           !No        !               !          ! +------------------------------------+----------+---------------+----------+ ! SSV                                 ! No        !               !          ! +------------------------------------+----------+---------------+----------+ ! Gastroc                             ! No        !               !          ! +------------------------------------+----------+---------------+----------+ ! PTV                                 ! No        !               !          ! +------------------------------------+----------+---------------+----------+ ! GSV                                 ! Partial   !Yes            ! None      ! +------------------------------------+----------+---------------+----------+ ! ATV                                 ! No        !               !          ! +------------------------------------+----------+---------------+----------+ ! Peroneal                            !No        !               !          ! +------------------------------------+----------+---------------+----------+      Pertinent Labs:   CBC:   Recent Labs     03/25/20  1150   WBC 13.0*   HGB 10.8*        BMP:    Recent Labs     03/25/20  1150 03/25/20  1556 03/26/20  0154 03/27/20  0155     --  146* 141   K 5.1* 5.1 5.2* 4.8     --  98 96*   CO2 34*  --  30* 33*   BUN 36*  --  37* 40*   CREATININE 1.2*  --  1.0* 1.0*   GLUCOSE 110*  --  180* 234*     INR:   Recent Labs     03/25/20  1150   INR 1.04     ABGs:No results for input(s): PH, PO2, PCO2, HCO3, BE, O2SAT in the last 72 hours. Lactic Acid:  Recent Labs     03/25/20  1445   LACTA 0.5       Procedures: None  Hospital Course: 79-year-old female who is morbidly obese with chronic diastolic dysfunction and acute on chronic respiratory failure on home oxygen in the nursing home. Patient has a poor quality of life and is bedbound secondary to morbid obesity. Patient presented from nursing home for dyspnea found to have possible pneumonia with hypoxic and hypercapnic respiratory failure requiring BiPAP and Bumex drip on admission. Patient has been transitioned to routine medical floors still on Bumex drip and IV antibiotics. I had a discussion with the patient and she is AAO x3 and she wishes to be DNR/DNI with a focus on comfort care/hospice. Palliative care evaluation has been ordered. Hospice consult has been ordered. BiPAP PRN for comfort. Patient has been evaluated by hospice and she is agreeable for hospice placement. Patient is to be discharged back to nursing home with hospice to follow.     Physical Exam:  Vitals: BP (!) 147/71   Pulse 107   Temp 97.1 °F (36.2 °C) (Temporal)   Resp 18   Ht 5' 5\" (1.651 m)   Wt (!) 566 lb 3 oz (256.8 kg)   SpO2 91%   BMI 94.22 kg/m²   24HR INTAKE/OUTPUT:      Intake/Output Summary (Last 24 hours) at 3/27/2020 1414  Last data filed at 3/27/2020 1336  Gross per 24 hour   Intake 1538 ml   Output 2460 ml   Net -922 ml     General appearance: AAOx3, morbidly obese  HEENT: AT/NC  Lungs: Bilateral air entry, poor inspiratory effort, mild bilateral wheezing, no crackles or rhonchi appreciated  Heart: regular rate and rhythm, S1, S2 normal, no murmur, click, rub or gallop  Abdomen: soft, non-tender; bowel sounds normal; no masses,  no organomegaly  Extremities: Chronic lymphedema bilateral lower by mouth as needed Indications: Itching of Feet              levothyroxine (SYNTHROID) 200 MCG tablet  TAKE 1 TABLET BY MOUTH ONCE DAILY. losartan (COZAAR) 100 MG tablet  TAKE 1 TABLET IN THE MORNING FOR HIGH BLOOD PRESSURE AND TO PROTECT KIDNEYS.             magnesium hydroxide (MILK OF MAGNESIA) 400 MG/5ML suspension  Take 5 mLs by mouth daily as needed for Constipation             methylPREDNISolone (MEDROL DOSEPACK) 4 MG tablet  Take by mouth. nystatin (MYCOSTATIN) POWD powder  Apply topically 2 times daily             ondansetron (ZOFRAN ODT) 4 MG disintegrating tablet  Take 1 tablet by mouth every 8 hours as needed for Nausea or Vomiting             ONETOUCH VERIO strip  TEST BLOOD SUGAR 3 TIMES A DAY. E11.9             PARoxetine (PAXIL) 20 MG tablet  TAKE 1 TABLET BY MOUTH IN THE MORNING. senna (SENOKOT) 8.6 MG tablet  Take 1 tablet by mouth as needed for Constipation Indications: Constipation             silver sulfADIAZINE (SILVADENE) 1 % cream  Apply topically daily Indications: Infection Under the Skin, Apply to LLE RLE URE Q day Apply topically daily. Skin Protectants, Misc. (HYDROCERIN) CREA cream  Apply topically 2 times daily             TRUEPLUS LANCETS 28G MISC  USE AS DIRECTED             ULTICARE INSULIN SYRINGE 29G X 1/2\" 1 ML MISC  USE AS DIRECTED             ULTICARE INSULIN SYRINGE 29G X 1/2\" 1 ML MISC  USE AS DIRECTED                 Discharge Instructions: Follow up with Leatha Mccurdy MD in 7 days. Take medications as directed. Resume activity as tolerated. Diet: DIET CARDIAC; Disposition: Patient is medically stable and will be discharged Skilled nursing facility with [facility]. Time spent on discharge 35 minutes.     Signed:  Jean Dover MD 3/27/2020 2:14 PM

## 2020-03-27 NOTE — PROGRESS NOTES
Cardiology Daily Note Moustapha Champion MD      Patient:  Mele Williamson  [id number]    Patient Active Problem List    Diagnosis Date Noted    Acute on chronic diastolic (congestive) heart failure ([address] 03/27/2020     Priority: Low    Acute on chronic respiratory failure with hypoxia and hypercapnia (HCC) 03/27/2020     Priority: Low    Pulmonary HTN ([address] 03/27/2020     Priority: Low    Respiratory failure ([address] 03/25/2020     Priority: Low    SBO (small bowel obstruction) (HCC)      Priority: Low    Intractable nausea and vomiting 06/27/2019     Priority: Low    Suicidal ideation 06/27/2019     Priority: Low    HCAP (healthcare-associated pneumonia) 06/25/2019     Priority: Low    Acute bronchitis due to other specified organisms 03/08/2019     Priority: Low    Acute respiratory failure with hypoxia and hypercapnia (HCC)      Priority: Low    Hypoxia 03/07/2019     Priority: Low    Positive D dimer 03/07/2019     Priority: Low    Ambulatory dysfunction      Priority: Low    CKD (chronic kidney disease) stage 3, GFR 30-59 ml/min ([facility] 06/01/2017     Priority: Low    Anemia of unknown etiology 06/01/2017     Priority: Low    CHF (congestive heart failure) ([facility] 06/01/2017     Priority: Low    Closed displaced fracture of navicular bone of left foot 05/31/2017     Priority: Low    Failure to thrive in adult 05/31/2017     Priority: Low    Leukocytosis 05/31/2017     Priority: Low    Acute cystitis with hematuria 05/31/2017     Priority: Low    Type 2 diabetes mellitus with diabetic arthropathy, with long-term current use of insulin ([facility] 05/31/2017     Priority: Low    Lymphedema of lower extremity 10/16/2012     Priority: Low    Hyperlipidemia      Priority: Low    Hypertension      Priority: Low    Hypothyroidism      Priority: Low    Morbid obesity with BMI of 70 and over, adult ([facility]      Priority: Low    Gout      Priority: Low    Cellulitis      Priority: Low       Admit Date: 3/25/2020    Admission Problem List: Present on Admission:   Respiratory failure ([facility]   Morbid obesity with BMI of 70 and over, adult ([facility]   Lymphedema of lower extremity   CKD (chronic kidney disease) stage 3, GFR 30-59 ml/min [facility]   Acute on chronic diastolic (congestive) heart failure (HCC)   Acute on chronic respiratory failure with hypoxia and hypercapnia ([facility]   Type 2 diabetes mellitus with diabetic arthropathy, with long-term current use of insulin ([facility])   Pulmonary HTN ([facility]   Hypothyroidism   Hypertension   Hyperlipidemia      Cardiac Specific Data:  Specialty Problems        Cardiology Problems    Hyperlipidemia        Hypertension        CHF (congestive heart failure) ([facility])        Acute on chronic diastolic (congestive) heart failure ([facility])        Pulmonary HTN ([facility]              Subjective:  Ms. Roma Flores seen today resting comfortably dyspnea somewhat improved denies chest pain all troponins negative. Blood pressure 147/71. Patient indicates she is discussing possible hospice. Objective:   BP (!) 147/71   Pulse 102   Temp 97.1 °F (36.2 °C) (Temporal)   Resp 18   Ht 5' 5\" (1.651 m)   Wt (!) 566 lb 3 oz (256.8 kg)   SpO2 90%   BMI 94.22 kg/m²       Intake/Output Summary (Last 24 hours) at 3/27/2020 0954  Last data filed at 3/27/2020 0818  Gross per 24 hour   Intake 1178 ml   Output 2860 ml   Net -1682 ml       Prior to Admission medications    Medication Sig Start Date End Date Taking?  Authorizing Provider   nystatin (MYCOSTATIN) POWD powder Apply topically 2 times daily   Yes Historical Provider, MD   albuterol sulfate  (90 Base) MCG/ACT inhaler Inhale 2 puffs into the lungs every 6 hours as needed for Wheezing   Yes Historical Provider, MD   acetaminophen (TYLENOL) 500 MG tablet Take 500 mg by mouth every 4 hours as needed for Pain   Yes Historical Provider, MD   silver sulfADIAZINE (SILVADENE) 1 % cream Apply topically daily Indications: Infection Under the Skin, Apply to LLE RLE URE Q day Apply topically daily. Yes Historical Provider, MD   levocetirizine (XYZAL) 5 MG tablet Take 5 mg by mouth as needed Indications: Itching of Feet    Yes Historical Provider, MD   ascorbic acid (VITAMIN C) 500 MG tablet Take 500 mg by mouth daily   Yes Historical Provider, MD   diclofenac sodium 1 % GEL Apply 2 g topically 2 times daily Indications: Pain, Left knee, Right shoulder   Yes Historical Provider, MD   insulin lispro (HUMALOG) 100 UNIT/ML injection vial Inject 15 Units into the skin 3 times daily (before meals)    Yes Historical Provider, MD   ondansetron (ZOFRAN ODT) 4 MG disintegrating tablet Take 1 tablet by mouth every 8 hours as needed for Nausea or Vomiting 3/1/18  Yes Edy Thapa MD   TRUEPLUS LANCETS 28G MISC USE AS DIRECTED 7/10/17  Yes Rhea Buchanan MD   Skin Protectants, Misc. (HYDROCERIN) CREA cream Apply topically 2 times daily 6/2/17  Yes Za Dexter MD   ferrous sulfate (RHIANNON-KAN) 325 (65 FE) MG tablet Take 1 tablet by mouth daily (with breakfast) 6/2/17  Yes GERALDINE Velásquez CNP   acetaminophen (TYLENOL) 500 MG tablet Take 500 mg by mouth every morning   Yes Historical Provider, MD   docusate sodium (COLACE) 100 MG capsule Take 1 capsule by mouth 2 times daily 5/30/17  Yes GERALDINE Qureshi   ONEUCH VERIO strip TEST BLOOD SUGAR 3 TIMES A DAY. E11.9 5/24/17  Yes Rhea Buhcanan MD   diltiazem (CARDIZEM CD) 180 MG extended release capsule TAKE (2) CAPSULES BY MOUTH ONCE DAILY. 5/4/17  Yes Rhea Buchanan MD   atorvastatin (LIPITOR) 10 MG tablet TAKE 1 TABLET BY MOUTH IN THE EVENING FOR HIGH CHOLESTEROL. 3/21/17  Yes Rhea Buchanan MD   furosemide (LASIX) 40 MG tablet TAKE 1 TABLET BY MOUTH ONCE DAILY.  3/16/17  Yes GERALDINE Joyce CNP   losartan (COZAAR) 100 MG tablet TAKE 1 TABLET IN THE MORNING FOR HIGH BLOOD PRESSURE AND TO PROTECT KIDNEYS. 2/24/17  Yes Rhea Buchanan MD   ULTICARE INSULIN SYRINGE 29G X 1/2\" 1 ML MISC USE AS DIRECTED 1/26/17 Yes Roxy Talbot MD   PARoxetine (PAXIL) 20 MG tablet TAKE 1 TABLET BY MOUTH IN THE MORNING. 11/14/16  Yes Roxy Talbot MD   LANTUS 100 UNIT/ML injection vial INJECT 10 UNITS IN THE MORNING AND 60 UNITS IN THE EVENING  Patient taking differently: INJECT 10 UNITS IN THE MORNING AND 50 UNITS IN THE EVENING 10/3/16  Yes Roxy Talbot MD   levothyroxine (SYNTHROID) 200 MCG tablet TAKE 1 TABLET BY MOUTH ONCE DAILY. 9/21/16  Yes Roxy Talbot MD   BD INSULIN SYRINGE ULTRAFINE 31G X 5/16\" 0.3 ML MISC USE AS DIRECTED 3 TIMES A DAY. 6/9/16  Yes Roxy Talbot MD   Blood Glucose Monitoring Suppl ([facility]) W/DEVICE KIT 1 Units by Does not apply route as needed 12/17/15  Yes Roxy Talbot MD   [place] INSULIN SYRINGE 29G X 1/2\" 1 ML MISC USE AS DIRECTED 2/25/15  Yes Roxy Talbot MD   BD ULTRA-FINE PEN NEEDLES 29G X 12.7MM MISC USE AS DIRECTED 7/30/13  Yes Roxy Talbot MD   senna (SENOKOT) 8.6 MG tablet Take 1 tablet by mouth as needed for Constipation Indications: Constipation    Historical Provider, MD   magnesium hydroxide (MILK OF MAGNESIA) 400 MG/5ML suspension Take 5 mLs by mouth daily as needed for Constipation    Historical Provider, MD   allopurinol (ZYLOPRIM) 300 MG tablet TAKE 1 TABLET BY MOUTH ONCE DAILY. 10/3/16   Roxy Talbot MD   diltiazem (DILACOR XR) 180 MG XR capsule TAKE 2 CAPSULES DAILY.  1/16/14 7/1/14  Roxy Talbot MD        methylPREDNISolone  40 mg Intravenous Q8H    cefepime  2 g Intravenous Q8H    sodium chloride flush  10 mL Intravenous 2 times per day    ipratropium-albuterol  1 ampule Inhalation Q4H WA    insulin lispro  0-6 Units Subcutaneous TID WC    insulin lispro  0-3 Units Subcutaneous Nightly    levothyroxine  200 mcg Oral Daily    losartan  100 mg Oral Daily    PARoxetine  20 mg Oral Daily    heparin (porcine)  5,000 Units Subcutaneous 3 times per day    vancomycin  1,500 mg Intravenous Q12H    vancomycin (VANCOCIN) intermittent dosing !Partial   !Yes            ! None      ! +------------------------------------+----------+---------------+----------+ ! Prox Femoral                        !Partial   !Yes            ! None      ! +------------------------------------+----------+---------------+----------+ ! Mid Femoral                         !No        !               !          ! +------------------------------------+----------+---------------+----------+ ! Dist Femoral                        !No        !               !          ! +------------------------------------+----------+---------------+----------+ ! Deep Femoral                        !No        !               !          ! +------------------------------------+----------+---------------+----------+ ! Popliteal                           !No        !               !          ! +------------------------------------+----------+---------------+----------+ ! SSV                                 ! No        !               !          ! +------------------------------------+----------+---------------+----------+ ! Gastroc                             ! No        !               !          ! +------------------------------------+----------+---------------+----------+ ! PTV                                 ! No        !               !          ! +------------------------------------+----------+---------------+----------+ ! GSV                                 ! Partial   !Yes            ! None      ! +------------------------------------+----------+---------------+----------+ ! ATV                                 ! No        !               !          ! +------------------------------------+----------+---------------+----------+ ! Peroneal                            !No        !               !          ! +------------------------------------+----------+---------------+----------+        Assessment:  1.  Complaints of worsening dyspnea consistent with decompensated congestive heart

## 2020-03-27 NOTE — PROGRESS NOTES
[facility]        H[facility]ist Progress Note  3/27/2020 9:26 AM  Subjective:   Admit Date: 3/25/2020  PCP: Rebecca Lucas MD    Chief Complaint: Dyspnea    Subjective: Patient seen and examined at bedside with nasal cannula on eating breakfast.  Tachypneic. Short of breath. Denies chest pain. Cumulative Hospital History: 65-year-old female who is morbidly obese with chronic diastolic dysfunction and acute on chronic respiratory failure on home oxygen in the nursing home. Patient has a poor quality of life and is bedbound secondary to morbid obesity. Patient presented from nursing home for dyspnea found to have possible pneumonia with hypoxic and hypercapnic respiratory failure requiring BiPAP and Bumex drip on admission. Patient has been transitioned to routine medical floors still on Bumex drip and IV antibiotics. I had a discussion with the patient and she is AAO x3 and she wishes to be DNR/DNI with a focus on comfort care/hospice. Palliative care evaluation has been ordered. Hospice consult has been ordered. BiPAP PRN for comfort. ROS: 14 point review of systems is negative except as specifically addressed above.     DIET CARDIAC; Carb Control: 3 carb choices (45 gms)/meal    Intake/Output Summary (Last 24 hours) at 3/27/2020 0926  Last data filed at 3/27/2020 0818  Gross per 24 hour   Intake 1178 ml   Output 2860 ml   Net -1682 ml     Medications:   dextrose      bumetanide 0.1 mg/mL infusion 0.5 mg/hr (03/26/20 1156)     Current Facility-Administered Medications   Medication Dose Route Frequency Provider Last Rate Last Dose    melatonin tablet 3 mg  3 mg Oral Nightly PRN Evi Staples MD   3 mg at 03/27/20 0030    methylPREDNISolone sodium (SOLU-MEDROL) injection 40 mg  40 mg Intravenous Q8H Anay Sousa MD   40 mg at 03/27/20 0634    ceFEPIme (MAXIPIME) 2 g in sodium chloride (PF) 20 mL IV syringe  2 g Intravenous Erica Hart MD   2 g at 03/27/20 0855    labetalol (VANCOCIN) 1,500 mg in dextrose 5 % 500 mL IVPB  1,500 mg Intravenous Q12H Ambrocio Núñez MD   Stopped at 03/27/20 3216    vancomycin (VANCOCIN) intermittent dosing (placeholder)   Other RX Placeholder Ambrocio Núñez MD        bumetanide (BUMEX) 12.5 mg in sodium chloride 0.9 % 125 mL infusion  0.5 mg/hr Intravenous Continuous Ambrocio Núñez MD 5 mL/hr at 03/26/20 1156 0.5 mg/hr at 03/26/20 1156    hydrALAZINE (APRESOLINE) injection 10 mg  10 mg Intravenous Q6H PRN Ambrocio Núñez MD   10 mg at 03/26/20 0740        Labs:     Recent Labs     03/25/20  1150   WBC 13.0*   RBC 3.80*   HGB 10.8*   HCT 38.0   .0*   MCH 28.4   MCHC 28.4*        Recent Labs     03/25/20  1150 03/25/20  1556 03/26/20  0154 03/27/20  0155     --  146* 141   K 5.1* 5.1 5.2* 4.8   ANIONGAP 7  --  18 12     --  98 96*   CO2 34*  --  30* 33*   BUN 36*  --  37* 40*   CREATININE 1.2*  --  1.0* 1.0*   GLUCOSE 110*  --  180* 234*   CALCIUM 9.9  --  10.0 9.7     Recent Labs     03/26/20  0154   MG 2.2   PHOS 3.2     Recent Labs     03/25/20  1150   AST 8   ALT <5*   BILITOT <0.2   ALKPHOS 98     ABGs:No results for input(s): PH, PO2, PCO2, HCO3, BE, O2SAT in the last 72 hours.   Troponin T:   Recent Labs     03/26/20  1346 03/26/20  1959 03/27/20  0155   TROPONINI 0.01 0.01 <0.01     INR:   Recent Labs     03/25/20  1150   INR 1.04     Lactic Acid:   Recent Labs     03/25/20  1445   LACTA 0.5       Objective:   Vitals: BP (!) 147/71   Pulse 102   Temp 97.1 °F (36.2 °C) (Temporal)   Resp 18   Ht 5' 5\" (1.651 m)   Wt (!) 566 lb 3 oz (256.8 kg)   SpO2 90%   BMI 94.22 kg/m²   24HR INTAKE/OUTPUT:      Intake/Output Summary (Last 24 hours) at 3/27/2020 1142  Last data filed at 3/27/2020 0818  Gross per 24 hour   Intake 1178 ml   Output 2860 ml   Net -1682 ml     General appearance: AAOx3, morbidly obese  HEENT: AT/NC  Lungs: Bilateral air entry, poor inspiratory effort, mild bilateral wheezing, no crackles or rhonchi apnea and obesity hypoventilation syndrome. Patient has baseline oxygen requirements of 3 to 4 L nasal cannula. Patient is AAO x3 during my evaluation today and during my discussions patient wishes to be DNR/DNI and is wishing for comfort care/hospice. Palliative care and hospice consults have been placed. Advance Directive: DNR-CC    DVT prophylaxis: Heparin    Discharge planning: To be determined - likely hospice placement.       Signed:  Marti Blair MD 3/27/2020 9:26 AM  Rounding Hospitalist

## 2020-03-27 NOTE — PROGRESS NOTES
Physical Therapy      DATE: 3/27/2020    Received order for physical therapy evaluation. The evaluation was attempted but was unable to be completed due to:     [x] The patient currently has an order for bed rest.     [] The patient declined.     [] The patient was unavailable. [] Nursing declined.     [] Waiting on clarification orders from Ortho / Neuro     [] The patient is currently restrained. Due to facility policy on restraints physical therapy is deferred when a patient is restrained.         Electronically signed by Gina Humphreys PT on 3/27/2020 at 6:28 AM

## 2020-03-27 NOTE — PLAN OF CARE
Problem: Falls - Risk of:  Goal: Will remain free from falls  Description: Will remain free from falls  Outcome: Ongoing  Goal: Absence of physical injury  Description: Absence of physical injury  Outcome: Ongoing     Problem: OXYGENATION/RESPIRATORY FUNCTION  Goal: Patient will maintain patent airway  Outcome: Ongoing  Goal: Patient will achieve/maintain normal respiratory rate/effort  Description: Respiratory rate and effort will be within normal limits for the patient  Outcome: Ongoing     Problem: SKIN INTEGRITY  Goal: Skin integrity is maintained or improved  Outcome: Ongoing

## 2020-03-30 ENCOUNTER — TELEPHONE (OUTPATIENT)
Dept: INTERNAL MEDICINE CLINIC | Age: 70
End: 2020-03-30

## 2020-03-30 LAB
BLOOD CULTURE, ROUTINE: NORMAL
CULTURE, BLOOD 2: NORMAL

## 2020-03-30 NOTE — TELEPHONE ENCOUNTER
Increasing Lasix higher than what she is got is probably not going to do much good.     Today on the duo nebs and okay to change the Ativan

## 2020-04-28 PROBLEM — Z51.5 HOSPICE CARE PATIENT: Status: ACTIVE | Noted: 2020-03-27
